# Patient Record
Sex: FEMALE | Race: WHITE | Employment: PART TIME | URBAN - METROPOLITAN AREA
[De-identification: names, ages, dates, MRNs, and addresses within clinical notes are randomized per-mention and may not be internally consistent; named-entity substitution may affect disease eponyms.]

---

## 2017-01-03 DIAGNOSIS — F41.9 ANXIETY: Primary | ICD-10-CM

## 2017-01-03 DIAGNOSIS — G47.00 INSOMNIA, UNSPECIFIED: ICD-10-CM

## 2017-01-03 NOTE — TELEPHONE ENCOUNTER
Last Written Prescription Date: zoloft=6/8/16, amitrip=5/25/16  Last Fill Quantity: zoloft=135, amitrip=90 # refills: 1, amitrip=0  Last Office Visit with [facility] primary care provider:  6/8/16        Last PHQ-9 score on record=   PHQ-9 SCORE 11/11/2016   Total Score -   Total Score MyChart -   Total Score 0     Leonora Pavon, Pharmacy [facility]

## 2017-01-04 RX ORDER — SERTRALINE HYDROCHLORIDE 100 MG/1
150 TABLET, FILM COATED ORAL DAILY
Qty: 135 TABLET | Refills: 0 | Status: SHIPPED | OUTPATIENT
Start: 2017-01-04 | End: 2017-12-05

## 2017-01-04 NOTE — TELEPHONE ENCOUNTER
Prescription approved per [facility] Refill Protocol.  Mikayla Ratliff PharmD   [facility]  [email or web address].

## 2017-03-09 DIAGNOSIS — G47.00 INSOMNIA, UNSPECIFIED: ICD-10-CM

## 2017-03-09 NOTE — TELEPHONE ENCOUNTER
We called pt to schedule follow up. 3/21/17.  Prescription approved per [facility] Refill Protocol.  Override OK.  Rafita Barnes RN

## 2017-03-09 NOTE — TELEPHONE ENCOUNTER
Amitriptyline 25 mg tab       Last Written Prescription Date: 01/04/17  Last Fill Quantity: 90, # refills: 0  Last Office Visit with [facility], [facility] or [facility] prescribing provider: 06/08/16 Roxana Cuadra       BP Readings from Last 3 Encounters:   10/22/16 142/80   06/08/16 125/80   05/31/16 127/80

## 2017-03-21 ENCOUNTER — OFFICE VISIT (OUTPATIENT)
Dept: FAMILY MEDICINE | Facility: CLINIC | Age: 57
End: 2017-03-21
Payer: COMMERCIAL

## 2017-03-21 VITALS
BODY MASS INDEX: 33.38 KG/M2 | DIASTOLIC BLOOD PRESSURE: 81 MMHG | TEMPERATURE: 99.4 F | RESPIRATION RATE: 16 BRPM | HEIGHT: 62 IN | HEART RATE: 97 BPM | SYSTOLIC BLOOD PRESSURE: 135 MMHG | OXYGEN SATURATION: 95 % | WEIGHT: 181.4 LBS

## 2017-03-21 DIAGNOSIS — F41.8 SITUATIONAL ANXIETY: Primary | ICD-10-CM

## 2017-03-21 DIAGNOSIS — N32.81 OVERACTIVE BLADDER: ICD-10-CM

## 2017-03-21 DIAGNOSIS — G47.00 INSOMNIA, UNSPECIFIED: ICD-10-CM

## 2017-03-21 DIAGNOSIS — E78.5 HYPERLIPIDEMIA LDL GOAL <160: ICD-10-CM

## 2017-03-21 PROCEDURE — 99214 OFFICE O/P EST MOD 30 MIN: CPT | Performed by: PHYSICIAN ASSISTANT

## 2017-03-21 RX ORDER — SOLIFENACIN SUCCINATE 5 MG/1
5 TABLET, FILM COATED ORAL DAILY
Qty: 90 TABLET | Refills: 1 | Status: SHIPPED | OUTPATIENT
Start: 2017-03-21 | End: 2017-12-05

## 2017-03-21 RX ORDER — HYDROXYZINE HYDROCHLORIDE 25 MG/1
25-50 TABLET, FILM COATED ORAL EVERY 6 HOURS PRN
Qty: 90 TABLET | Refills: 1 | Status: SHIPPED | OUTPATIENT
Start: 2017-03-21 | End: 2017-12-05

## 2017-03-21 RX ORDER — SERTRALINE HYDROCHLORIDE 100 MG/1
200 TABLET, FILM COATED ORAL DAILY
Qty: 180 TABLET | Refills: 1 | Status: SHIPPED | OUTPATIENT
Start: 2017-03-21 | End: 2018-01-10 | Stop reason: DRUGHIGH

## 2017-03-21 ASSESSMENT — ANXIETY QUESTIONNAIRES
GAD7 TOTAL SCORE: 18
3. WORRYING TOO MUCH ABOUT DIFFERENT THINGS: NEARLY EVERY DAY
IF YOU CHECKED OFF ANY PROBLEMS ON THIS QUESTIONNAIRE, HOW DIFFICULT HAVE THESE PROBLEMS MADE IT FOR YOU TO DO YOUR WORK, TAKE CARE OF THINGS AT HOME, OR GET ALONG WITH OTHER PEOPLE: VERY DIFFICULT
1. FEELING NERVOUS, ANXIOUS, OR ON EDGE: MORE THAN HALF THE DAYS
2. NOT BEING ABLE TO STOP OR CONTROL WORRYING: NEARLY EVERY DAY
5. BEING SO RESTLESS THAT IT IS HARD TO SIT STILL: MORE THAN HALF THE DAYS
7. FEELING AFRAID AS IF SOMETHING AWFUL MIGHT HAPPEN: NEARLY EVERY DAY
6. BECOMING EASILY ANNOYED OR IRRITABLE: MORE THAN HALF THE DAYS

## 2017-03-21 ASSESSMENT — PATIENT HEALTH QUESTIONNAIRE - PHQ9: 5. POOR APPETITE OR OVEREATING: NEARLY EVERY DAY

## 2017-03-21 NOTE — MR AVS SNAPSHOT
After Visit Summary   3/21/2017    Delilah Miranda    MRN: [id number]           Patient Information     Date Of Birth          1960        Visit Information        Provider Department      3/21/2017 7:30 AM Roxana Cuadra PA-C [facility]        Today's Diagnoses     Situational anxiety        Insomnia, unspecified        Overactive bladder          Care Instructions    [facility] for certification MM    Pap Smear due this year.         Follow-ups after your visit        Who to contact     If you have questions or need follow up information about today's clinic visit or your schedule please contact [facility] directly at [phone].  Normal or non-critical lab and imaging results will be communicated to you by MyChart, letter or phone within 4 business days after the clinic has received the results. If you do not hear from us within 7 days, please contact the clinic through appweevrhart or phone. If you have a critical or abnormal lab result, we will notify you by phone as soon as possible.  Submit refill requests through [employer] or call your pharmacy and they will forward the refill request to us. Please allow 3 business days for your refill to be completed.          Additional Information About Your Visit        MyChart Information     [employer] gives you secure access to your electronic health record. If you see a primary care provider, you can also send messages to your care team and make appointments. If you have questions, please call your primary care clinic.  If you do not have a primary care provider, please call [phone] and they will assist you.        Care EveryWhere ID     This is your Care EveryWhere ID. This could be used by other organizations to access your [facility] medical records  [id number]        Your Vitals Were     Pulse Temperature Respirations Height Last Period Pulse Oximetry    97 99.4  F (37.4  C) (Oral) 16 5'  "2\" (1.575 m) 04/25/2007 95%    Breastfeeding? BMI (Body Mass Index)                No 33.18 kg/m2           Blood Pressure from Last 3 Encounters:   03/21/17 135/81   10/22/16 142/80   06/08/16 125/80    Weight from Last 3 Encounters:   03/21/17 181 lb 6.4 oz (82.3 kg)   10/22/16 180 lb (81.6 kg)   06/08/16 181 lb (82.1 kg)              Today, you had the following     No orders found for display         Today's Medication Changes          These changes are accurate as of: 3/21/17  7:54 AM.  If you have any questions, ask your nurse or doctor.               These medicines have changed or have updated prescriptions.        Dose/Directions    amitriptyline 25 MG tablet   Commonly known as:  ELAVIL   This may have changed:  See the new instructions.   Used for:  Insomnia, unspecified   Changed by:  Roxana Cuadra PA-C        TAKE ONE TABLET BY MOUTH AT BEDTIME   Quantity:  90 tablet   Refills:  0       * sertraline 100 MG tablet   Commonly known as:  ZOLOFT   This may have changed:  Another medication with the same name was added. Make sure you understand how and when to take each.   Used for:  Anxiety   Changed by:  Roxana Cuadra PA-C        Dose:  150 mg   Take 1.5 tablets (150 mg) by mouth daily   Quantity:  135 tablet   Refills:  0       * sertraline 100 MG tablet   Commonly known as:  ZOLOFT   This may have changed:  You were already taking a medication with the same name, and this prescription was added. Make sure you understand how and when to take each.   Used for:  Situational anxiety   Changed by:  Roxana Cuadra PA-C        Dose:  200 mg   Take 2 tablets (200 mg) by mouth daily   Quantity:  180 tablet   Refills:  1       * Notice:  This list has 2 medication(s) that are the same as other medications prescribed for you. Read the directions carefully, and ask your doctor or other care provider to review them with you.      Stop taking these medicines if you haven't already. " Please contact your care team if you have questions.     azithromycin 250 MG tablet   Commonly known as:  ZITHROMAX   Stopped by:  Roxana Cuadra PA-C           co-enzyme Q-10 100 MG Caps capsule   Stopped by:  Roxana Cuadra PA-C           pravastatin 40 MG tablet   Commonly known as:  PRAVACHOL   Stopped by:  Roxana Cuadra PA-C           triamcinolone 0.1 % cream   Commonly known as:  KENALOG   Stopped by:  Roxana Cuadra PA-C                Where to get your medicines      These medications were sent to [address] [address]     Phone:  [phone]     amitriptyline 25 MG tablet    hydrOXYzine 25 MG tablet    sertraline 100 MG tablet    solifenacin 5 MG tablet                Primary Care Provider Office Phone # Fax #    Roxana Cuadra PA-C [phone]       [address] [address]        Thank you!     Thank you for choosing [facility]  for your care. Our goal is always to provide you with excellent care. Hearing back from our patients is one way we can continue to improve our services. Please take a few minutes to complete the written survey that you may receive in the mail after your visit with us. Thank you!             Your Updated Medication List - Protect others around you: Learn how to safely use, store and throw away your medicines at www.disposemymeds.org.          This list is accurate as of: 3/21/17  7:54 AM.  Always use your most recent med list.                   Brand Name Dispense Instructions for use    albuterol 108 (90 BASE) MCG/ACT Inhaler    albuterol    2 Inhaler    Inhale 2 puffs into the lungs every 6 hours as needed INHALE 1 TO 2 PUFFS EVERY 4 TO 6 HOURS AS NEEDED       amitriptyline 25 MG tablet    ELAVIL    90 tablet    TAKE ONE TABLET BY MOUTH AT BEDTIME       aspirin 81 MG tablet       Take 1 tablet by mouth daily.       atorvastatin 40 MG tablet    LIPITOR    90 tablet    Take 1 tablet (40 mg) by mouth daily       fluticasone 50 MCG/ACT spray    FLONASE    48 g    Spray 1-2 sprays into both nostrils daily       HM VITAMIN D3 4000 UNITS Caps   Generic drug:  Cholecalciferol      Take  by mouth.       hydrOXYzine 25 MG tablet    ATARAX    90 tablet    Take 1-2 tablets (25-50 mg) by mouth every 6 hours as needed for anxiety       methocarbamol 750 MG tablet    ROBAXIN    90 tablet    Take 1 tablet (750 mg) by mouth every 4 hours       ondansetron 8 MG tablet    ZOFRAN    18 tablet    Take 1 tablet (8 mg) by mouth every 8 hours as needed for nausea       * sertraline 100 MG tablet    ZOLOFT    135 tablet    Take 1.5 tablets (150 mg) by mouth daily       * sertraline 100 MG tablet    ZOLOFT    180 tablet    Take 2 tablets (200 mg) by mouth daily       solifenacin 5 MG tablet    VESICARE    90 tablet    Take 1 tablet (5 mg) by mouth daily       * Notice:  This list has 2 medication(s) that are the same as other medications prescribed for you. Read the directions carefully, and ask your doctor or other care provider to review them with you.

## 2017-03-21 NOTE — PROGRESS NOTES
SUBJECTIVE:                                                    Delilah Miranda is a 56 year old female who presents to clinic today for the following health issues:      Hyperlipidemia Follow-Up      Rate your low fat/cholesterol diet?: not monitoring fat    Taking statin?  Yes, possible muscle aches from statin    Other lipid medications/supplements?:  none     Anxiety Follow-Up    Status since last visit: Worsened     Other associated symptoms:None    Complicating factors:   Significant life event: Yes-     Current substance abuse: Cannabis, using for PTSD, looking into medical certification for this  Depression symptoms: Yes-    ELENITA-7 SCORE 5/31/2016 6/8/2016 11/11/2016   Total Score - - -   Total Score 21 14 2        GAD7           Amount of exercise or physical activity: 6-7 days/week for an average of 15-30 minutes    Problems taking medications regularly: No    Medication side effects: none    Diet: regular (no restrictions)      Medication Followup of Vesicare    Taking Medication as prescribed: yes    Side Effects:  None    Medication Helping Symptoms:  yes       Problem list and histories reviewed & adjusted, as indicated.  Additional history: as documented    Patient Active Problem List   Diagnosis     Allergic rhinitis     Thoracic or lumbosacral neuritis or radiculitis, unspecified     Mild intermittent asthma     Mild major depression (H)     OA (osteoarthritis) of knee     Hyperlipidemia LDL goal <160     Elevated fasting glucose     Vitamin D deficiency     Heartburn     Snoring     Urinary incontinence, nocturnal enuresis     Anxiety     Overactive bladder     Situational anxiety     Past Surgical History   Procedure Laterality Date     C nonspecific procedure       meniscus       Social History   Substance Use Topics     Smoking status: Former Smoker     Smokeless tobacco: Never Used      Comment: smoked 1 pk qd in her twenties quit at age 26     Alcohol use 0.0 oz/week     0 Standard drinks or  "equivalent per week      Comment: social-rarely     Family History   Problem Relation Age of Onset     CANCER Maternal Grandmother      CANCER Paternal Grandmother      CANCER Paternal Grandfather      HEART DISEASE Father      Hypertension Father      DIABETES Father      DIABETES Sister      DIABETES Maternal Grandfather            Reviewed and updated as needed this visit by clinical staff       Reviewed and updated as needed this visit by Provider         ROS:  Constitutional, HEENT, cardiovascular, pulmonary, GI, , musculoskeletal, neuro, skin, endocrine and psych systems are negative, except as otherwise noted.    OBJECTIVE:                                                    /81 (BP Location: Right arm, Patient Position: Chair, Cuff Size: Adult Large)  Pulse 97  Temp 99.4  F (37.4  C) (Oral)  Resp 16  Ht 5' 2\" (1.575 m)  Wt 181 lb 6.4 oz (82.3 kg)  LMP 04/25/2007  SpO2 95%  Breastfeeding? No  BMI 33.18 kg/m2  Body mass index is 33.18 kg/(m^2).  GENERAL APPEARANCE: healthy, alert and no distress  RESP: lungs clear to auscultation - no rales, rhonchi or wheezes  CV: regular rates and rhythm, normal S1 S2, no S3 or S4 and no murmur, click or rub  SKIN: no suspicious lesions or rashes  PSYCH: mentation appears normal and anxious         ASSESSMENT/PLAN:                                                            1. Situational anxiety  Will increase to 200 mg  Restart your EMDR therapy  Recheck in 3-6 months.   - sertraline (ZOLOFT) 100 MG tablet; Take 2 tablets (200 mg) by mouth daily  Dispense: 180 tablet; Refill: 1  - hydrOXYzine (ATARAX) 25 MG tablet; Take 1-2 tablets (25-50 mg) by mouth every 6 hours as needed for anxiety  Dispense: 90 tablet; Refill: 1    2. Insomnia, unspecified  Stable.   - amitriptyline (ELAVIL) 25 MG tablet; TAKE ONE TABLET BY MOUTH AT BEDTIME  Dispense: 90 tablet; Refill: 0    3. Overactive bladder  Stable.   - solifenacin (VESICARE) 5 MG tablet; Take 1 tablet (5 mg) by mouth " daily  Dispense: 90 tablet; Refill: 1    4. Hyperlipidemia LDL goal <160  Using meds currently no SE          Roxana Cuadra PA-C, PAMaria IsabelC  [facility]

## 2017-03-21 NOTE — NURSING NOTE
"Chief Complaint   Patient presents with     Recheck Medication       Initial /81 (BP Location: Right arm, Patient Position: Chair, Cuff Size: Adult Large)  Pulse 97  Temp 99.4  F (37.4  C) (Oral)  Resp 16  Ht 5' 2\" (1.575 m)  Wt 181 lb 6.4 oz (82.3 kg)  LMP 04/25/2007  SpO2 95%  Breastfeeding? No  BMI 33.18 kg/m2 Estimated body mass index is 33.18 kg/(m^2) as calculated from the following:    Height as of this encounter: 5' 2\" (1.575 m).    Weight as of this encounter: 181 lb 6.4 oz (82.3 kg).  Medication Reconciliation: complete Bettie Schmid MA  Health Maintenance has been reviewed.       "

## 2017-03-22 ASSESSMENT — PATIENT HEALTH QUESTIONNAIRE - PHQ9: SUM OF ALL RESPONSES TO PHQ QUESTIONS 1-9: 15

## 2017-03-22 ASSESSMENT — ANXIETY QUESTIONNAIRES: GAD7 TOTAL SCORE: 18

## 2017-05-16 ENCOUNTER — TELEPHONE (OUTPATIENT)
Dept: FAMILY MEDICINE | Facility: CLINIC | Age: 57
End: 2017-05-16

## 2017-05-16 NOTE — TELEPHONE ENCOUNTER
Panel Management Review      Patient has the following on her problem list:     Asthma review     ACT Total Scores 11/11/2016   ACT TOTAL SCORE (Goal Greater than or Equal to 20) 21   In the past 12 months, how many times did you visit the emergency room for your asthma without being admitted to the hospital? 0   In the past 12 months, how many times were you hospitalized overnight because of your asthma? 0      1. Is Asthma diagnosis on the Problem List? Yes    2. Is Asthma listed on Health Maintenance? Yes    3. Patient is due for:  ACT      Composite cancer screening  Chart review shows that this patient is due/due soon for the following Pap Smear  Summary:    Patient is due/failing the following:   ACT, PAP and PHYSICAL    Action needed:   Patient needs office visit for physical with pap. and Patient needs to do ACT.    Type of outreach:    Phone, left message for patient to call back.     Questions for provider review:    None                                                                                                                                    Bettie Schmid MA      Chart routed to Care Team .

## 2017-05-16 NOTE — LETTER
[facility]  [address] [id number] [phone]  May 23, 2017    Delilah Miranda  [address] [address] [address]    Dear Delilah,    I care about your health and have reviewed your health plan. I have reviewed your medical conditions, medication list, and lab results and am making recommendations based on this review, to better manage your health.    You are in particular need of attention regarding:  -Asthma  -Cervical Cancer Screening  -Wellness (Physical) Visit     I am recommending that you:  {recommendations:-schedule a WELLNESS (Physical) APPOINTMENT with me.   I will check fasting labs the same day - nothing to eat except water and meds for 8-10 hours prior.  -schedule a PAP SMEAR EXAM which is due.  Please disregard this reminder if you have had this exam elsewhere within the last year.  It would be helpful for us to have a copy of your recent pap smear report in our file so that we can best coordinate your care.   -Complete and return the attached ASTHMA CONTROL TEST.  If your total score is 19 or less or you have been to the ER or urgent care for your asthma, then please schedule an asthma followup appointment.    Here is a list of Health Maintenance topics that are due now or due soon:  Health Maintenance Due   Topic Date Due     HEPATITIS C SCREENING  07/11/1976     TETANUS IMMUNIZATION (SYSTEM ASSIGNED)  05/07/2017     PAP Q3 YR  04/16/2017     ASTHMA CONTROL TEST Q6 MOS (NO INBASKET)  05/10/2017     ASTHMA ACTION PLAN Q1 YR (NO INBASKET)  06/08/2017     DEPRESSION ACTION PLAN Q1 YR (NO INBASKET)  06/08/2017       Please call us at [phone] (or use [email or web address]) to address the above recommendations.     Thank you for trusting [facility] and we appreciate the opportunity to serve you.  We look forward to supporting your healthcare needs in the future.    Healthy Regards,    Roxana Cuadra PA-C

## 2017-06-24 ENCOUNTER — HEALTH MAINTENANCE LETTER (OUTPATIENT)
Age: 57
End: 2017-06-24

## 2017-09-22 ENCOUNTER — TRANSFERRED RECORDS (OUTPATIENT)
Dept: HEALTH INFORMATION MANAGEMENT | Facility: CLINIC | Age: 57
End: 2017-09-22

## 2017-09-28 ENCOUNTER — THERAPY VISIT (OUTPATIENT)
Dept: PHYSICAL THERAPY | Facility: CLINIC | Age: 57
End: 2017-09-28
Payer: COMMERCIAL

## 2017-09-28 DIAGNOSIS — M54.41 CHRONIC BILATERAL LOW BACK PAIN WITH RIGHT-SIDED SCIATICA: Primary | ICD-10-CM

## 2017-09-28 DIAGNOSIS — G89.29 CHRONIC BILATERAL LOW BACK PAIN WITH RIGHT-SIDED SCIATICA: Primary | ICD-10-CM

## 2017-09-28 DIAGNOSIS — M53.3 SI (SACROILIAC) JOINT DYSFUNCTION: ICD-10-CM

## 2017-09-28 PROCEDURE — 97530 THERAPEUTIC ACTIVITIES: CPT | Mod: GP | Performed by: PHYSICAL THERAPIST

## 2017-09-28 PROCEDURE — 97140 MANUAL THERAPY 1/> REGIONS: CPT | Mod: GP | Performed by: PHYSICAL THERAPIST

## 2017-09-28 PROCEDURE — 97161 PT EVAL LOW COMPLEX 20 MIN: CPT | Mod: GP | Performed by: PHYSICAL THERAPIST

## 2017-09-28 NOTE — LETTER
[facility] PHYSICAL THERAPY  [address]  [facility] [address]  [phone]    2017    Re: Delilah Miranda   :   1960  MRN:  [id number]   REFERRING PHYSICIAN:   Geraldo Fitzpatrick    [facility] PHYSICAL THERAPY  Date of Initial Evaluation:  17  Visits:  Rxs Used: 1  Reason for Referral:  Chronic bilateral low back pain with right-sided sciatica  SI (sacroiliac) joint dysfunction    EVALUATION SUMMARY    [facility] Initial Evaluation    Subjective:    Patient is a 57 year old female presenting with rehab back hpi. The history is provided by the patient.   Delilah Miranda is a 57 year old female with a lumbar condition.  Condition occurred with:  Insidious onset.  Condition occurred: for unknown reasons.  This is a recurrent condition  Patient reports a long history of recurrent LBP and had PT and an epidural 16 years ago. She started having some issues in 2017 for no apparent reason with an exacerbation of pain in early September. Pain recently has spread from the lower back into the right greater than left hip. On 17 she had a cortisone injection into her right hip with 50% pain relief. Chief complaints are of intermittent bilateral low back and bilateral hip pain with radiation into the right groin and anterior thigh. Pain is now worse with walking than sitting, which is the opposite of her previous symptoms. She also reports some weakness of the right leg and feeling of giving way, although it hasn't.  Patient reports pain:  Lumbar spine left and lumbar spine right.  Radiates to:  Gluteals right and thigh right (bilateral hips).  Pain is described as shooting and aching and is intermittent and reported as 6/10.  Associated symptoms:  Loss of motion/stiffness and loss of strength. Pain is worse during the day.  Symptoms are exacerbated by lifting, sitting, walking and  standing and relieved by rest, ice, analgesics and other.  Since onset symptoms are rapidly improving.  Special tests:  MRI (1 year ago, mild lumbar stenosis, right anterior pelvic tilt).  Previous treatment includes other (right hip cortisone injection).    General health as reported by patient is good.  Pertinent medical history includes:  Osteoarthritis, overweight, depression, asthma and sleep disorder/apnea.  Medical allergies: no.  Other surgeries include:  No.  Current medications:  Muscle relaxants and anti-depressants.  Current occupation is RN.  Patient is working in normal job without restrictions.  Primary job tasks include:  Prolonged standing, lifting and prolonged sitting.           Objective:    Standing Alignment:    Lumbar:  Lordosis decr  Gait:    Gait Type:  Antalgic   Assistive Devices:  None  Deviations:  Hip:  Trendelenberg L and Trendelenberg RGeneral Deviations:  Stride length decr and frank decr  Re: Delilah Miranda   :   1960      Lumbar/SI Evaluation  ROM:    AROM Lumbar:   Flexion:          100% no change in pain  Ext:                    40% LBP and stiffness   Side Bend:        Left:     Right:   Rotation:           Left:     Right:   Side Glide:        Left:  80% with right hip pain    Right:  80% with left hip pain  Strength: fair lower abdominal control, bilateral glut medius grade 4-/5, grade 4/5   Lumbar Myotomes:  normal  Lumbar DTR's:  not assessed  Cord Signs:  normal  Lumbar Dermtomes:  normal  Neural Tension/Mobility:  Lumbar:  Normal    Lumbar Palpation:    Tenderness present at Left:    PSIS and Greater Trochanter  Tenderness present at Right: PSIS and Greater Trochanter  SI joint/Sacrum:    R anterior/L posterior innominate rotation without pubic symphsis involvement. 90% correction achieved with MET. Post MET glut medius and max strength increased to grade 5-/5 bilaterally and trunk extension increased to 70%    Assessment/Plan:      Patient is a 57 year old  female with lumbar complaints.    Patient has the following significant findings with corresponding treatment plan.                Diagnosis 1: LB and hip pain with mild lumbar stenosis and R anterior pelvic tilt    Pain -  manual therapy and education  Decreased ROM/flexibility - manual therapy and therapeutic exercise  Decreased strength - therapeutic exercise and therapeutic activities  Impaired gait - gait training  Impaired muscle performance - neuro re-education  Decreased function - therapeutic activities  Impaired posture - neuro re-education  Pelvic rotation    Therapy Evaluation Codes:   1) History comprised of:   Personal factors that impact the plan of care:      None.    Comorbidity factors that impact the plan of care are:      Asthma, Depression, Osteoarthritis, Overweight, Pain at night/rest and Sleep disorder/apnea.     Medications impacting care: Anti-depressant and Pain.  2) Examination of Body Systems comprised of:   Body structures and functions that impact the plan of care:      Lumbar spine and Sacral illiac joint.   Activity limitations that impact the plan of care are:      Driving, Lifting, Sitting, Standing, Walking and Sleeping.  3) Clinical presentation characteristics are:   Stable/Uncomplicated.  4) Decision-Making    Low complexity using standardized patient assessment instrument and/or measureable assessment of functional outcome.  Cumulative Therapy Evaluation is: Low complexity.  Re: Delilah Miranda   :   1960          Previous and current functional limitations:  (See Goal Flow Sheet for this information)    Short term and Long term goals: (See Goal Flow Sheet for this information)   Communication ability:  Patient appears to be able to clearly communicate and understand verbal and written communication and follow directions correctly.  Treatment Explanation - The following has been discussed with the patient:   RX ordered/plan of care  Anticipated outcomes  Possible risks  and side effects  This patient would benefit from PT intervention to resume normal activities.   Rehab potential is good.    Frequency:  1-2 X week, once daily  Duration:  for 4-6 weeks  Discharge Plan:  Achieve all LTG.  Independent in home treatment program.  Reach maximal therapeutic benefit.    Thank you for your referral.    INQUIRIES  Therapist: Ileana Dimas, PT  [facility]  [id number] [place] [address]  Phone: [phone]  Fax: [phone]

## 2017-09-28 NOTE — PROGRESS NOTES
[facility] Initial Evaluation    Subjective:    Patient is a 57 year old female presenting with rehab back hpi. The history is provided by the patient.   Delilah Miranda is a 57 year old female with a lumbar condition.  Condition occurred with:  Insidious onset.  Condition occurred: for unknown reasons.  This is a recurrent condition  Patient reports a long history of recurrent LBP and had PT and an epidural 16 years ago. She started having some issues in March 2017 for no apparent reason with an exacerbation of pain in early September. Pain recently has spread from the lower back into the right greater than left hip. On 9/22/17 she had a cortisone injection into her right hip with 50% pain relief. Chief complaints are of intermittent bilateral low back and bilateral hip pain with radiation into the right groin and anterior thigh. Pain is now worse with walking than sitting, which is the opposite of her previous symptoms. She also reports some weakness of the right leg and feeling of giving way, although it hasn't. .    Patient reports pain:  Lumbar spine left and lumbar spine right.  Radiates to:  Gluteals right and thigh right (bilateral hips).  Pain is described as shooting and aching and is intermittent and reported as 6/10.  Associated symptoms:  Loss of motion/stiffness and loss of strength. Pain is worse during the day.  Symptoms are exacerbated by lifting, sitting, walking and standing and relieved by rest, ice, analgesics and other.  Since onset symptoms are rapidly improving.  Special tests:  MRI (1 year ago, mild lumbar stenosis, right anterior pelvic tilt).  Previous treatment includes other (right hip cortisone injection).    General health as reported by patient is good.  Pertinent medical history includes:  Osteoarthritis, overweight, depression, asthma and sleep disorder/apnea.  Medical allergies: no.  Other surgeries include:  No.  Current medications:  Muscle relaxants and  anti-depressants.  Current occupation is RN.  Patient is working in normal job without restrictions.  Primary job tasks include:  Prolonged standing, lifting and prolonged sitting.                                Objective:    Standing Alignment:        Lumbar:  Lordosis decr            Gait:    Gait Type:  Antalgic   Assistive Devices:  None  Deviations:  Hip:  Trendelenberg L and Trendelenberg RGeneral Deviations:  Stride length decr and frank decr               Lumbar/SI Evaluation  ROM:    AROM Lumbar:   Flexion:          100% no change in pain  Ext:                    40% LBP and stiffness   Side Bend:        Left:     Right:   Rotation:           Left:     Right:   Side Glide:        Left:  80% with right hip pain    Right:  80% with left hip pain        Strength: fair lower abdominal control, bilateral glut medius grade 4-/5, grade 4/5   Lumbar Myotomes:  normal            Lumbar DTR's:  not assessed      Cord Signs:  normal    Lumbar Dermtomes:  normal                Neural Tension/Mobility:  Lumbar:  Normal        Lumbar Palpation:    Tenderness present at Left:    PSIS and Greater Trochanter  Tenderness present at Right: PSIS and Greater Trochanter        SI joint/Sacrum:    R anterior/L posterior innominate rotation without pubic symphsis involvement. 90% correction achieved with MET. Post MET glut medius and max strength increased to grade 5-/5 bilaterally and trunk extension increased to 70%                                                       General     ROS    Assessment/Plan:      Patient is a 57 year old female with lumbar complaints.    Patient has the following significant findings with corresponding treatment plan.                Diagnosis 1: LB and hip pain with mild lumbar stenosis and R anterior pelvic tilt    Pain -  manual therapy and education  Decreased ROM/flexibility - manual therapy and therapeutic exercise  Decreased strength - therapeutic exercise and therapeutic  activities  Impaired gait - gait training  Impaired muscle performance - neuro re-education  Decreased function - therapeutic activities  Impaired posture - neuro re-education  Pelvic rotation    Therapy Evaluation Codes:   1) History comprised of:   Personal factors that impact the plan of care:      None.    Comorbidity factors that impact the plan of care are:      Asthma, Depression, Osteoarthritis, Overweight, Pain at night/rest and Sleep disorder/apnea.     Medications impacting care: Anti-depressant and Pain.  2) Examination of Body Systems comprised of:   Body structures and functions that impact the plan of care:      Lumbar spine and Sacral illiac joint.   Activity limitations that impact the plan of care are:      Driving, Lifting, Sitting, Standing, Walking and Sleeping.  3) Clinical presentation characteristics are:   Stable/Uncomplicated.  4) Decision-Making    Low complexity using standardized patient assessment instrument and/or measureable assessment of functional outcome.  Cumulative Therapy Evaluation is: Low complexity.    Previous and current functional limitations:  (See Goal Flow Sheet for this information)    Short term and Long term goals: (See Goal Flow Sheet for this information)     Communication ability:  Patient appears to be able to clearly communicate and understand verbal and written communication and follow directions correctly.  Treatment Explanation - The following has been discussed with the patient:   RX ordered/plan of care  Anticipated outcomes  Possible risks and side effects  This patient would benefit from PT intervention to resume normal activities.   Rehab potential is good.    Frequency:  1-2 X week, once daily  Duration:  for 4-6 weeks  Discharge Plan:  Achieve all LTG.  Independent in home treatment program.  Reach maximal therapeutic benefit.    Please refer to the daily flowsheet for treatment today, total treatment time and time spent performing 1:1 timed codes.

## 2017-09-28 NOTE — MR AVS SNAPSHOT
After Visit Summary   9/28/2017    Delilah Miranda    MRN: [id number]           Patient Information     Date Of Birth          1960        Visit Information        Provider Department      9/28/2017 8:10 AM Ileana Dimas PT [facility] Physical Therapy        Today's Diagnoses     Chronic bilateral low back pain with right-sided sciatica    -  1    SI (sacroiliac) joint dysfunction           Follow-ups after your visit        Your next 10 appointments already scheduled     Oct 04, 2017  9:30 AM CDT   LUZ Spine with Ileana Dimas PT   [facility] Therapy ([facility])    [address] [place] [address]  [facility] [address] [phone]              Who to contact     If you have questions or need follow up information about today's clinic visit or your schedule please contact [facility] PHYSICAL THERAPY directly at [phone].  Normal or non-critical lab and imaging results will be communicated to you by Guided Interventionshart, letter or phone within 4 business days after the clinic has received the results. If you do not hear from us within 7 days, please contact the clinic through [employer] Softwaret or phone. If you have a critical or abnormal lab result, we will notify you by phone as soon as possible.  Submit refill requests through [employer]pie or call your pharmacy and they will forward the refill request to us. Please allow 3 business days for your refill to be completed.          Additional Information About Your Visit        MyChart Information     [employer] gives you secure access to your electronic health record. If you see a primary care provider, you can also send messages to your care team and make appointments. If you have questions, please call your primary care clinic.  If you do not have a primary care provider, please call [phone] and they will assist you.        Care EveryWhere ID      This is your Care EveryWhere ID. This could be used by other organizations to access your [place] medical records  [id number]        Your Vitals Were     Last Period                   04/25/2007            Blood Pressure from Last 3 Encounters:   03/21/17 135/81   10/22/16 142/80   06/08/16 125/80    Weight from Last 3 Encounters:   03/21/17 82.3 kg (181 lb 6.4 oz)   10/22/16 81.6 kg (180 lb)   06/08/16 82.1 kg (181 lb)              We Performed the Following     HC PT EVAL, LOW COMPLEXITY     LUZ INITIAL EVAL REPORT     MANUAL THER TECH,1+REGIONS,EA 15 MIN     THERAPEUTIC ACTIVITIES        Primary Care Provider Office Phone # Fax #    Roxana Rhonda Cuadra PA-C [phone] [address] [facility] [address]        Equal Access to Services     JACKIE HANSEN : Hadii aad ku hadasho Soomaali, waaxda luqadaha, qaybta kaalmada adeegyada, mikala main haypatricia weber . So [facility] [phone].    ATENCIÓN: Si habla español, tiene a rodriguez disposición servicios gratuitos de asistencia lingüística. Llame al [phone].    We comply with applicable federal civil rights laws and [place] laws. We do not discriminate on the basis of race, color, national origin, age, disability sex, sexual orientation or gender identity.            Thank you!     Thank you for choosing [facility] PH[facility]ICAL THERAP[facility]  for your care. Our goal is always to provide you with excellent care. Hearing back from our patients is one way we can continue to improve our services. Please take a few minutes to complete the written survey that you may receive in the mail after your visit with us. Thank you!             Your Updated Medication List - Protect others around you: Learn how to safely use, store and throw away your medicines at www.disposemymeds.org.          This list is accurate as of: 9/28/17  5:32 PM.  Always use your most recent med list.                   Brand Name Dispense  Instructions for use Diagnosis    albuterol 108 (90 BASE) MCG/ACT Inhaler    PROAIR HFA    2 Inhaler    Inhale 2 puffs into the lungs every 6 hours as needed INHALE 1 TO 2 PUFFS EVERY 4 TO 6 HOURS AS NEEDED    Mild intermittent asthma       amitriptyline 25 MG tablet    ELAVIL    90 tablet    TAKE ONE TABLET BY MOUTH AT BEDTIME    Insomnia, unspecified       aspirin 81 MG tablet      Take 1 tablet by mouth daily.        atorvastatin 40 MG tablet    LIPITOR    90 tablet    Take 1 tablet (40 mg) by mouth daily    Hyperlipidemia LDL goal <130       fluticasone 50 MCG/ACT spray    FLONASE    48 g    Spray 1-2 sprays into both nostrils daily    Allergic rhinitis       HM VITAMIN D3 4000 UNITS Caps   Generic drug:  Cholecalciferol      Take  by mouth.        hydrOXYzine 25 MG tablet    ATARAX    90 tablet    Take 1-2 tablets (25-50 mg) by mouth every 6 hours as needed for anxiety    Situational anxiety       methocarbamol 750 MG tablet    ROBAXIN    90 tablet    Take 1 tablet (750 mg) by mouth every 4 hours    Thoracic or lumbosacral neuritis or radiculitis, unspecified       ondansetron 8 MG tablet    ZOFRAN    18 tablet    Take 1 tablet (8 mg) by mouth every 8 hours as needed for nausea    Nausea       * sertraline 100 MG tablet    ZOLOFT    135 tablet    Take 1.5 tablets (150 mg) by mouth daily    Anxiety       * sertraline 100 MG tablet    ZOLOFT    180 tablet    Take 2 tablets (200 mg) by mouth daily    Situational anxiety       solifenacin 5 MG tablet    VESICARE    90 tablet    Take 1 tablet (5 mg) by mouth daily    Overactive bladder       * Notice:  This list has 2 medication(s) that are the same as other medications prescribed for you. Read the directions carefully, and ask your doctor or other care provider to review them with you.

## 2017-10-04 ENCOUNTER — THERAPY VISIT (OUTPATIENT)
Dept: PHYSICAL THERAPY | Facility: CLINIC | Age: 57
End: 2017-10-04
Payer: COMMERCIAL

## 2017-10-04 DIAGNOSIS — G89.29 CHRONIC BILATERAL LOW BACK PAIN WITH RIGHT-SIDED SCIATICA: ICD-10-CM

## 2017-10-04 DIAGNOSIS — M53.3 SI (SACROILIAC) JOINT DYSFUNCTION: ICD-10-CM

## 2017-10-04 DIAGNOSIS — M54.41 CHRONIC BILATERAL LOW BACK PAIN WITH RIGHT-SIDED SCIATICA: ICD-10-CM

## 2017-10-04 PROCEDURE — 97110 THERAPEUTIC EXERCISES: CPT | Mod: GP | Performed by: PHYSICAL THERAPIST

## 2017-10-04 PROCEDURE — 97140 MANUAL THERAPY 1/> REGIONS: CPT | Mod: GP | Performed by: PHYSICAL THERAPIST

## 2017-10-04 NOTE — MR AVS SNAPSHOT
After Visit Summary   10/4/2017    Delilah Miranda    MRN: [id number]           Patient Information     Date Of Birth          1960        Visit Information        Provider Department      10/4/2017 9:30 AM Ileana Dimas, PT [facility] Physical Therapy        Today's Diagnoses     Chronic bilateral low back pain with right-sided sciatica        SI (sacroiliac) joint dysfunction           Follow-ups after your visit        Your next 10 appointments already scheduled     Oct 09, 2017 10:10 AM CDT   LUZ Spine with Ileana Dimas PT   [facility] Physical Therapy ([facility])    [address]  [place] [address]   [phone]            Oct 17, 2017 10:10 AM CDT   LUZ Spine with Ileana Dimas PT   [facility] Therapy ([facility])    [address]  [facility] [id number] [phone]              Who to contact     If you have questions or need follow up information about today's clinic visit or your schedule please contact [facility] PHYSICAL THERAPY directly at [phone].  Normal or non-critical lab and imaging results will be communicated to you by MyChart, letter or phone within 4 business days after the clinic has received the results. If you do not hear from us within 7 days, please contact the clinic through MyChart or phone. If you have a critical or abnormal lab result, we will notify you by phone as soon as possible.  Submit refill requests through [employer]k or call your pharmacy and they will forward the refill request to us. Please allow 3 business days for your refill to be completed.          Additional Information About Your Visit        Ginger.iohart Information     StandDesk gives you secure access to your electronic health record. If you see a primary care provider, you can also send messages to your  care team and make appointments. If you have questions, please call your primary care clinic.  If you do not have a primary care provider, please call [phone] and they will assist you.        Care EveryWhere ID     This is your Care EveryWhere ID. This could be used by other organizations to access your [place] medical records  [id number]        Your Vitals Were     Last Period                   04/25/2007            Blood Pressure from Last 3 Encounters:   03/21/17 135/81   10/22/16 142/80   06/08/16 125/80    Weight from Last 3 Encounters:   03/21/17 82.3 kg (181 lb 6.4 oz)   10/22/16 81.6 kg (180 lb)   06/08/16 82.1 kg (181 lb)              We Performed the Following     MANUAL THER TECH,1+REGIONS,EA 15 MIN     THERAPEUTIC EXERCISES        Primary Care Provider Office Phone # Fax #    Roxana Rhonda Cuadra PA-C [phone]       [address] [facility] [address]        Equal Access to Services     JACKIE HANSEN : Hadii aad ku hadasho Soomaali, waaxda luqadaha, qaybta kaalmada adeegyada, waxay idiin haymundon franc weber . So [facility] [phone].    ATENCIÓN: Si habla español, tiene a rodriguez disposición servicios gratuitos de asistencia lingüística. Ll[facility] [phone].    We comply with applicable federal civil rights laws and [place] laws. We do not discriminate on the basis of race, color, national origin, age, disability, sex, sexual orientation, or gender identity.            Thank you!     Thank you for choosing [facility] PHYSICAL THERAPY  for your care. Our goal is always to provide you with excellent care. Hearing back from our patients is one way we can continue to improve our services. Please take a few minutes to complete the written survey that you may receive in the mail after your visit with us. Thank you!             Your Updated Medication List - Protect others around you: Learn how to safely use, store and throw away your  medicines at www.disposemymeds.org.          This list is accurate as of: 10/4/17 10:05 AM.  Always use your most recent med list.                   Brand Name Dispense Instructions for use Diagnosis    albuterol 108 (90 BASE) MCG/ACT Inhaler    PROAIR HFA    2 Inhaler    Inhale 2 puffs into the lungs every 6 hours as needed INHALE 1 TO 2 PUFFS EVERY 4 TO 6 HOURS AS NEEDED    Mild intermittent asthma       amitriptyline 25 MG tablet    ELAVIL    90 tablet    TAKE ONE TABLET BY MOUTH AT BEDTIME    Insomnia, unspecified       aspirin 81 MG tablet      Take 1 tablet by mouth daily.        atorvastatin 40 MG tablet    LIPITOR    90 tablet    Take 1 tablet (40 mg) by mouth daily    Hyperlipidemia LDL goal <130       fluticasone 50 MCG/ACT spray    FLONASE    48 g    Spray 1-2 sprays into both nostrils daily    Allergic rhinitis       HM VITAMIN D3 4000 UNITS Caps   Generic drug:  Cholecalciferol      Take  by mouth.        hydrOXYzine 25 MG tablet    ATARAX    90 tablet    Take 1-2 tablets (25-50 mg) by mouth every 6 hours as needed for anxiety    Situational anxiety       methocarbamol 750 MG tablet    ROBAXIN    90 tablet    Take 1 tablet (750 mg) by mouth every 4 hours    Thoracic or lumbosacral neuritis or radiculitis, unspecified       ondansetron 8 MG tablet    ZOFRAN    18 tablet    Take 1 tablet (8 mg) by mouth every 8 hours as needed for nausea    Nausea       * sertraline 100 MG tablet    ZOLOFT    135 tablet    Take 1.5 tablets (150 mg) by mouth daily    Anxiety       * sertraline 100 MG tablet    ZOLOFT    180 tablet    Take 2 tablets (200 mg) by mouth daily    Situational anxiety       solifenacin 5 MG tablet    VESICARE    90 tablet    Take 1 tablet (5 mg) by mouth daily    Overactive bladder       * Notice:  This list has 2 medication(s) that are the same as other medications prescribed for you. Read the directions carefully, and ask your doctor or other care provider to review them with you.

## 2017-10-04 NOTE — PROGRESS NOTES
Subjective:    HPI                    Objective:    System    Physical Exam    General     ROS    Assessment/Plan:      SUBJECTIVE  Subjective changes as noted by pt:   Felt much better for several days after the first visit. 2-3 days ago she felt like her pelvis might have shifted back again.   Current pain level:  3/10   Changes in function:  Yes (See Goal flowsheet attached for changes in current functional level)     Adverse reaction to treatment or activity:  None    OBJECTIVE  Changes in objective findings:  Yes,  Trunk AROM flexion 100%, extension 60% with stiffness. Moderate R ant/L post innominate rotation present again today. 100% correction achieved with MET. Post MET pressup % and standing extension 100%. Fair sitting posture still crossing legs at times.      ASSESSMENT  Delilah continues to require intervention to meet STG and LTG's: PT  Patient's symptoms are resolving.  Patient is ready to progress to more complex exercises.  Response to therapy has shown an improvement in  pain level, ROM  and function  Progress made towards STG/LTG?  Yes (See Goal flowsheet attached for updates on achievement of STG and LTG)    PLAN  Current treatment program is being advanced to more complex exercises.    PTA/ATC plan:  N/A    Please refer to the daily flowsheet for treatment today, total treatment time and time spent performing 1:1 timed codes.

## 2017-10-06 ENCOUNTER — TELEPHONE (OUTPATIENT)
Dept: FAMILY MEDICINE | Facility: CLINIC | Age: 57
End: 2017-10-06

## 2017-10-06 NOTE — TELEPHONE ENCOUNTER
Panel Management Review      Patient has the following on her problem list:     Depression / Dysthymia review    Measure:  Needs PHQ-9 score of 4 or less during index window.  Administer PHQ-9 and if score is 5 or more, send encounter to provider for next steps.    5 - 7 month window range: 0    PHQ-9 SCORE 6/8/2016 11/11/2016 3/21/2017   Total Score - - -   Total Score MyChart - - -   Total Score 4 0 15       If PHQ-9 recheck is 5 or more, route to provider for next steps.    Patient is due for:  PHQ9        Composite cancer screening  Chart review shows that this patient is due/due soon for the following Pap Smear  Summary:    Patient is due/failing the following:   ACT, PAP, PHQ9 and PHYSICAL    Action needed:   Patient needs office visit for physical with pap., Patient needs to do ACT. and Patient needs to do PHQ9.    Type of outreach:    Phone, left message for patient to call back.     Questions for provider review:    None                                                                                                                                    Bettie Schmid MA     Chart routed to Care Team .

## 2017-10-06 NOTE — LETTER
[facility]  [address] [address] [address] [phone]  October 13, 2017    Delilah Miranda  [address] [address]    Dear Delilah,    I care about your health and have reviewed your health plan. I have reviewed your medical conditions, medication list, and lab results and am making recommendations based on this review, to better manage your health.    You are in particular need of attention regarding:  -Asthma  -Depression  -Cervical Cancer Screening  -Wellness (Physical) Visit     I am recommending that you:  {recommendations:-schedule a WELLNESS (Physical) APPOINTMENT with me.   I will check fasting labs the same day - nothing to eat except water and meds for 8-10 hours prior.  -schedule a PAP SMEAR EXAM which is due.  Please disregard this reminder if you have had this exam elsewhere within the last year.  It would be helpful for us to have a copy of your recent pap smear report in our file so that we can best coordinate your care.   -Complete and return the attached ASTHMA CONTROL TEST.  If your total score is 19 or less or you have been to the ER or urgent care for your asthma, then please schedule an asthma followup appointment.  Fill out PHQ-9 and send back in the envelope provided.      Here is a list of Health Maintenance topics that are due now or due soon:  Health Maintenance Due   Topic Date Due     HEPATITIS C SCREENING  07/11/1976     PAP Q3 YR  04/16/2017     TETANUS IMMUNIZATION (SYSTEM ASSIGNED)  05/07/2017     ASTHMA CONTROL TEST Q6 MOS  05/10/2017     ASTHMA ACTION PLAN Q1 YR  06/08/2017     DEPRESSION ACTION PLAN Q1 YR  06/08/2017     INFLUENZA VACCINE (SYSTEM ASSIGNED)  09/01/2017     PHQ-9 Q6 MONTHS  09/21/2017     LIPID MONITORING Q1 YEAR  10/27/2017       Please call us at [phone] (or use [employer]) to address the above recommendations.     Thank you for trusting [facility] and we appreciate the  opportunity to serve you.  We look forward to supporting your healthcare needs in the future.    Healthy Regards,    Roxana Cuadra PA-C

## 2017-10-09 ENCOUNTER — THERAPY VISIT (OUTPATIENT)
Dept: PHYSICAL THERAPY | Facility: CLINIC | Age: 57
End: 2017-10-09
Payer: COMMERCIAL

## 2017-10-09 DIAGNOSIS — G89.29 CHRONIC BILATERAL LOW BACK PAIN WITH RIGHT-SIDED SCIATICA: ICD-10-CM

## 2017-10-09 DIAGNOSIS — M54.41 CHRONIC BILATERAL LOW BACK PAIN WITH RIGHT-SIDED SCIATICA: ICD-10-CM

## 2017-10-09 DIAGNOSIS — M53.3 SI (SACROILIAC) JOINT DYSFUNCTION: ICD-10-CM

## 2017-10-09 PROCEDURE — 97140 MANUAL THERAPY 1/> REGIONS: CPT | Mod: GP | Performed by: PHYSICAL THERAPIST

## 2017-10-09 PROCEDURE — 97110 THERAPEUTIC EXERCISES: CPT | Mod: GP | Performed by: PHYSICAL THERAPIST

## 2017-10-17 ENCOUNTER — THERAPY VISIT (OUTPATIENT)
Dept: PHYSICAL THERAPY | Facility: CLINIC | Age: 57
End: 2017-10-17
Payer: COMMERCIAL

## 2017-10-17 DIAGNOSIS — G89.29 CHRONIC BILATERAL LOW BACK PAIN WITH RIGHT-SIDED SCIATICA: ICD-10-CM

## 2017-10-17 DIAGNOSIS — M53.3 SI (SACROILIAC) JOINT DYSFUNCTION: ICD-10-CM

## 2017-10-17 DIAGNOSIS — M54.41 CHRONIC BILATERAL LOW BACK PAIN WITH RIGHT-SIDED SCIATICA: ICD-10-CM

## 2017-10-17 PROCEDURE — 97112 NEUROMUSCULAR REEDUCATION: CPT | Mod: GP | Performed by: PHYSICAL THERAPIST

## 2017-10-17 PROCEDURE — 97110 THERAPEUTIC EXERCISES: CPT | Mod: GP | Performed by: PHYSICAL THERAPIST

## 2017-10-17 NOTE — PROGRESS NOTES
"Subjective:    HPI                    Objective:    System    Physical Exam    General     ROS    Assessment/Plan:      SUBJECTIVE  Subjective changes as noted by pt:   Doing better. Less painful and feeling stronger. No pain which interfers with sleeping and not needed ice much for pain.    Current pain level:  2/10   Changes in function:  Yes (See Goal flowsheet attached for changes in current functional level)     Adverse reaction to treatment or activity:  None    OBJECTIVE  Changes in objective findings:  Yes, Trunk AROM flexion 100%, extension 80%.  No pelvic rotation present today. L leg 1/2 inch shorter than right. Fair abdominal and glut max control. Glut medius grade 4+/5 B. SLS 30\" with ease on R, 23-26\" on L. Fair to good sitting posture with effort.      ASSESSMENT  Delilah continues to require intervention to meet STG and LTG's: PT  Patient's symptoms are resolving.  Patient is ready to progress to more complex exercises.  Response to therapy has shown an improvement in  pain level, ROM , strength, muscle control, posture and function  Progress made towards STG/LTG?  Yes (See Goal flowsheet attached for updates on achievement of STG and LTG)    PLAN  Current treatment program is being advanced to more complex exercises.    PTA/ATC plan:  N/A    Please refer to the daily flowsheet for treatment today, total treatment time and time spent performing 1:1 timed codes.              "

## 2017-10-17 NOTE — MR AVS SNAPSHOT
After Visit Summary   10/17/2017    Delilah Miranda    MRN: [id number]           Patient Information     Date Of Birth          1960        Visit Information        Provider Department      10/17/2017 10:10 AM Ileana Dimas, PT [facility] [place] [facility] Physical Therapy        Today's Diagnoses     Chronic bilateral low back pain with right-sided sciatica        SI (sacroiliac) joint dysfunction           Follow-ups after your visit        Your next 10 appointments already scheduled     Oct 23, 2017 10:50 AM CDT   LUZ Spine with Ileana Dimas PT   [facility] [place] [facility] Physical Therapy ([facility])    [address]  [place] [address]   [phone]            Oct 31, 2017 10:50 AM CDT   LUZ Spine with Ileana Dimas PT   [facility] [place] [facility] Therapy ([facility])    [address]  [place] [id number] [phone]              Who to contact     If you have questions or need follow up information about today's clinic visit or your schedule please contact [facility] PHYSICAL THERAPY directly at [phone].  Normal or non-critical lab and imaging results will be communicated to you by MyChart, letter or phone within 4 business days after the clinic has received the results. If you do not hear from us within 7 days, please contact the clinic through MyChart or phone. If you have a critical or abnormal lab result, we will notify you by phone as soon as possible.  Submit refill requests through [employer] or call your pharmacy and they will forward the refill request to us. Please allow 3 business days for your refill to be completed.          Additional Information About Your Visit        [employer] Information     [employer] gives you secure access to your electronic health record. If you see a primary care provider, you can also send messages to  your care team and make appointments. If you have questions, please call your primary care clinic.  If you do not have a primary care provider, please call [phone] and they will assist you.        Care EveryWhere ID     This is your Care EveryWhere ID. This could be used by other organizations to access your [place] medical records  [id number]        Your Vitals Were     Last Period                   04/25/2007            Blood Pressure from Last 3 Encounters:   03/21/17 135/81   10/22/16 142/80   06/08/16 125/80    Weight from Last 3 Encounters:   03/21/17 82.3 kg (181 lb 6.4 oz)   10/22/16 81.6 kg (180 lb)   06/08/16 82.1 kg (181 lb)              We Performed the Following     NEUROMUSCULAR RE-EDUCATION     THERAPEUTIC EXERCISES        Primary Care Provider Office Phone # Fax #    Roxana Rhonda Cuadra PA-C [phone]       [phone]  [phone]        Equal Access to Services     JACKIE HANSEN : Hadii aad ku hadasho Soomaali, waaxda luqadaha, qaybta kaalmada adeegyada, waxay idiin haymundon franc weber . So [facility] [phone].    ATENCIÓN: Si habla español, tiene a rodriguez disposición servicios gratuitos de asistencia lingüística. [facility] [phone].    We comply with applicable federal civil rights laws and [place] laws. We do not discriminate on the basis of race, color, national origin, age, disability, sex, sexual orientation, or gender identity.            Thank you!     Thank you for choosing [facility] PH[facility]ICAL THERAP[facility]  for your care. Our goal is always to provide you with excellent care. Hearing back from our patients is one way we can continue to improve our services. Please take a few minutes to complete the written survey that you may receive in the mail after your visit with us. Thank you!             Your Updated Medication List - Protect others around you: Learn how to safely use, store and throw away your medicines  at www.disposemymeds.org.          This list is accurate as of: 10/17/17 10:55 AM.  Always use your most recent med list.                   Brand Name Dispense Instructions for use Diagnosis    albuterol 108 (90 BASE) MCG/ACT Inhaler    PROAIR HFA    2 Inhaler    Inhale 2 puffs into the lungs every 6 hours as needed INHALE 1 TO 2 PUFFS EVERY 4 TO 6 HOURS AS NEEDED    Mild intermittent asthma       amitriptyline 25 MG tablet    ELAVIL    90 tablet    TAKE ONE TABLET BY MOUTH AT BEDTIME    Insomnia, unspecified       aspirin 81 MG tablet      Take 1 tablet by mouth daily.        atorvastatin 40 MG tablet    LIPITOR    90 tablet    Take 1 tablet (40 mg) by mouth daily    Hyperlipidemia LDL goal <130       fluticasone 50 MCG/ACT spray    FLONASE    48 g    Spray 1-2 sprays into both nostrils daily    Allergic rhinitis       HM VITAMIN D3 4000 UNITS Caps   Generic drug:  Cholecalciferol      Take  by mouth.        hydrOXYzine 25 MG tablet    ATARAX    90 tablet    Take 1-2 tablets (25-50 mg) by mouth every 6 hours as needed for anxiety    Situational anxiety       methocarbamol 750 MG tablet    ROBAXIN    90 tablet    Take 1 tablet (750 mg) by mouth every 4 hours    Thoracic or lumbosacral neuritis or radiculitis, unspecified       ondansetron 8 MG tablet    ZOFRAN    18 tablet    Take 1 tablet (8 mg) by mouth every 8 hours as needed for nausea    Nausea       * sertraline 100 MG tablet    ZOLOFT    135 tablet    Take 1.5 tablets (150 mg) by mouth daily    Anxiety       * sertraline 100 MG tablet    ZOLOFT    180 tablet    Take 2 tablets (200 mg) by mouth daily    Situational anxiety       solifenacin 5 MG tablet    VESICARE    90 tablet    Take 1 tablet (5 mg) by mouth daily    Overactive bladder       * Notice:  This list has 2 medication(s) that are the same as other medications prescribed for you. Read the directions carefully, and ask your doctor or other care provider to review them with you.

## 2017-10-24 ENCOUNTER — TELEPHONE (OUTPATIENT)
Dept: FAMILY MEDICINE | Facility: CLINIC | Age: 57
End: 2017-10-24

## 2017-10-24 DIAGNOSIS — F51.04 PSYCHOPHYSIOLOGICAL INSOMNIA: ICD-10-CM

## 2017-10-24 DIAGNOSIS — E78.5 HYPERLIPIDEMIA LDL GOAL <130: ICD-10-CM

## 2017-10-24 RX ORDER — ATORVASTATIN CALCIUM 40 MG/1
TABLET, FILM COATED ORAL
Qty: 30 TABLET | Refills: 0 | Status: SHIPPED | OUTPATIENT
Start: 2017-10-24 | End: 2017-12-05

## 2017-10-24 NOTE — TELEPHONE ENCOUNTER
Routing refill request to provider for review/approval because:  Drug interaction warning    Leonora Lawrence RN    LM for call back.   See message from 3/21/17 OV    1. Situational anxiety  Will increase to 200 mg  Restart your EMDR therapy  Recheck in 3-6 months.   - sertraline (ZOLOFT) 100 MG tablet; Take 2 tablets (200 mg) by mouth daily  Dispense: 180 tablet; Refill: 1  - hydrOXYzine (ATARAX) 25 MG tablet; Take 1-2 tablets (25-50 mg) by mouth every 6 hours as needed for anxiety  Dispense: 90 tablet; Refill: 1    Leonora Lawrence RN

## 2017-10-31 ENCOUNTER — THERAPY VISIT (OUTPATIENT)
Dept: PHYSICAL THERAPY | Facility: CLINIC | Age: 57
End: 2017-10-31
Payer: COMMERCIAL

## 2017-10-31 DIAGNOSIS — M53.3 SI (SACROILIAC) JOINT DYSFUNCTION: ICD-10-CM

## 2017-10-31 DIAGNOSIS — M54.41 CHRONIC BILATERAL LOW BACK PAIN WITH RIGHT-SIDED SCIATICA: ICD-10-CM

## 2017-10-31 DIAGNOSIS — G89.29 CHRONIC BILATERAL LOW BACK PAIN WITH RIGHT-SIDED SCIATICA: ICD-10-CM

## 2017-10-31 PROCEDURE — 97112 NEUROMUSCULAR REEDUCATION: CPT | Mod: GP | Performed by: PHYSICAL THERAPIST

## 2017-10-31 PROCEDURE — 97110 THERAPEUTIC EXERCISES: CPT | Mod: GP | Performed by: PHYSICAL THERAPIST

## 2017-11-14 ENCOUNTER — THERAPY VISIT (OUTPATIENT)
Dept: PHYSICAL THERAPY | Facility: CLINIC | Age: 57
End: 2017-11-14
Payer: COMMERCIAL

## 2017-11-14 DIAGNOSIS — M53.3 SI (SACROILIAC) JOINT DYSFUNCTION: ICD-10-CM

## 2017-11-14 DIAGNOSIS — G89.29 CHRONIC BILATERAL LOW BACK PAIN WITH RIGHT-SIDED SCIATICA: ICD-10-CM

## 2017-11-14 DIAGNOSIS — M54.41 CHRONIC BILATERAL LOW BACK PAIN WITH RIGHT-SIDED SCIATICA: ICD-10-CM

## 2017-11-14 PROCEDURE — 97140 MANUAL THERAPY 1/> REGIONS: CPT | Mod: GP | Performed by: PHYSICAL THERAPIST

## 2017-11-14 PROCEDURE — 97110 THERAPEUTIC EXERCISES: CPT | Mod: GP | Performed by: PHYSICAL THERAPIST

## 2017-11-14 PROCEDURE — 97112 NEUROMUSCULAR REEDUCATION: CPT | Mod: GP | Performed by: PHYSICAL THERAPIST

## 2017-11-14 NOTE — PROGRESS NOTES
Subjective:    HPI                    Objective:    System    Physical Exam    General     ROS    Assessment/Plan:      SUBJECTIVE  Subjective changes as noted by pt:   Using heel lift has caused more left buttock soreness more LB and hip pain and general the past 2 weeks.    Current pain level: 5/10   Changes in function:  Yes (See Goal flowsheet attached for changes in current functional level)     Adverse reaction to treatment or activity:  None    OBJECTIVE  Changes in objective findings:  Yes, Moderate R ant/Lpost innominnate rotation with good correction with MET. Core strength and ROM as on 10/31/17.      ASSESSMENT  Delilah continues to require intervention to meet STG and LTG's: PT  Patient has experienced an exacerbation of symptoms.  Response to therapy has shown an improvement in  ROM , strength and muscle control  Response to therapy has shown a worsening of  pain level and function  Progress made towards STG/LTG?  Yes (See Goal flowsheet attached for updates on achievement of STG and LTG)    PLAN  Continue current treatment plan until patient demonstrates readiness to progress to higher level exercises.    PTA/ATC plan:  N/A    Please refer to the daily flowsheet for treatment today, total treatment time and time spent performing 1:1 timed codes.

## 2017-11-14 NOTE — MR AVS SNAPSHOT
After Visit Summary   11/14/2017    Delilah Miranda    MRN: [id number]           Patient Information     Date Of Birth          1960        Visit Information        Provider Department      11/14/2017 11:30 AM Ileana Dimas PT [place] [facility] Physical Therapy        Today's Diagnoses     Chronic bilateral low back pain with right-sided sciatica        SI (sacroiliac) joint dysfunction           Follow-ups after your visit        Who to contact     If you have questions or need follow up information about today's clinic visit or your schedule please contact [place] [facility] PHYSICAL [facility] directly at [phone].  Normal or non-critical lab and imaging results will be communicated to you by MyChart, letter or phone within 4 business days after the clinic has received the results. If you do not hear from us within 7 days, please contact the clinic through [employer] or phone. If you have a critical or abnormal lab result, we will notify you by phone as soon as possible.  Submit refill requests through [employer] or call your pharmacy and they will forward the refill request to us. Please allow 3 business days for your refill to be completed.          Additional Information About Your Visit        MyChart Information     [employer] gives you secure access to your electronic health record. If you see a primary care provider, you can also send messages to your care team and make appointments. If you have questions, please call your primary care clinic.  If you do not have a primary care provider, please call [phone] and they will assist you.        Care EveryWhere ID     This is your Care EveryWhere ID. This could be used by other organizations to access your [place] medical records  [id number]        Your Vitals Were     Last Period                   04/25/2007            Blood Pressure from Last 3 Encounters:   03/21/17 135/81   10/22/16  142/80   06/08/16 125/80    Weight from Last 3 Encounters:   03/21/17 82.3 kg (181 lb 6.4 oz)   10/22/16 81.6 kg (180 lb)   06/08/16 82.1 kg (181 lb)              We Performed the Following     MANUAL THER TECH,1+REGIONS,EA 15 MIN     NEUROMUSCULAR RE-EDUCATION     THERAPEUTIC EXERCISES        Primary Care Provider Office Phone # Fax #    Roxana MARCIAL Cuadra PA-C [phone] [address] [facility] [address]        Equal Access to Services     [facility]: Hadii aad ku hadasho Soomaali, waaxda luqadaha, qaybta kaalmada adeegyada, waxay etelvina haypatricia weber . So [facility] [phone].    ATENCIÓN: Si habla español, tiene a rodriguez disposición servicios gratuitos de asistencia lingüística. [facility] [phone].    We comply with applicable federal civil rights laws and [place] laws. We do not discriminate on the basis of race, color, national origin, age, disability, sex, sexual orientation, or gender identity.            Thank you!     Thank you for choosing [place] [facility] PHYSICAL THERAPY  for your care. Our goal is always to provide you with excellent care. Hearing back from our patients is one way we can continue to improve our services. Please take a few minutes to complete the written survey that you may receive in the mail after your visit with us. Thank you!             Your Updated Medication List - Protect others around you: Learn how to safely use, store and throw away your medicines at www.disposemymeds.org.          This list is accurate as of: 11/14/17  1:21 PM.  Always use your most recent med list.                   Brand Name Dispense Instructions for use Diagnosis    albuterol 108 (90 BASE) MCG/ACT Inhaler    PROAIR HFA    2 Inhaler    Inhale 2 puffs into the lungs every 6 hours as needed INHALE 1 TO 2 PUFFS EVERY 4 TO 6 HOURS AS NEEDED    Mild intermittent asthma       amitriptyline 25 MG tablet    ELAVIL    30 tablet    TAKE ONE  TABLET BY MOUTH AT BEDTIME    Psychophysiological insomnia       aspirin 81 MG tablet      Take 1 tablet by mouth daily.        atorvastatin 40 MG tablet    LIPITOR    30 tablet    TAKE ONE TABLET BY MOUTH ONCE DAILY    Hyperlipidemia LDL goal <130       fluticasone 50 MCG/ACT spray    FLONASE    48 g    Spray 1-2 sprays into both nostrils daily    Allergic rhinitis       HM VITAMIN D3 4000 UNITS Caps   Generic drug:  Cholecalciferol      Take  by mouth.        hydrOXYzine 25 MG tablet    ATARAX    90 tablet    Take 1-2 tablets (25-50 mg) by mouth every 6 hours as needed for anxiety    Situational anxiety       methocarbamol 750 MG tablet    ROBAXIN    90 tablet    Take 1 tablet (750 mg) by mouth every 4 hours    Thoracic or lumbosacral neuritis or radiculitis, unspecified       ondansetron 8 MG tablet    ZOFRAN    18 tablet    Take 1 tablet (8 mg) by mouth every 8 hours as needed for nausea    Nausea       * sertraline 100 MG tablet    ZOLOFT    135 tablet    Take 1.5 tablets (150 mg) by mouth daily    Anxiety       * sertraline 100 MG tablet    ZOLOFT    180 tablet    Take 2 tablets (200 mg) by mouth daily    Situational anxiety       solifenacin 5 MG tablet    VESICARE    90 tablet    Take 1 tablet (5 mg) by mouth daily    Overactive bladder       * Notice:  This list has 2 medication(s) that are the same as other medications prescribed for you. Read the directions carefully, and ask your doctor or other care provider to review them with you.

## 2017-11-30 ENCOUNTER — THERAPY VISIT (OUTPATIENT)
Dept: PHYSICAL THERAPY | Facility: CLINIC | Age: 57
End: 2017-11-30
Payer: COMMERCIAL

## 2017-11-30 DIAGNOSIS — G89.29 CHRONIC BILATERAL LOW BACK PAIN WITH RIGHT-SIDED SCIATICA: ICD-10-CM

## 2017-11-30 DIAGNOSIS — M53.3 SI (SACROILIAC) JOINT DYSFUNCTION: ICD-10-CM

## 2017-11-30 DIAGNOSIS — M54.41 CHRONIC BILATERAL LOW BACK PAIN WITH RIGHT-SIDED SCIATICA: ICD-10-CM

## 2017-11-30 PROCEDURE — 97035 APP MDLTY 1+ULTRASOUND EA 15: CPT | Mod: GP | Performed by: PHYSICAL THERAPIST

## 2017-11-30 PROCEDURE — 97140 MANUAL THERAPY 1/> REGIONS: CPT | Mod: GP | Performed by: PHYSICAL THERAPIST

## 2017-11-30 PROCEDURE — 97110 THERAPEUTIC EXERCISES: CPT | Mod: GP | Performed by: PHYSICAL THERAPIST

## 2017-11-30 NOTE — PROGRESS NOTES
Subjective:    HPI                    Objective:    System    Physical Exam    General     ROS    Assessment/Plan:      SUBJECTIVE  Subjective changes as noted by pt:  Did not get any relief after the last treatment and pain is getting progressively worse. Walking and bending are very difficult. Is painful in low back and both legs front and back. Using ice. Had acupuncture and reflexology and home massager. Stopped using heel lift.   Current pain level:  8/10   Changes in function:  Yes (See Goal flowsheet attached for changes in current functional level)      Adverse reaction to treatment or activity:  None    OBJECTIVE  Changes in objective findings:  Yes,  Trunk AROM flexion 80%, extension 40%. Moderate R ant/ L post innominate with good correction. After MET trunk extension increased to 60%. Discussed trial of SI belt. Patient will attempt to purchase one online.      ASSESSMENT  Delilah continues to require intervention to meet STG and LTG's: PT  Patient has experienced an exacerbation of symptoms.  Response to therapy has shown a worsening of  pain level, ROM , posture and function  Progress made towards STG/LTG?  Yes (See Goal flowsheet attached for updates on achievement of STG and LTG)    PLAN  Continue current treatment plan until patient demonstrates readiness to progress to higher level exercises.  The following modalities have been added:  ultrasound    PTA/ATC plan:  N/A    Please refer to the daily flowsheet for treatment today, total treatment time and time spent performing 1:1 timed codes.

## 2017-11-30 NOTE — MR AVS SNAPSHOT
After Visit Summary   11/30/2017    Delilah Miranda    MRN: [id number]           Patient Information     Date Of Birth          1960        Visit Information        Provider Department      11/30/2017 10:10 AM Ileana Dimas, [place] [facility] Physical Therapy        Today's Diagnoses     Chronic bilateral low back pain with right-sided sciatica        SI (sacroiliac) joint dysfunction           Follow-ups after your visit        Your next 10 appointments already scheduled     Dec 07, 2017  9:30 AM CST   LUZ Spine with Ileana Dimas PT   [facility] ([facility])    [address]  [facility] [id number] [phone]              Who to contact     If you have questions or need follow up information about today's clinic visit or your schedule please contact [place] [facility] PHYSICAL THERAPY directly at [phone].  Normal or non-critical lab and imaging results will be communicated to you by [employer], letter or phone within 4 business days after the clinic has received the results. If you do not hear from us within 7 days, please contact the clinic through [employer] or phone. If you have a critical or abnormal lab result, we will notify you by phone as soon as possible.  Submit refill requests through [employer] or call your pharmacy and they will forward the refill request to us. Please allow 3 business days for your refill to be completed.          Additional Information About Your Visit        [employer] Information     [employer] gives you secure access to your electronic health record. If you see a primary care provider, you can also send messages to your care team and make appointments. If you have questions, please call your primary care clinic.  If you do not have a primary care provider, please call [phone] and they will assist you.        Care EveryWhere ID      This is your Care EveryWhere ID. This could be used by other organizations to access your [place] medical records  [id number]        Your Vitals Were     Last Period                   04/25/2007            Blood Pressure from Last 3 Encounters:   03/21/17 135/81   10/22/16 142/80   06/08/16 125/80    Weight from Last 3 Encounters:   03/21/17 82.3 kg (181 lb 6.4 oz)   10/22/16 81.6 kg (180 lb)   06/08/16 82.1 kg (181 lb)              We Performed the Following     MANUAL THER TECH,1+REGIONS,EA 15 MIN     THERAPEUTIC EXERCISES     ULTRASOUND THERAPY        Primary Care Provider Office Phone # Fax #    Roxana MARCIAL Cuadra PA-C [phone] [address] [facility] [address]        Equal Access to Services     JACKIE HANSEN : Hadii aad ku hadasho Soian, waaxda luqadaha, qaybta kaalmada adeegyada, mikala weber . So [facility] [phone].    ATENCIÓN: Si habla español, tiene a rodriguez disposición servicios gratuitos de asistencia lingüística. Davon al [phone].    We comply with applicable federal civil rights laws and [place] laws. We do not discriminate on the basis of race, color, national origin, age, disability, sex, sexual orientation, or gender identity.            Thank you!     Thank you for choosing [place] [facility] PHYSICAL THERAPY  for your care. Our goal is always to provide you with excellent care. Hearing back from our patients is one way we can continue to improve our services. Please take a few minutes to complete the written survey that you may receive in the mail after your visit with us. Thank you!             Your Updated Medication List - Protect others around you: Learn how to safely use, store and throw away your medicines at www.disposemymeds.org.          This list is accurate as of: 11/30/17  1:09 PM.  Always use your most recent med list.                   Brand Name Dispense Instructions for use Diagnosis     albuterol 108 (90 BASE) MCG/ACT Inhaler    PROAIR HFA    2 Inhaler    Inhale 2 puffs into the lungs every 6 hours as needed INHALE 1 TO 2 PUFFS EVERY 4 TO 6 HOURS AS NEEDED    Mild intermittent asthma       amitriptyline 25 MG tablet    ELAVIL    30 tablet    TAKE ONE TABLET BY MOUTH AT BEDTIME    Psychophysiological insomnia       aspirin 81 MG tablet      Take 1 tablet by mouth daily.        atorvastatin 40 MG tablet    LIPITOR    30 tablet    TAKE ONE TABLET BY MOUTH ONCE DAILY    Hyperlipidemia LDL goal <130       fluticasone 50 MCG/ACT spray    FLONASE    48 g    Spray 1-2 sprays into both nostrils daily    Allergic rhinitis       HM VITAMIN D3 4000 UNITS Caps   Generic drug:  Cholecalciferol      Take  by mouth.        hydrOXYzine 25 MG tablet    ATARAX    90 tablet    Take 1-2 tablets (25-50 mg) by mouth every 6 hours as needed for anxiety    Situational anxiety       methocarbamol 750 MG tablet    ROBAXIN    90 tablet    Take 1 tablet (750 mg) by mouth every 4 hours    Thoracic or lumbosacral neuritis or radiculitis, unspecified       ondansetron 8 MG tablet    ZOFRAN    18 tablet    Take 1 tablet (8 mg) by mouth every 8 hours as needed for nausea    Nausea       * sertraline 100 MG tablet    ZOLOFT    135 tablet    Take 1.5 tablets (150 mg) by mouth daily    Anxiety       * sertraline 100 MG tablet    ZOLOFT    180 tablet    Take 2 tablets (200 mg) by mouth daily    Situational anxiety       solifenacin 5 MG tablet    VESICARE    90 tablet    Take 1 tablet (5 mg) by mouth daily    Overactive bladder       * Notice:  This list has 2 medication(s) that are the same as other medications prescribed for you. Read the directions carefully, and ask your doctor or other care provider to review them with you.

## 2017-12-02 ENCOUNTER — MYC MEDICAL ADVICE (OUTPATIENT)
Dept: FAMILY MEDICINE | Facility: CLINIC | Age: 57
End: 2017-12-02

## 2017-12-05 ENCOUNTER — OFFICE VISIT (OUTPATIENT)
Dept: FAMILY MEDICINE | Facility: CLINIC | Age: 57
End: 2017-12-05
Payer: COMMERCIAL

## 2017-12-05 VITALS
WEIGHT: 188.8 LBS | TEMPERATURE: 98.1 F | DIASTOLIC BLOOD PRESSURE: 80 MMHG | HEIGHT: 62 IN | SYSTOLIC BLOOD PRESSURE: 120 MMHG | OXYGEN SATURATION: 97 % | BODY MASS INDEX: 34.74 KG/M2 | HEART RATE: 75 BPM

## 2017-12-05 DIAGNOSIS — R73.01 ELEVATED FASTING GLUCOSE: Primary | ICD-10-CM

## 2017-12-05 DIAGNOSIS — F51.04 PSYCHOPHYSIOLOGICAL INSOMNIA: ICD-10-CM

## 2017-12-05 DIAGNOSIS — N32.81 OVERACTIVE BLADDER: ICD-10-CM

## 2017-12-05 DIAGNOSIS — Z11.59 NEED FOR HEPATITIS C SCREENING TEST: ICD-10-CM

## 2017-12-05 DIAGNOSIS — F41.9 ANXIETY: ICD-10-CM

## 2017-12-05 DIAGNOSIS — F41.8 SITUATIONAL ANXIETY: ICD-10-CM

## 2017-12-05 DIAGNOSIS — M62.838 MUSCLE SPASM: ICD-10-CM

## 2017-12-05 DIAGNOSIS — E78.5 HYPERLIPIDEMIA LDL GOAL <130: Primary | ICD-10-CM

## 2017-12-05 DIAGNOSIS — M25.551 HIP PAIN, RIGHT: ICD-10-CM

## 2017-12-05 LAB
ALBUMIN SERPL-MCNC: 4.1 G/DL (ref 3.4–5)
ALP SERPL-CCNC: 87 U/L (ref 40–150)
ALT SERPL W P-5'-P-CCNC: 37 U/L (ref 0–50)
ANION GAP SERPL CALCULATED.3IONS-SCNC: 10 MMOL/L (ref 3–14)
AST SERPL W P-5'-P-CCNC: 19 U/L (ref 0–45)
BILIRUB SERPL-MCNC: 0.5 MG/DL (ref 0.2–1.3)
BUN SERPL-MCNC: 14 MG/DL (ref 7–30)
CALCIUM SERPL-MCNC: 9 MG/DL (ref 8.5–10.1)
CHLORIDE SERPL-SCNC: 108 MMOL/L (ref 94–109)
CHOLEST SERPL-MCNC: 206 MG/DL
CO2 SERPL-SCNC: 23 MMOL/L (ref 20–32)
CREAT SERPL-MCNC: 0.63 MG/DL (ref 0.52–1.04)
GFR SERPL CREATININE-BSD FRML MDRD: >90 ML/MIN/1.7M2
GLUCOSE SERPL-MCNC: 121 MG/DL (ref 70–99)
HCV AB SERPL QL IA: NONREACTIVE
HDLC SERPL-MCNC: 75 MG/DL
LDLC SERPL CALC-MCNC: 96 MG/DL
NONHDLC SERPL-MCNC: 131 MG/DL
POTASSIUM SERPL-SCNC: 3.6 MMOL/L (ref 3.4–5.3)
PROT SERPL-MCNC: 7.2 G/DL (ref 6.8–8.8)
SODIUM SERPL-SCNC: 141 MMOL/L (ref 133–144)
TRIGL SERPL-MCNC: 176 MG/DL

## 2017-12-05 PROCEDURE — 80053 COMPREHEN METABOLIC PANEL: CPT | Performed by: PHYSICIAN ASSISTANT

## 2017-12-05 PROCEDURE — 80061 LIPID PANEL: CPT | Performed by: PHYSICIAN ASSISTANT

## 2017-12-05 PROCEDURE — 99214 OFFICE O/P EST MOD 30 MIN: CPT | Performed by: PHYSICIAN ASSISTANT

## 2017-12-05 PROCEDURE — 86803 HEPATITIS C AB TEST: CPT | Performed by: PHYSICIAN ASSISTANT

## 2017-12-05 PROCEDURE — 36415 COLL VENOUS BLD VENIPUNCTURE: CPT | Performed by: PHYSICIAN ASSISTANT

## 2017-12-05 RX ORDER — METHOCARBAMOL 500 MG/1
1000 TABLET, FILM COATED ORAL 3 TIMES DAILY PRN
Qty: 90 TABLET | Refills: 1 | Status: ON HOLD | OUTPATIENT
Start: 2017-12-05 | End: 2018-11-16

## 2017-12-05 RX ORDER — SERTRALINE HYDROCHLORIDE 100 MG/1
150 TABLET, FILM COATED ORAL DAILY
Qty: 135 TABLET | Refills: 1 | Status: SHIPPED | OUTPATIENT
Start: 2017-12-05 | End: 2018-03-29

## 2017-12-05 RX ORDER — HYDROXYZINE HYDROCHLORIDE 25 MG/1
25-50 TABLET, FILM COATED ORAL EVERY 6 HOURS PRN
Qty: 90 TABLET | Refills: 1 | Status: SHIPPED | OUTPATIENT
Start: 2017-12-05 | End: 2018-04-20

## 2017-12-05 RX ORDER — ATORVASTATIN CALCIUM 40 MG/1
40 TABLET, FILM COATED ORAL DAILY
Qty: 90 TABLET | Refills: 1 | Status: CANCELLED | OUTPATIENT
Start: 2017-12-05

## 2017-12-05 RX ORDER — SERTRALINE HYDROCHLORIDE 100 MG/1
200 TABLET, FILM COATED ORAL DAILY
Qty: 180 TABLET | Refills: 1 | Status: CANCELLED | OUTPATIENT
Start: 2017-12-05

## 2017-12-05 RX ORDER — SOLIFENACIN SUCCINATE 5 MG/1
5 TABLET, FILM COATED ORAL DAILY
Qty: 90 TABLET | Refills: 1 | Status: SHIPPED | OUTPATIENT
Start: 2017-12-05 | End: 2018-06-07

## 2017-12-05 RX ORDER — ATORVASTATIN CALCIUM 40 MG/1
40 TABLET, FILM COATED ORAL DAILY
Qty: 90 TABLET | Refills: 3 | Status: SHIPPED | OUTPATIENT
Start: 2017-12-05 | End: 2018-11-06

## 2017-12-05 NOTE — MR AVS SNAPSHOT
After Visit Summary   12/5/2017    Delilah Miranda    MRN: [id number]           Patient Information     Date Of Birth          1960        Visit Information        Provider Department      12/5/2017 7:30 AM Roxana Cuadra PA-C [facility]        Today's Diagnoses     Screening for malignant neoplasm of cervix        Need for hepatitis C screening test        Need for prophylactic vaccination and inoculation against influenza        Need for prophylactic vaccination with tetanus-diphtheria (TD)           Follow-ups after your visit        Your next 10 appointments already scheduled     Dec 07, 2017  9:30 AM CST   [facility] with Ileana Dimas, PT   [facility] ([facility])    [address]  [facility] [address] [phone]              Who to contact     If you have questions or need follow up information about today's clinic visit or your schedule please contact [facility] directly at [phone].  Normal or non-critical lab and imaging results will be communicated to you by YinYangMaphart, letter or phone within 4 business days after the clinic has received the results. If you do not hear from us within 7 days, please contact the clinic through Starlinet or phone. If you have a critical or abnormal lab result, we will notify you by phone as soon as possible.  Submit refill requests through Octavian or call your pharmacy and they will forward the refill request to us. Please allow 3 business days for your refill to be completed.          Additional Information About Your Visit        MyChart Information     Octavian gives you secure access to your electronic health record. If you see a primary care provider, you can also send messages to your care team and make appointments. If you have questions, please call your primary care clinic.  If you do not have a primary care provider, please call  "[phone] and they will assist you.        Care EveryWhere ID     This is your Care EveryWhere ID. This could be used by other organizations to access your [place] medical records  [id number]        Your Vitals Were     Pulse Temperature Height Last Period Pulse Oximetry BMI (Body Mass Index)    75 98.1  F (36.7  C) (Oral) 5' 2\" (1.575 m) 04/25/2007 97% 34.53 kg/m2       Blood Pressure from Last 3 Encounters:   12/05/17 120/80   03/21/17 135/81   10/22/16 142/80    Weight from Last 3 Encounters:   12/05/17 188 lb 12.8 oz (85.6 kg)   03/21/17 181 lb 6.4 oz (82.3 kg)   10/22/16 180 lb (81.6 kg)              Today, you had the following     No orders found for display       Primary Care Provider Office Phone # Fax #    Roxana Cuadra PA-C [phone] [address] [facility] [address]        Equal Access to Services     [facility]: Hadii elder ku hadasho Soomaali, waaxda luqadaha, qaybta kaalmada adeegyamartita, mikala weber . So [facility] [phone].    ATENCIÓN: Si habla español, tiene a rodriguez disposición servicios gratuitos de asistencia lingüística. Llame al [phone].    We comply with applicable federal civil rights laws and [place] laws. We do not discriminate on the basis of race, color, national origin, age, disability, sex, sexual orientation, or gender identity.            Thank you!     Thank you for choosing [facility]  for your care. Our goal is always to provide you with excellent care. Hearing back from our patients is one way we can continue to improve our services. Please take a few minutes to complete the written survey that you may receive in the mail after your visit with us. Thank you!             Your Updated Medication List - Protect others around you: Learn how to safely use, store and throw away your medicines at www.disposemymeds.org.          This list is accurate as of: 12/5/17  7:56 AM.  Always use your most recent " med list.                   Brand Name Dispense Instructions for use Diagnosis    albuterol 108 (90 BASE) MCG/ACT Inhaler    PROAIR HFA    2 Inhaler    Inhale 2 puffs into the lungs every 6 hours as needed INHALE 1 TO 2 PUFFS EVERY 4 TO 6 HOURS AS NEEDED    Mild intermittent asthma       amitriptyline 25 MG tablet    ELAVIL    30 tablet    TAKE ONE TABLET BY MOUTH AT BEDTIME    Psychophysiological insomnia       aspirin 81 MG tablet      Take 1 tablet by mouth daily.        atorvastatin 40 MG tablet    LIPITOR    30 tablet    TAKE ONE TABLET BY MOUTH ONCE DAILY    Hyperlipidemia LDL goal <130       fluticasone 50 MCG/ACT spray    FLONASE    48 g    Spray 1-2 sprays into both nostrils daily    Allergic rhinitis       HM VITAMIN D3 4000 UNITS Caps   Generic drug:  Cholecalciferol      Take  by mouth.        hydrOXYzine 25 MG tablet    ATARAX    90 tablet    Take 1-2 tablets (25-50 mg) by mouth every 6 hours as needed for anxiety    Situational anxiety       methocarbamol 750 MG tablet    ROBAXIN    90 tablet    Take 1 tablet (750 mg) by mouth every 4 hours    Thoracic or lumbosacral neuritis or radiculitis, unspecified       ondansetron 8 MG tablet    ZOFRAN    18 tablet    Take 1 tablet (8 mg) by mouth every 8 hours as needed for nausea    Nausea       * sertraline 100 MG tablet    ZOLOFT    135 tablet    Take 1.5 tablets (150 mg) by mouth daily    Anxiety       * sertraline 100 MG tablet    ZOLOFT    180 tablet    Take 2 tablets (200 mg) by mouth daily    Situational anxiety       solifenacin 5 MG tablet    VESICARE    90 tablet    Take 1 tablet (5 mg) by mouth daily    Overactive bladder       * Notice:  This list has 2 medication(s) that are the same as other medications prescribed for you. Read the directions carefully, and ask your doctor or other care provider to review them with you.

## 2017-12-05 NOTE — PROGRESS NOTES
SUBJECTIVE:                                                    Delilah Miranda is a 57 year old female who presents to clinic today for the following health issues:      Musculoskeletal Problem     History of Present Illness     Asthma:     Cough::  No    Wheezing::  No    Dyspnea::  No    Use of rescue inhaler::  Other    Taking Asthma medication as prescribed::  Yes    Asthma triggers::  None    ER/UC Visits or Admissions::  None    Depression & Anxiety Follow-up:     Depression/Anxiety:  Anxiety only    Status since last visit::  Stable    Other associated symptoms of anxiety::  None    Significant life event::  No    Current substance use::  None    Depression symptoms::  None    Hyperlipidemia:     Low fat/chol diet rating::  Fair    Taking Statins::  YES    Lipid Medications or Supplements::  Flax seed    Diet:  Regular (no restrictions)  Frequency of exercise:  4-5 days/week  Duration of exercise:  15-30 minutes  Taking medications regularly:  Yes  Medication side effects:  None  Additional concerns today:  YES      Joint Pain    Onset: about a year and a half    Description:   Location: left hip and right hip  Character: Sharp, Stabbing, Burning and shooting    Intensity: moderate    Progression of Symptoms: worse    Accompanying Signs & Symptoms:  Other symptoms: radiation of pain to both legs all the way down    History:   Previous similar pain: no       Precipitating factors:   Trauma or overuse: no     Alleviating factors:  Improved by: nothing    Therapies Tried and outcome: PT with no relief   Patient did received injection at Ortho a few weeks ago, helped but symptoms have returned.           Problem list and histories reviewed & adjusted, as indicated.  Additional history: as documented        Patient Active Problem List   Diagnosis     Allergic rhinitis     Thoracic or lumbosacral neuritis or radiculitis, unspecified     Mild intermittent asthma     Mild major depression (H)     OA  "(osteoarthritis) of knee     Hyperlipidemia LDL goal <160     Elevated fasting glucose     Vitamin D deficiency     Heartburn     Snoring     Urinary incontinence, nocturnal enuresis     Anxiety     Overactive bladder     Situational anxiety     Chronic bilateral low back pain with right-sided sciatica     SI (sacroiliac) joint dysfunction     Past Surgical History:   Procedure Laterality Date     C NONSPECIFIC PROCEDURE      meniscus       Social History   Substance Use Topics     Smoking status: Former Smoker     Smokeless tobacco: Never Used      Comment: smoked 1 pk qd in her twenties quit at age 26     Alcohol use 0.0 oz/week     0 Standard drinks or equivalent per week      Comment: social-rarely     Family History   Problem Relation Age of Onset     CANCER Maternal Grandmother      CANCER Paternal Grandmother      CANCER Paternal Grandfather      HEART DISEASE Father      Hypertension Father      DIABETES Father      DIABETES Sister      DIABETES Maternal Grandfather              ROS:  Constitutional, HEENT, cardiovascular, pulmonary, gi and gu systems are negative, except as otherwise noted.      OBJECTIVE:   /80 (BP Location: Right arm, Patient Position: Chair, Cuff Size: Adult Large)  Pulse 75  Temp 98.1  F (36.7  C) (Oral)  Ht 5' 2\" (1.575 m)  Wt 188 lb 12.8 oz (85.6 kg)  LMP 04/25/2007  SpO2 97%  BMI 34.53 kg/m2  Body mass index is 34.53 kg/(m^2).  GENERAL APPEARANCE: healthy, alert and no distress  RESP: lungs clear to auscultation - no rales, rhonchi or wheezes  CV: regular rates and rhythm, normal S1 S2, no S3 or S4 and no murmur, click or rub  MS: LROM right hip secondary to pain. extremities normal- no gross deformities noted  PSYCH: mentation appears normal and affect normal/bright        ASSESSMENT/PLAN:             1. Hyperlipidemia LDL goal <130  Await labs.   - Lipid panel reflex to direct LDL Fasting  - Comprehensive metabolic panel    2. Psychophysiological insomnia  Stable.   - " amitriptyline (ELAVIL) 25 MG tablet; TAKE ONE TABLET BY MOUTH AT BEDTIME  Dispense: 90 tablet; Refill: 1    3. Situational anxiety  Stable.   - hydrOXYzine (ATARAX) 25 MG tablet; Take 1-2 tablets (25-50 mg) by mouth every 6 hours as needed for anxiety  Dispense: 90 tablet; Refill: 1    4. Overactive bladder  Stable.   - solifenacin (VESICARE) 5 MG tablet; Take 1 tablet (5 mg) by mouth daily  Dispense: 90 tablet; Refill: 1    5. Anxiety  Stable.   - sertraline (ZOLOFT) 100 MG tablet; Take 1.5 tablets (150 mg) by mouth daily  Dispense: 135 tablet; Refill: 1    6. Muscle spasm    - methocarbamol (ROBAXIN) 500 MG tablet; Take 2 tablets (1,000 mg) by mouth 3 times daily as needed for muscle spasms  Dispense: 90 tablet; Refill: 1    7. Need for hepatitis C screening test    - Hepatitis C Screen Reflex to HCV RNA Quant and Genotype    8. Hip pain, right  Recommend f/u with ortho., continue P.T.        F/u in 6 months. Sooner if symptoms worsen.     Roxana Cuadra PA-C  [facility]  Answers for HPI/ROS submitted by the patient on 12/5/2017   PHQ-2 Score: 1

## 2017-12-05 NOTE — LETTER
My Depression Action Plan  Name: Delilah Miranda   Date of Birth 1960  Date: 12/5/2017    My doctor: Roxana Cuadra   My clinic: [address] [address] [phone]          GREEN    ZONE   Good Control    What it looks like:     Things are going generally well. You have normal up s and down s. You may even feel depressed from time to time, but bad moods usually last less than a day.   What you need to do:  1. Continue to care for yourself (see self care plan)  2. Check your depression survival kit and update it as needed  3. Follow your physician s recommendations including any medication.  4. Do not stop taking medication unless you consult with your physician first.           YELLOW         ZONE Getting Worse    What it looks like:     Depression is starting to interfere with your life.     It may be hard to get out of bed; you may be starting to isolate yourself from others.    Symptoms of depression are starting to last most all day and this has happened for several days.     You may have suicidal thoughts but they are not constant.   What you need to do:     1. Call your care team, your response to treatment will improve if you keep your care team informed of your progress. Yellow periods are signs an adjustment may need to be made.     2. Continue your self-care, even if you have to fake it!    3. Talk to someone in your support network    4. Open up your depression survival kit           RED    ZONE Medical Alert - Get Help    What it looks like:     Depression is seriously interfering with your life.     You may experience these or other symptoms: You can t get out of bed most days, can t work or engage in other necessary activities, you have trouble taking care of basic hygiene, or basic responsibilities, thoughts of suicide or death that will not go away, self-injurious behavior.     What you need to do:  1. Call your care  team and request a same-day appointment. If they are not available (weekends or after hours) call your local crisis line, emergency room or 911.      Electronically signed by: Roxana Cuadra, December 5, 2017    Depression Self Care Plan / Survival Kit    Self-Care for Depression  Here s the deal. Your body and mind are really not as separate as most people think.  What you do and think affects how you feel and how you feel influences what you do and think. This means if you do things that people who feel good do, it will help you feel better.  Sometimes this is all it takes.  There is also a place for medication and therapy depending on how severe your depression is, so be sure to consult with your medical provider and/ or Behavioral Health Consultant if your symptoms are worsening or not improving.     In order to better manage my stress, I will:    Exercise  Get some form of exercise, every day. This will help reduce pain and release endorphins, the  feel good  chemicals in your brain. This is almost as good as taking antidepressants!  This is not the same as joining a gym and then never going! (they count on that by the way ) It can be as simple as just going for a walk or doing some gardening, anything that will get you moving.      Hygiene   Maintain good hygiene (Get out of bed in the morning, Make your bed, Brush your teeth, Take a shower, and Get dressed like you were going to work, even if you are unemployed).  If your clothes don't fit try to get ones that do.    Diet  I will strive to eat foods that are good for me, drink plenty of water, and avoid excessive sugar, caffeine, alcohol, and other mood-altering substances.  Some foods that are helpful in depression are: complex carbohydrates, B vitamins, flaxseed, fish or fish oil, fresh fruits and vegetables.    Psychotherapy  I agree to participate in Individual Therapy (if recommended).    Medication  If prescribed medications, I agree to take them.   Missing doses can result in serious side effects.  I understand that drinking alcohol, or other illicit drug use, may cause potential side effects.  I will not stop my medication abruptly without first discussing it with my provider.    Staying Connected With Others  I will stay in touch with my friends, family members, and my primary care provider/team.    Use your imagination  Be creative.  We all have a creative side; it doesn t matter if it s oil painting, sand castles, or mud pies! This will also kick up the endorphins.    Witness Beauty  (AKA stop and smell the roses) Take a look outside, even in mid-winter. Notice colors, textures. Watch the squirrels and birds.     Service to others  Be of service to others.  There is always someone else in need.  By helping others we can  get out of ourselves  and remember the really important things.  This also provides opportunities for practicing all the other parts of the program.    Humor  Laugh and be silly!  Adjust your TV habits for less news and crime-drama and more comedy.    Control your stress  Try breathing deep, massage therapy, biofeedback, and meditation. Find time to relax each day.     My support system    Clinic Contact:  Phone number:    Contact 1:  Phone number:    Contact 2:  Phone number:    Rastafarian/:  Phone number:    Therapist:  Phone number:    Local crisis center:    Phone number:    Other community support:  Phone number:

## 2017-12-05 NOTE — LETTER
My Asthma Action Plan  Name: Delilah Miranda   YOB: 1960  Date: 12/5/2017   My doctor: Roxana Cuadra PA-C   My clinic: [facility]        My Control Medicine:   My Rescue Medicine:    My Asthma Severity:   Avoid your asthma triggers:                GREEN ZONE   Good Control    I feel good    No cough or wheeze    Can work, sleep and play without asthma symptoms       Take your asthma control medicine every day.     1. If exercise triggers your asthma, take your rescue medication    15 minutes before exercise or sports, and    During exercise if you have asthma symptoms  2. Spacer to use with inhaler: If you have a spacer, make sure to use it with your inhaler             YELLOW ZONE Getting Worse  I have ANY of these:    I do not feel good    Cough or wheeze    Chest feels tight    Wake up at night   1. Keep taking your Green Zone medications  2. Start taking your rescue medicine:    every 20 minutes for up to 1 hour. Then every 4 hours for 24-48 hours.  3. If you stay in the Yellow Zone for more than 12-24 hours, contact your doctor.  4. If you do not return to the Green Zone in 12-24 hours or you get worse, start taking your oral steroid medicine if prescribed by your provider.           RED ZONE Medical Alert - Get Help  I have ANY of these:    I feel awful    Medicine is not helping    Breathing getting harder    Trouble walking or talking    Nose opens wide to breathe       1. Take your rescue medicine NOW  2. If your provider has prescribed an oral steroid medicine, start taking it NOW  3. Call your doctor NOW  4. If you are still in the Red Zone after 20 minutes and you have not reached your doctor:    Take your rescue medicine again and    Call 911 or go to the emergency room right away    See your regular doctor within 2 weeks of an Emergency Room or Urgent Care visit for follow-up treatment.        Electronically signed by: Roxana Cuadra, December 5,  2017    Annual Reminders:  Meet with Asthma Educator,  Flu Shot in the Fall, consider Pneumonia Vaccination for patients with asthma (aged 19 and older).    Pharmacy:    [facility] - [place], [place] - [address]  [facility] - [place], [place] - [address]  [facility] - [place], [place] - [address]  [id number] - [place], [place] - [address] AT [facility] [address] [place] [address]                    Asthma Triggers  How To Control Things That Make Your Asthma Worse    Triggers are things that make your asthma worse.  Look at the list below to help you find your triggers and what you can do about them.  You can help prevent asthma flare-ups by staying away from your triggers.      Trigger                                                          What you can do   Cigarette Smoke  Tobacco smoke can make asthma worse. Do not allow smoking in your home, car or around you.  Be sure no one smokes at a child s day care or school.  If you smoke, ask your health care provider for ways to help you quit.  Ask family members to quit too.  Ask your health care provider for a referral to Quit Plan to help you quit smoking, or call 2-807-667-PLAN.     Colds, Flu, Bronchitis  These are common triggers of asthma. Wash your hands often.  Don t touch your eyes, nose or mouth.  Get a flu shot every year.     Dust Mites  These are tiny bugs that live in cloth or carpet. They are too small to see. Wash sheets and blankets in hot water every week.   Encase pillows and mattress in dust mite proof covers.  Avoid having carpet if you can. If you have carpet, vacuum weekly.   Use a dust mask and HEPA vacuum.   Pollen and Outdoor Mold  Some people are allergic to trees, grass, or weed pollen, or molds. Try to keep your windows closed.  Limit time out doors when pollen count is high.   Ask you health care provider about taking medicine during allergy  season.     Animal Dander  Some people are allergic to skin flakes, urine or saliva from pets with fur or feathers. Keep pets with fur or feathers out of your home.    If you can t keep the pet outdoors, then keep the pet out of your bedroom.  Keep the bedroom door closed.  Keep pets off cloth furniture and away from stuffed toys.     Mice, Rats, and Cockroaches  Some people are allergic to the waste from these pests.   Cover food and garbage.  Clean up spills and food crumbs.  Store grease in the refrigerator.   Keep food out of the bedroom.   Indoor Mold  This can be a trigger if your home has high moisture. Fix leaking faucets, pipes, or other sources of water.   Clean moldy surfaces.  Dehumidify basement if it is damp and smelly.   Smoke, Strong Odors, and Sprays  These can reduce air quality. Stay away from strong odors and sprays, such as perfume, powder, hair spray, paints, smoke incense, paint, cleaning products, candles and new carpet.   Exercise or Sports  Some people with asthma have this trigger. Be active!  Ask your doctor about taking medicine before sports or exercise to prevent symptoms.    Warm up for 5-10 minutes before and after sports or exercise.     Other Triggers of Asthma  Cold air:  Cover your nose and mouth with a scarf.  Sometimes laughing or crying can be a trigger.  Some medicines and food can trigger asthma.

## 2017-12-06 ASSESSMENT — ASTHMA QUESTIONNAIRES: ACT_TOTALSCORE: 25

## 2017-12-07 ENCOUNTER — THERAPY VISIT (OUTPATIENT)
Dept: PHYSICAL THERAPY | Facility: CLINIC | Age: 57
End: 2017-12-07
Payer: COMMERCIAL

## 2017-12-07 DIAGNOSIS — M53.3 SI (SACROILIAC) JOINT DYSFUNCTION: ICD-10-CM

## 2017-12-07 DIAGNOSIS — G89.29 CHRONIC BILATERAL LOW BACK PAIN WITH RIGHT-SIDED SCIATICA: ICD-10-CM

## 2017-12-07 DIAGNOSIS — M54.41 CHRONIC BILATERAL LOW BACK PAIN WITH RIGHT-SIDED SCIATICA: ICD-10-CM

## 2017-12-07 PROCEDURE — 97035 APP MDLTY 1+ULTRASOUND EA 15: CPT | Mod: GP | Performed by: PHYSICAL THERAPIST

## 2017-12-07 PROCEDURE — 97110 THERAPEUTIC EXERCISES: CPT | Mod: GP | Performed by: PHYSICAL THERAPIST

## 2017-12-07 NOTE — PROGRESS NOTES
Subjective:    HPI                    Objective:    System    Physical Exam    General     ROS    Assessment/Plan:      SUBJECTIVE  Subjective changes as noted by pt:  Ordered SI belt online and started using it since Sunday. LB feels better with the SI belt. Worked 2 shifts with belt. SI feels off . Pain in is in R buttock and right anterior thigh.    Current pain level:  7/10   Changes in function:  Yes (See Goal flowsheet attached for changes in current functional level)     Adverse reaction to treatment or activity:  None    OBJECTIVE  Changes in objective findings:  Yes, Trunk AROM flexion 100%, extension 80%. Normal pelvic position today. Resumed some core exercises. Shown proper application of SI belt supine directly on skin. Will wear daily for at least 2 weeks. Going to [place] next week. Will use scooter to limit walking.      ASSESSMENT  Delilah continues to require intervention to meet STG and LTG's: PT  Patient's symptoms are resolving.  Patient is ready to progress to more complex exercises.  Response to therapy has shown an improvement in  pain level, ROM  and function  Progress made towards STG/LTG?  Yes (See Goal flowsheet attached for updates on achievement of STG and LTG)    PLAN  Current treatment program is being advanced to more complex exercises.    PTA/ATC plan:  N/A    Please refer to the daily flowsheet for treatment today, total treatment time and time spent performing 1:1 timed codes.

## 2017-12-07 NOTE — MR AVS SNAPSHOT
After Visit Summary   12/7/2017    Delilah Miranda    MRN: [id number]           Patient Information     Date Of Birth          1960        Visit Information        Provider Department      12/7/2017 9:30 AM Ileana Dimas PT [place] [facility] Physical Therapy        Today's Diagnoses     Chronic bilateral low back pain with right-sided sciatica        SI (sacroiliac) joint dysfunction           Follow-ups after your visit        Who to contact     If you have questions or need follow up information about today's clinic visit or your schedule please contact [place] [facility] PH[facility] directly at [phone].  Normal or non-critical lab and imaging results will be communicated to you by MyChart, letter or phone within 4 business days after the clinic has received the results. If you do not hear from us within 7 days, please contact the clinic through myDockethart or phone. If you have a critical or abnormal lab result, we will notify you by phone as soon as possible.  Submit refill requests through OfficeDrop or call your pharmacy and they will forward the refill request to us. Please allow 3 business days for your refill to be completed.          Additional Information About Your Visit        MyChart Information     OfficeDrop gives you secure access to your electronic health record. If you see a primary care provider, you can also send messages to your care team and make appointments. If you have questions, please call your primary care clinic.  If you do not have a primary care provider, please call [phone] and they will assist you.        Care EveryWhere ID     This is your Care EveryWhere ID. This could be used by other organizations to access your [facility] medical records  [id number]        Your Vitals Were     Last Period                   04/25/2007            Blood Pressure from Last 3 Encounters:   12/05/17 120/80   03/21/17  135/81   10/22/16 142/80    Weight from Last 3 Encounters:   12/05/17 85.6 kg (188 lb 12.8 oz)   03/21/17 82.3 kg (181 lb 6.4 oz)   10/22/16 81.6 kg (180 lb)              We Performed the Following     THERAPEUTIC EXERCISES     ULTRASOUND THERAPY        Primary Care Provider Office Phone # Fax #    Roxana CEJA ANDREW Cuadra [phone]       [address] [facility] [address]        Equal Access to Services     JACKIE HANSEN : Hadii aad ku hadasho Soomaali, waaxda luqadaha, qaybta kaalmada adeegyada, waxay idiin hayaan adedarren khcorrina weber . So [facility] [phone].    ATENCIÓN: Si habla español, tiene a rodriguez disposición servicios gratuitos de asistencia lingüística. [facility] [phone].    We comply with applicable federal civil rights laws and [place] laws. We do not discriminate on the basis of race, color, national origin, age, disability, sex, sexual orientation, or gender identity.            Thank you!     Thank you for choosing [place] [facility] PHYSICAL THERAPY  for your care. Our goal is always to provide you with excellent care. Hearing back from our patients is one way we can continue to improve our services. Please take a few minutes to complete the written survey that you may receive in the mail after your visit with us. Thank you!             Your Updated Medication List - Protect others around you: Learn how to safely use, store and throw away your medicines at www.disposemymeds.org.          This list is accurate as of: 12/7/17 11:07 AM.  Always use your most recent med list.                   Brand Name Dispense Instructions for use Diagnosis    albuterol 108 (90 BASE) MCG/ACT Inhaler    PROAIR HFA    2 Inhaler    Inhale 2 puffs into the lungs every 6 hours as needed INHALE 1 TO 2 PUFFS EVERY 4 TO 6 HOURS AS NEEDED    Mild intermittent asthma       amitriptyline 25 MG tablet    ELAVIL    90 tablet    TAKE ONE TABLET BY MOUTH AT BEDTIME     Psychophysiological insomnia       aspirin 81 MG tablet      Take 1 tablet by mouth daily.        atorvastatin 40 MG tablet    LIPITOR    90 tablet    Take 1 tablet (40 mg) by mouth daily    Hyperlipidemia LDL goal <130       fluticasone 50 MCG/ACT spray    FLONASE    48 g    Spray 1-2 sprays into both nostrils daily    Allergic rhinitis       HM VITAMIN D3 4000 UNITS Caps   Generic drug:  Cholecalciferol      Take  by mouth.        hydrOXYzine 25 MG tablet    ATARAX    90 tablet    Take 1-2 tablets (25-50 mg) by mouth every 6 hours as needed for anxiety    Situational anxiety       * methocarbamol 750 MG tablet    ROBAXIN    90 tablet    Take 1 tablet (750 mg) by mouth every 4 hours    Thoracic or lumbosacral neuritis or radiculitis, unspecified       * methocarbamol 500 MG tablet    ROBAXIN    90 tablet    Take 2 tablets (1,000 mg) by mouth 3 times daily as needed for muscle spasms    Muscle spasm       ondansetron 8 MG tablet    ZOFRAN    18 tablet    Take 1 tablet (8 mg) by mouth every 8 hours as needed for nausea    Nausea       * sertraline 100 MG tablet    ZOLOFT    180 tablet    Take 2 tablets (200 mg) by mouth daily    Situational anxiety       * sertraline 100 MG tablet    ZOLOFT    135 tablet    Take 1.5 tablets (150 mg) by mouth daily    Anxiety       solifenacin 5 MG tablet    VESICARE    90 tablet    Take 1 tablet (5 mg) by mouth daily    Overactive bladder       * Notice:  This list has 4 medication(s) that are the same as other medications prescribed for you. Read the directions carefully, and ask your doctor or other care provider to review them with you.

## 2017-12-07 NOTE — LETTER
[facility] PHYSICAL THERAPY  [address]  [facility] [address]  [phone]    2018    Re: Delilah Miranda   :   1960  MRN:  [id number]   REFERRING PHYSICIAN:   Geraldo Fitzpatrick    [facility] PHYSICAL THERAPY  Date of Initial Evaluation:  17  Visits:  Rxs Used: 8  Reason for Referral: Chronic bilateral low back pain with right-sided sciatica  SI (sacroiliac) joint dysfunction    DISCHARGE REPORT  Progress reporting period is from 17 to 17.     SUBJECTIVE  Subjective: Ordered SI belt online and started using it since . LB feels better with the SI belt. Worked 2 shifts with belt. SI feels off . Pain in is in R buttock and right anterior thigh.    Current Pain level: 7/10.  Initial Pain level: 6/10.        Patient has failed to return to therapy so current objective findings are unknown.  The subjective and objective information are from the last SOAP note on this patient.    OBJECTIVE  Objective: Trunk AROM flexion 100%, extension 80%. Normal pelvic position today. Resumed some core exercises. Shown proper application of SI belt supine directly on skin. Will wear daily for at least 2 weeks. Going to [place] next week. Will use scooter to limit walking.       ASSESSMENT/PLAN  Updated problem list and treatment plan: Diagnosis 1: LB and hip pain with mild lumbar stenosis and R anterior pelvic tilt.  Pain -  home program.  Decreased strength - home program.  Impaired muscle performance - home program.  Decreased function - home program.  STG/LTGs have been met or progress has been made towards goals:  Yes (See Goal flow sheet completed today.).  Assessment of Progress: The patient has not returned to therapy. Current status is unknown.  Self Management Plans:  Patient has been instructed in a home treatment program.    Delilah continues to require the following intervention to meet STG  and LTG's: PT intervention is no longer required to meet STG/LTG.  The patient failed to complete scheduled/ordered appointments so current information is unknown.  We will discharge this patient from PT.    Thank you for your referral.    INQUIRIES  Therapist:  Ileana Dimas, PT   [facility] [place] [facility]  [address] [place] [address]  Phone: [phone]  Fax: [phone]

## 2017-12-08 ENCOUNTER — TRANSFERRED RECORDS (OUTPATIENT)
Dept: HEALTH INFORMATION MANAGEMENT | Facility: CLINIC | Age: 57
End: 2017-12-08

## 2017-12-13 ENCOUNTER — TELEPHONE (OUTPATIENT)
Dept: FAMILY MEDICINE | Facility: CLINIC | Age: 57
End: 2017-12-13

## 2017-12-13 NOTE — TELEPHONE ENCOUNTER
12/13/2017    Call Regarding Preventive Health Screening Cervical/PAP    Attempt 2    Message on voicemail     Comments: Clinic made 1 prior attempt       Outreach   CC

## 2017-12-21 NOTE — TELEPHONE ENCOUNTER
Patient is scheduled for physical,labs and pap smear.          Outreach ,  Jessica Tran

## 2018-01-10 ENCOUNTER — OFFICE VISIT (OUTPATIENT)
Dept: FAMILY MEDICINE | Facility: CLINIC | Age: 58
End: 2018-01-10
Payer: COMMERCIAL

## 2018-01-10 VITALS
BODY MASS INDEX: 34.12 KG/M2 | OXYGEN SATURATION: 96 % | HEART RATE: 95 BPM | TEMPERATURE: 98.3 F | HEIGHT: 62 IN | SYSTOLIC BLOOD PRESSURE: 135 MMHG | WEIGHT: 185.4 LBS | DIASTOLIC BLOOD PRESSURE: 76 MMHG

## 2018-01-10 DIAGNOSIS — E55.9 VITAMIN D DEFICIENCY: ICD-10-CM

## 2018-01-10 DIAGNOSIS — Z23 NEED FOR PROPHYLACTIC VACCINATION WITH TETANUS-DIPHTHERIA (TD): ICD-10-CM

## 2018-01-10 DIAGNOSIS — R73.01 ELEVATED FASTING GLUCOSE: ICD-10-CM

## 2018-01-10 DIAGNOSIS — Z12.4 SCREENING FOR MALIGNANT NEOPLASM OF CERVIX: ICD-10-CM

## 2018-01-10 DIAGNOSIS — Z00.00 ROUTINE HISTORY AND PHYSICAL EXAMINATION OF ADULT: Primary | ICD-10-CM

## 2018-01-10 LAB
DEPRECATED CALCIDIOL+CALCIFEROL SERPL-MC: 46 UG/L (ref 20–75)
HBA1C MFR BLD: 6 % (ref 4.3–6)

## 2018-01-10 PROCEDURE — G0145 SCR C/V CYTO,THINLAYER,RESCR: HCPCS | Performed by: PHYSICIAN ASSISTANT

## 2018-01-10 PROCEDURE — 90715 TDAP VACCINE 7 YRS/> IM: CPT | Performed by: PHYSICIAN ASSISTANT

## 2018-01-10 PROCEDURE — 87624 HPV HI-RISK TYP POOLED RSLT: CPT | Performed by: PHYSICIAN ASSISTANT

## 2018-01-10 PROCEDURE — 83036 HEMOGLOBIN GLYCOSYLATED A1C: CPT | Performed by: PHYSICIAN ASSISTANT

## 2018-01-10 PROCEDURE — 99396 PREV VISIT EST AGE 40-64: CPT | Performed by: PHYSICIAN ASSISTANT

## 2018-01-10 PROCEDURE — 82306 VITAMIN D 25 HYDROXY: CPT | Performed by: PHYSICIAN ASSISTANT

## 2018-01-10 PROCEDURE — 36415 COLL VENOUS BLD VENIPUNCTURE: CPT | Performed by: PHYSICIAN ASSISTANT

## 2018-01-10 ASSESSMENT — PATIENT HEALTH QUESTIONNAIRE - PHQ9
SUM OF ALL RESPONSES TO PHQ QUESTIONS 1-9: 5
SUM OF ALL RESPONSES TO PHQ QUESTIONS 1-9: 5
10. IF YOU CHECKED OFF ANY PROBLEMS, HOW DIFFICULT HAVE THESE PROBLEMS MADE IT FOR YOU TO DO YOUR WORK, TAKE CARE OF THINGS AT HOME, OR GET ALONG WITH OTHER PEOPLE: SOMEWHAT DIFFICULT
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
## 2018-01-10 NOTE — MR AVS SNAPSHOT
After Visit Summary   1/10/2018    Delilah Miranda    MRN: [id number]           Patient Information     Date Of Birth          1960        Visit Information        Provider Department      1/10/2018 7:30 AM Roxana Cuadra PA-C [facility]        Today's Diagnoses     Screening for malignant neoplasm of cervix        Need for prophylactic vaccination and inoculation against influenza        Need for prophylactic vaccination with tetanus-diphtheria (TD)          Care Instructions      Preventive Health Recommendations  Female Ages 50 - 64    Yearly exam: See your health care provider every year in order to  o Review health changes.   o Discuss preventive care.    o Review your medicines if your doctor has prescribed any.      Get a Pap test every three years (unless you have an abnormal result and your provider advises testing more often).    If you get Pap tests with HPV test, you only need to test every 5 years, unless you have an abnormal result.     You do not need a Pap test if your uterus was removed (hysterectomy) and you have not had cancer.    You should be tested each year for STDs (sexually transmitted diseases) if you're at risk.     Have a mammogram every 1 to 2 years.    Have a colonoscopy at age 50, or have a yearly FIT test (stool test). These exams screen for colon cancer.      Have a cholesterol test every 5 years, or more often if advised.    Have a diabetes test (fasting glucose) every three years. If you are at risk for diabetes, you should have this test more often.     If you are at risk for osteoporosis (brittle bone disease), think about having a bone density scan (DEXA).    Shots: Get a flu shot each year. Get a tetanus shot every 10 years.    Nutrition:     Eat at least 5 servings of fruits and vegetables each day.    Eat whole-grain bread, whole-wheat pasta and brown rice instead of white grains and rice.    Talk to your provider about Calcium and  Vitamin D.     Lifestyle    Exercise at least 150 minutes a week (30 minutes a day, 5 days a week). This will help you control your weight and prevent disease.    Limit alcohol to one drink per day.    No smoking.     Wear sunscreen to prevent skin cancer.     See your dentist every six months for an exam and cleaning.    See your eye doctor every 1 to 2 years.            Follow-ups after your visit        Your next 10 appointments already scheduled     Ovidio 10, 2018  8:15 AM CST   LAB with [facility] LAB   [facility]    [address] [place] [id number] [phone]           Please do not eat 10-12 hours before your appointment if you are coming in fasting for labs on lipids, cholesterol, or glucose (sugar). This does not apply to pregnant women. Water, hot tea and black coffee (with nothing added) are okay. Do not drink other fluids, diet soda or chew gum.              Who to contact     If you have questions or need follow up information about today's clinic visit or your schedule please contact [facility] directly at [phone].  Normal or non-critical lab and imaging results will be communicated to you by Banjohart, letter or phone within 4 business days after the clinic has received the results. If you do not hear from us within 7 days, please contact the clinic through [employer] or phone. If you have a critical or abnormal lab result, we will notify you by phone as soon as possible.  Submit refill requests through Shoplins or call your pharmacy and they will forward the refill request to us. Please allow 3 business days for your refill to be completed.          Additional Information About Your Visit        Shoplins Information     Shoplins gives you secure access to your electronic health record. If you see a primary care provider, you can also send messages to your care team and make appointments. If you have questions, please  "call your primary care clinic.  If you do not have a primary care provider, please call [phone] and they will assist you.        Care EveryWhere ID     This is your Care EveryWhere ID. This could be used by other organizations to access your [facility] medical records  [id number]        Your Vitals Were     Pulse Temperature Height Last Period Pulse Oximetry Breastfeeding?    95 98.3  F (36.8  C) (Oral) 5' 1.5\" (1.562 m) 04/25/2007 96% No    BMI (Body Mass Index)                   34.46 kg/m2            Blood Pressure from Last 3 Encounters:   01/10/18 135/76   12/05/17 120/80   03/21/17 135/81    Weight from Last 3 Encounters:   01/10/18 185 lb 6.4 oz (84.1 kg)   12/05/17 188 lb 12.8 oz (85.6 kg)   03/21/17 181 lb 6.4 oz (82.3 kg)              Today, you had the following     No orders found for display       Primary Care Provider Office Phone # Fax #    Roxana Cuadra PA-C [phone] [address] [facility] [address]        Equal Access to Services     LARS HANSEN : Hadii aad ku hadasho Sokyawali, waaxda luqadaha, qaybta kaalmada adeegyada, mikala dalton. So [facility] [phone].    ATENCIÓN: Si habla español, tiene a rodriguez disposición servicios gratuitos de asistencia lingüística. [facility] [phone].    We comply with applicable federal civil rights laws and [place] laws. We do not discriminate on the basis of race, color, national origin, age, disability, sex, sexual orientation, or gender identity.            Thank you!     Thank you for choosing [facility]  for your care. Our goal is always to provide you with excellent care. Hearing back from our patients is one way we can continue to improve our services. Please take a few minutes to complete the written survey that you may receive in the mail after your visit with us. Thank you!             Your Updated Medication List - Protect others around you: Learn how to safely use, store " and throw away your medicines at www.disposemymeds.org.          This list is accurate as of: 1/10/18  7:39 AM.  Always use your most recent med list.                   Brand Name Dispense Instructions for use Diagnosis    albuterol 108 (90 BASE) MCG/ACT Inhaler    PROAIR HFA    2 Inhaler    Inhale 2 puffs into the lungs every 6 hours as needed INHALE 1 TO 2 PUFFS EVERY 4 TO 6 HOURS AS NEEDED    Mild intermittent asthma       amitriptyline 25 MG tablet    ELAVIL    90 tablet    TAKE ONE TABLET BY MOUTH AT BEDTIME    Psychophysiological insomnia       aspirin 81 MG tablet      Take 1 tablet by mouth daily.        atorvastatin 40 MG tablet    LIPITOR    90 tablet    Take 1 tablet (40 mg) by mouth daily    Hyperlipidemia LDL goal <130       fluticasone 50 MCG/ACT spray    FLONASE    48 g    Spray 1-2 sprays into both nostrils daily    Allergic rhinitis       HM VITAMIN D3 4000 UNITS Caps   Generic drug:  Cholecalciferol      Take  by mouth.        hydrOXYzine 25 MG tablet    ATARAX    90 tablet    Take 1-2 tablets (25-50 mg) by mouth every 6 hours as needed for anxiety    Situational anxiety       * methocarbamol 750 MG tablet    ROBAXIN    90 tablet    Take 1 tablet (750 mg) by mouth every 4 hours    Thoracic or lumbosacral neuritis or radiculitis, unspecified       * methocarbamol 500 MG tablet    ROBAXIN    90 tablet    Take 2 tablets (1,000 mg) by mouth 3 times daily as needed for muscle spasms    Muscle spasm       ondansetron 8 MG tablet    ZOFRAN    18 tablet    Take 1 tablet (8 mg) by mouth every 8 hours as needed for nausea    Nausea       * sertraline 100 MG tablet    ZOLOFT    180 tablet    Take 2 tablets (200 mg) by mouth daily    Situational anxiety       * sertraline 100 MG tablet    ZOLOFT    135 tablet    Take 1.5 tablets (150 mg) by mouth daily    Anxiety       solifenacin 5 MG tablet    VESICARE    90 tablet    Take 1 tablet (5 mg) by mouth daily    Overactive bladder       * Notice:  This list has 4  medication(s) that are the same as other medications prescribed for you. Read the directions carefully, and ask your doctor or other care provider to review them with you.

## 2018-01-15 LAB
COPATH REPORT: NORMAL
PAP: NORMAL

## 2018-01-17 LAB
FINAL DIAGNOSIS: NORMAL
HPV HR 12 DNA CVX QL NAA+PROBE: NEGATIVE
HPV16 DNA SPEC QL NAA+PROBE: NEGATIVE
HPV18 DNA SPEC QL NAA+PROBE: NEGATIVE
SPECIMEN DESCRIPTION: NORMAL
SPECIMEN SOURCE CVX/VAG CYTO: NORMAL

## 2018-01-19 ENCOUNTER — TRANSFERRED RECORDS (OUTPATIENT)
Dept: HEALTH INFORMATION MANAGEMENT | Facility: CLINIC | Age: 58
End: 2018-01-19

## 2018-01-30 ENCOUNTER — MYC MEDICAL ADVICE (OUTPATIENT)
Dept: FAMILY MEDICINE | Facility: CLINIC | Age: 58
End: 2018-01-30

## 2018-01-30 DIAGNOSIS — Z79.52 CURRENT USE OF STEROID MEDICATION: Primary | ICD-10-CM

## 2018-01-30 NOTE — TELEPHONE ENCOUNTER
Please advise if Dr. Ratliff at [facility] is to order the labs he recommended  Rafita Barnes RN

## 2018-02-02 ENCOUNTER — TRANSFERRED RECORDS (OUTPATIENT)
Dept: HEALTH INFORMATION MANAGEMENT | Facility: CLINIC | Age: 58
End: 2018-02-02

## 2018-03-05 ENCOUNTER — TRANSFERRED RECORDS (OUTPATIENT)
Dept: HEALTH INFORMATION MANAGEMENT | Facility: CLINIC | Age: 58
End: 2018-03-05

## 2018-03-14 PROBLEM — M54.41 CHRONIC BILATERAL LOW BACK PAIN WITH RIGHT-SIDED SCIATICA: Status: RESOLVED | Noted: 2017-09-28 | Resolved: 2018-03-14

## 2018-03-14 PROBLEM — M53.3 SI (SACROILIAC) JOINT DYSFUNCTION: Status: RESOLVED | Noted: 2017-09-28 | Resolved: 2018-03-14

## 2018-03-14 PROBLEM — G89.29 CHRONIC BILATERAL LOW BACK PAIN WITH RIGHT-SIDED SCIATICA: Status: RESOLVED | Noted: 2017-09-28 | Resolved: 2018-03-14

## 2018-03-14 NOTE — PROGRESS NOTES
Subjective:  HPI                    Objective:  System    Physical Exam    General     ROS    Assessment/Plan:    DISCHARGE REPORT    Progress reporting period is from 9/28/17 to 12/7/17.     SUBJECTIVE  Subjective: Ordered SI belt online and started using it since Sunday. LB feels better with the SI belt. Worked 2 shifts with belt. SI feels off . Pain in is in R buttock and right anterior thigh.    Current Pain level: 7/10   Initial Pain level: 6/10          Patient has failed to return to therapy so current objective findings are unknown.  The subjective and objective information are from the last SOAP note on this patient.    OBJECTIVE  Objective: Trunk AROM flexion 100%, extension 80%. Normal pelvic position today. Resumed some core exercises. Shown proper application of SI belt supine directly on skin. Will wear daily for at least 2 weeks. Going to [employer] next week. Will use scooter to limit walking.       ASSESSMENT/PLAN  Updated problem list and treatment plan: Diagnosis 1: LB and hip pain with mild lumbar stenosis and R anterior pelvic tilt      Pain -  home program  Decreased strength - home program  Impaired muscle performance - home program  Decreased function - home program  STG/LTGs have been met or progress has been made towards goals:  Yes (See Goal flow sheet completed today.)  Assessment of Progress: The patient has not returned to therapy. Current status is unknown.  Self Management Plans:  Patient has been instructed in a home treatment program.    Delilah continues to require the following intervention to meet STG and LTG's: PT intervention is no longer required to meet STG/LTG.  The patient failed to complete scheduled/ordered appointments so current information is unknown.  We will discharge this patient from PT.    Recommendations:      Please refer to the daily flowsheet for treatment today, total treatment time and time spent performing 1:1 timed codes.

## 2018-03-29 ENCOUNTER — OFFICE VISIT (OUTPATIENT)
Dept: FAMILY MEDICINE | Facility: CLINIC | Age: 58
End: 2018-03-29
Payer: COMMERCIAL

## 2018-03-29 VITALS
DIASTOLIC BLOOD PRESSURE: 85 MMHG | HEIGHT: 62 IN | SYSTOLIC BLOOD PRESSURE: 131 MMHG | WEIGHT: 183 LBS | RESPIRATION RATE: 16 BRPM | BODY MASS INDEX: 33.68 KG/M2 | TEMPERATURE: 98.2 F | HEART RATE: 92 BPM

## 2018-03-29 DIAGNOSIS — Z01.818 PREOP GENERAL PHYSICAL EXAM: Primary | ICD-10-CM

## 2018-03-29 LAB
ANION GAP SERPL CALCULATED.3IONS-SCNC: 6 MMOL/L (ref 3–14)
BUN SERPL-MCNC: 14 MG/DL (ref 7–30)
CALCIUM SERPL-MCNC: 9.8 MG/DL (ref 8.5–10.1)
CHLORIDE SERPL-SCNC: 106 MMOL/L (ref 94–109)
CO2 SERPL-SCNC: 28 MMOL/L (ref 20–32)
CREAT SERPL-MCNC: 0.69 MG/DL (ref 0.52–1.04)
ERYTHROCYTE [DISTWIDTH] IN BLOOD BY AUTOMATED COUNT: 13.2 % (ref 10–15)
GFR SERPL CREATININE-BSD FRML MDRD: 87 ML/MIN/1.7M2
GLUCOSE SERPL-MCNC: 125 MG/DL (ref 70–99)
HCT VFR BLD AUTO: 42 % (ref 35–47)
HGB BLD-MCNC: 14.1 G/DL (ref 11.7–15.7)
MCH RBC QN AUTO: 30.3 PG (ref 26.5–33)
MCHC RBC AUTO-ENTMCNC: 33.6 G/DL (ref 31.5–36.5)
MCV RBC AUTO: 90 FL (ref 78–100)
MRSA DNA SPEC QL NAA+PROBE: NEGATIVE
PLATELET # BLD AUTO: 276 10E9/L (ref 150–450)
POTASSIUM SERPL-SCNC: 4 MMOL/L (ref 3.4–5.3)
RBC # BLD AUTO: 4.66 10E12/L (ref 3.8–5.2)
SODIUM SERPL-SCNC: 140 MMOL/L (ref 133–144)
SPECIMEN SOURCE: NORMAL
WBC # BLD AUTO: 8.3 10E9/L (ref 4–11)

## 2018-03-29 PROCEDURE — 36415 COLL VENOUS BLD VENIPUNCTURE: CPT | Performed by: FAMILY MEDICINE

## 2018-03-29 PROCEDURE — 87640 STAPH A DNA AMP PROBE: CPT | Mod: 59 | Performed by: FAMILY MEDICINE

## 2018-03-29 PROCEDURE — 93000 ELECTROCARDIOGRAM COMPLETE: CPT | Performed by: FAMILY MEDICINE

## 2018-03-29 PROCEDURE — 85027 COMPLETE CBC AUTOMATED: CPT | Performed by: FAMILY MEDICINE

## 2018-03-29 PROCEDURE — 80048 BASIC METABOLIC PNL TOTAL CA: CPT | Performed by: FAMILY MEDICINE

## 2018-03-29 PROCEDURE — 87641 MR-STAPH DNA AMP PROBE: CPT | Performed by: FAMILY MEDICINE

## 2018-03-29 PROCEDURE — 99214 OFFICE O/P EST MOD 30 MIN: CPT | Performed by: FAMILY MEDICINE

## 2018-03-29 RX ORDER — IBUPROFEN 800 MG/1
800 TABLET, FILM COATED ORAL EVERY 6 HOURS PRN
COMMUNITY
Start: 2018-02-23 | End: 2020-10-28

## 2018-03-29 RX ORDER — CYCLOBENZAPRINE HCL 5 MG
5 TABLET ORAL 2 TIMES DAILY PRN
COMMUNITY
Start: 2018-03-16 | End: 2018-07-12

## 2018-03-29 RX ORDER — FLUTICASONE PROPIONATE 50 MCG
1 SPRAY, SUSPENSION (ML) NASAL DAILY PRN
COMMUNITY
End: 2018-08-09

## 2018-03-29 NOTE — MR AVS SNAPSHOT
After Visit Summary   3/29/2018    Delilah Miranda    MRN: [id number]           Patient Information     Date Of Birth          1960        Visit Information        Provider Department      3/29/2018 7:15 AM Chelsea Rueda MD [facility]        Today's Diagnoses     Preop general physical exam    -  1      Care Instructions      Before Your Surgery      Call your surgeon if there is any change in your health. This includes signs of a cold or flu (such as a sore throat, runny nose, cough, rash or fever).    Do not smoke, drink alcohol or take over the counter medicine (unless your surgeon or primary care doctor tells you to) for the 24 hours before and after surgery.    If you take prescribed drugs: Follow your doctor s orders about which medicines to take and which to stop until after surgery.    Eating and drinking prior to surgery: follow the instructions from your surgeon    Take a shower or bath the night before surgery. Use the soap your surgeon gave you to gently clean your skin. If you do not have soap from your surgeon, use your regular soap. Do not shave or scrub the surgery site.  Wear clean pajamas and have clean sheets on your bed.           Follow-ups after your visit        Your next 10 appointments already scheduled     Apr 09, 2018   Procedure with Bryon Zaragoza MD   [facility] (--)    [address] [facility] [address] [phone]              Who to contact     If you have questions or need follow up information about today's clinic visit or your schedule please contact [facility] directly at [phone].  Normal or non-critical lab and imaging results will be communicated to you by MyChart, letter or phone within 4 business days after the clinic has received the results. If you do not hear from us within 7 days, please contact the clinic through MyChart or phone. If you have a  "critical or abnormal lab result, we will notify you by phone as soon as possible.  Submit refill requests through [employer] or call your pharmacy and they will forward the refill request to us. Please allow 3 business days for your refill to be completed.          Additional Information About Your Visit        [employer] Information     [employer] gives you secure access to your electronic health record. If you see a primary care provider, you can also send messages to your care team and make appointments. If you have questions, please call your primary care clinic.  If you do not have a primary care provider, please call [phone] and they will assist you.        Care EveryWhere ID     This is your Care EveryWhere ID. This could be used by other organizations to access your [place] medical records  [id number]        Your Vitals Were     Pulse Temperature Respirations Height Last Period Breastfeeding?    92 98.2  F (36.8  C) (Oral) 16 5' 2\" (1.575 m) 04/25/2007 No    BMI (Body Mass Index)                   33.47 kg/m2            Blood Pressure from Last 3 Encounters:   03/29/18 131/85   01/10/18 135/76   12/05/17 120/80    Weight from Last 3 Encounters:   03/29/18 183 lb (83 kg)   01/10/18 185 lb 6.4 oz (84.1 kg)   12/05/17 188 lb 12.8 oz (85.6 kg)              We Performed the Following     Basic metabolic panel     CBC with platelets     EKG 12-lead complete w/read - Clinics     MRSA MSSA Presurgical Screen          Today's Medication Changes          These changes are accurate as of 3/29/18  8:15 AM.  If you have any questions, ask your nurse or doctor.               These medicines have changed or have updated prescriptions.        Dose/Directions    methocarbamol 500 MG tablet   Commonly known as:  ROBAXIN   This may have changed:  Another medication with the same name was removed. Continue taking this medication, and follow the directions you see here.   Used for:  Muscle spasm   Changed by:  Mohit " Chelsea Garber MD        Dose:  1000 mg   Take 2 tablets (1,000 mg) by mouth 3 times daily as needed for muscle spasms   Quantity:  90 tablet   Refills:  1                Primary Care Provider Office Phone # Fax #    Roxana Cuadra PA-C [phone] [address] [facility] [address]        Equal Access to Services     [facility]: Hadii aad ku hadasho Soomaali, waaxda luqadaha, qaybta kaalmada adeegyada, waxay idiin hayaan adeeg khliyash laisaias . So [facility] [phone].    ATENCIÓN: Si habla español, tiene a rodriguez disposición servicios gratuitos de asistencia lingüística. Veraame al [phone].    We comply with applicable federal civil rights laws and [place] laws. We do not discriminate on the basis of race, color, national origin, age, disability, sex, sexual orientation, or gender identity.            Thank you!     Thank you for choosing [facility]  for your care. Our goal is always to provide you with excellent care. Hearing back from our patients is one way we can continue to improve our services. Please take a few minutes to complete the written survey that you may receive in the mail after your visit with us. Thank you!             Your Updated Medication List - Protect others around you: Learn how to safely use, store and throw away your medicines at www.disposemymeds.org.          This list is accurate as of 3/29/18  8:15 AM.  Always use your most recent med list.                   Brand Name Dispense Instructions for use Diagnosis    albuterol 108 (90 BASE) MCG/ACT Inhaler    PROAIR HFA    2 Inhaler    Inhale 2 puffs into the lungs every 6 hours as needed INHALE 1 TO 2 PUFFS EVERY 4 TO 6 HOURS AS NEEDED    Mild intermittent asthma       amitriptyline 25 MG tablet    ELAVIL    90 tablet    TAKE ONE TABLET BY MOUTH AT BEDTIME    Psychophysiological insomnia       aspirin 81 MG tablet      Take 1 tablet by mouth daily.        atorvastatin 40 MG tablet    LIPITOR     90 tablet    Take 1 tablet (40 mg) by mouth daily    Hyperlipidemia LDL goal <130       cyclobenzaprine 5 MG tablet    FLEXERIL     prn        fluticasone 50 MCG/ACT spray    FLONASE    48 g    Spray 1-2 sprays into both nostrils daily    Allergic rhinitis       HM VITAMIN D3 4000 UNITS Caps   Generic drug:  Cholecalciferol      Take  by mouth.        hydrOXYzine 25 MG tablet    ATARAX    90 tablet    Take 1-2 tablets (25-50 mg) by mouth every 6 hours as needed for anxiety    Situational anxiety       ibuprofen 800 MG tablet    ADVIL/MOTRIN     prn        methocarbamol 500 MG tablet    ROBAXIN    90 tablet    Take 2 tablets (1,000 mg) by mouth 3 times daily as needed for muscle spasms    Muscle spasm       ondansetron 8 MG tablet    ZOFRAN    18 tablet    Take 1 tablet (8 mg) by mouth every 8 hours as needed for nausea    Nausea       sertraline 100 MG tablet    ZOLOFT    135 tablet    Take 1.5 tablets (150 mg) by mouth daily    Anxiety       solifenacin 5 MG tablet    VESICARE    90 tablet    Take 1 tablet (5 mg) by mouth daily    Overactive bladder

## 2018-03-29 NOTE — PROGRESS NOTES
[facility]  [address] [facility] [address]  [phone]  Dept: [phone]    PRE-OP EVALUATION:  Today's date: 3/29/2018    Delilah Miranda (: 1960) presents for pre-operative evaluation assessment as requested by Dr. Michael Rubi.  She requires evaluation and anesthesia risk assessment prior to undergoing surgery/procedure for treatment of right total hip arthroplasty     surgeon request choice anesthesia .    Fax number for surgical facility: [phone]  Primary Physician: Roxana Cuadra  Type of Anesthesia Anticipated: General    Patient has a Health Care Directive or Living Will:  NO    Preop Questions 3/29/2018   Who is doing your surgery? dr michael rubi   What are you having done? [facility]   Date of Surgery/Procedure: 18   Facility or Hospital where procedure/surgery will be performed: [facility]   1.  Do you have a history of Heart attack, stroke, stent, coronary bypass surgery, or other heart surgery? No   2.  Do you ever have any pain or discomfort in your chest? No   3.  Do you have a history of  Heart Failure? No   4.   Are you troubled by shortness of breath when:  walking on a level surface, or up a slight hill, or at night? No   5.  Do you currently have a cold, bronchitis or other respiratory infection? No   6.  Do you have a cough, shortness of breath, or wheezing? No   7.  Do you sometimes get pains in the calves of your legs when you walk? No   8. Do you or anyone in your family have previous history of blood clots? No   9.  Do you or does anyone in your family have a serious bleeding problem such as prolonged bleeding following surgeries or cuts? No   10. Have you ever had problems with anemia or been told to take iron pills? No   11. Have you had any abnormal blood loss such as black, tarry or bloody stools, or abnormal vaginal bleeding? No   12. Have you ever had a blood transfusion? No   13. Have you or any of  your relatives ever had problems with anesthesia? No   14. Do you have sleep apnea, excessive snoring or daytime drowsiness? YES - uses CPAP    15. Do you have any prosthetic heart valves? No   16. Do you have prosthetic joints? No   17. Is there any chance that you may be pregnant? No         HPI:     HPI related to upcoming procedure: DJD right hip. Had MRI on 2/26/18 which showed moderated osteoarthritis of right hip with chondral thinning.       See problem list for active medical problems.  Problems all longstanding and stable, except as noted/documented.  See ROS for pertinent symptoms related to these conditions.                                                                                                  .    MEDICAL HISTORY:     Patient Active Problem List    Diagnosis Date Noted     Situational anxiety 05/31/2016     Priority: Medium     Overactive bladder 04/16/2014     Priority: Medium     Anxiety 10/31/2013     Priority: Medium     Heartburn 02/14/2013     Priority: Medium     Snoring 02/14/2013     Priority: Medium     Urinary incontinence, nocturnal enuresis 02/14/2013     Priority: Medium     Elevated fasting glucose 12/07/2012     Priority: Medium     Vitamin D deficiency 12/07/2012     Priority: Medium     Hyperlipidemia LDL goal <160 09/26/2012     Priority: Medium     OA (osteoarthritis) of knee 04/03/2012     Priority: Medium     Mild major depression (H) 04/06/2010     Priority: Medium     Mild intermittent asthma 02/01/2008     Priority: Medium     Allergic rhinitis 10/22/2003     Priority: Medium     Problem list name updated by automated process. Provider to review       Thoracic or lumbosacral neuritis or radiculitis, unspecified 10/22/2003     Priority: Medium      Past Medical History:   Diagnosis Date     Anxiety 10/31/2013     Degeneration of lumbar or lumbosacral intervertebral disc      Mild intermittent asthma      Personal history of tobacco use, presenting hazards to health       Pneumonia      Past Surgical History:   Procedure Laterality Date     C NONSPECIFIC PROCEDURE      meniscus     Current Outpatient Prescriptions   Medication Sig Dispense Refill     amitriptyline (ELAVIL) 25 MG tablet TAKE ONE TABLET BY MOUTH AT BEDTIME 90 tablet 1     hydrOXYzine (ATARAX) 25 MG tablet Take 1-2 tablets (25-50 mg) by mouth every 6 hours as needed for anxiety 90 tablet 1     solifenacin (VESICARE) 5 MG tablet Take 1 tablet (5 mg) by mouth daily 90 tablet 1     sertraline (ZOLOFT) 100 MG tablet Take 1.5 tablets (150 mg) by mouth daily 135 tablet 1     methocarbamol (ROBAXIN) 500 MG tablet Take 2 tablets (1,000 mg) by mouth 3 times daily as needed for muscle spasms 90 tablet 1     atorvastatin (LIPITOR) 40 MG tablet Take 1 tablet (40 mg) by mouth daily 90 tablet 3     ondansetron (ZOFRAN) 8 MG tablet Take 1 tablet (8 mg) by mouth every 8 hours as needed for nausea 18 tablet 0     fluticasone (FLONASE) 50 MCG/ACT nasal spray Spray 1-2 sprays into both nostrils daily 48 g 1     methocarbamol (ROBAXIN) 750 MG tablet Take 1 tablet (750 mg) by mouth every 4 hours 90 tablet 2     albuterol (ALBUTEROL) 108 (90 BASE) MCG/ACT inhaler Inhale 2 puffs into the lungs every 6 hours as needed INHALE 1 TO 2 PUFFS EVERY 4 TO 6 HOURS AS NEEDED 2 Inhaler 3     Cholecalciferol (HM VITAMIN D3) 4000 UNITS CAPS Take  by mouth.       aspirin 81 MG tablet Take 1 tablet by mouth daily.  3     OTC products: NSAIDS    Allergies   Allergen Reactions     No Known Drug Allergies       Latex Allergy: NO    Social History   Substance Use Topics     Smoking status: Former Smoker     Smokeless tobacco: Never Used      Comment: smoked 1 pk qd in her twenties quit at age 26     Alcohol use 0.0 oz/week     0 Standard drinks or equivalent per week      Comment: social-rarely     History   Drug Use No       REVIEW OF SYSTEMS:   Constitutional, neuro, ENT, endocrine, pulmonary, cardiac, gastrointestinal, genitourinary, musculoskeletal,  "integument and psychiatric systems are negative, except as otherwise noted.    EXAM:   /85 (BP Location: Left arm, Patient Position: Chair, Cuff Size: Adult Large)  Pulse 92  Temp 98.2  F (36.8  C) (Oral)  Resp 16  Ht 5' 2\" (1.575 m)  Wt 183 lb (83 kg)  LMP 04/25/2007  Breastfeeding? No  BMI 33.47 kg/m2    GENERAL APPEARANCE: healthy, alert and no distress     EYES: EOMI, PERRL     HENT: ear canals and TM's normal and nose and mouth without ulcers or lesions     NECK: no adenopathy, no asymmetry, masses, or scars and thyroid normal to palpation     RESP: lungs clear to auscultation - no rales, rhonchi or wheezes     CV: regular rates and rhythm, normal S1 S2     ABDOMEN:  soft, nontender, no HSM or masses and bowel sounds normal     MS: ambulates with limp, no edema      SKIN: no suspicious lesions or rashes     NEURO: Normal strength and tone, sensory exam grossly normal, mentation intact and speech normal     PSYCH: mentation appears normal. and affect normal/bright    DIAGNOSTICS:   EKG: appears normal, NSR,  no acute ST/T changes c/w ischemia    Recent Labs   Lab Test  01/10/18   0800  12/05/17   0809  11/11/16   0802  04/16/14   0838   03/27/13   0816   HGB   --    --    --   14.1   --   13.6   PLT   --    --    --   257   --    --    NA   --   141   --   147*   --    --    POTASSIUM   --   3.6   --   4.9   --    --    CR   --   0.63   --   0.71   --    --    A1C  6.0   --   5.8   --    < >   --     < > = values in this interval not displayed.        IMPRESSION:   Reason for surgery/procedure:  DJD hip/ right hip arthroplasty   Diagnosis/reason for consult: Pre-operative consult     The proposed surgical procedure is considered INTERMEDIATE risk.    REVISED CARDIAC RISK INDEX  The patient has the following serious cardiovascular risks for perioperative complications such as (MI, PE, VFib and 3  AV Block):  No serious cardiac risks  INTERPRETATION: 1 risks: Class II (low risk - 0.9% complication " rate)    The patient has the following additional risks for perioperative complications:  No identified additional risks      ICD-10-CM    1. Preop general physical exam Z01.818        RECOMMENDATIONS:       Obstructive Sleep Apnea (or suspected sleep apnea)  Patient is to bring their home CPAP with them on the day of surgery      --Patient is to take all scheduled medications on the day of surgery EXCEPT for modifications listed below.    Anticoagulant or Antiplatelet Medication Use  ASPIRIN: Discontinue ASA 7-10 days prior to procedure to reduce bleeding risk.  It should be resumed post-operatively.        APPROVAL GIVEN to proceed with proposed procedure, without further diagnostic evaluation       Signed Electronically by: Chelsea Rueda MD    Copy of this evaluation report is provided to requesting physician.    [place] Preop Guidelines

## 2018-04-03 NOTE — PHARMACY-ADMISSION MEDICATION HISTORY
Pharmacy reviewed prior to admission med list from pre-admitting [email or web address].      Prior to Admission medications    Medication Sig Last Dose Taking? Auth Provider   cyclobenzaprine (FLEXERIL) 5 MG tablet Take 5 mg by mouth 2 times daily as needed   Yes Reported, Patient   ibuprofen (ADVIL/MOTRIN) 800 MG tablet 800 mg every 6 hours as needed (Will stop 7 days pre op) prn  Yes Reported, Patient   fluticasone (FLONASE) 50 MCG/ACT spray Spray 1 spray into both nostrils daily as needed for rhinitis or allergies  Yes Reported, Patient   Sertraline HCl (ZOLOFT PO) Take 150 mg by mouth daily  Yes Reported, Patient   amitriptyline (ELAVIL) 25 MG tablet TAKE ONE TABLET BY MOUTH AT BEDTIME  Yes Roxana Cuadra PA-C   hydrOXYzine (ATARAX) 25 MG tablet Take 1-2 tablets (25-50 mg) by mouth every 6 hours as needed for anxiety  Yes Roxana Cuadra PA-C   solifenacin (VESICARE) 5 MG tablet Take 1 tablet (5 mg) by mouth daily  Yes Roxana Cuadra PA-C   methocarbamol (ROBAXIN) 500 MG tablet Take 2 tablets (1,000 mg) by mouth 3 times daily as needed for muscle spasms  Yes Roxana Cuadra PA-C   atorvastatin (LIPITOR) 40 MG tablet Take 1 tablet (40 mg) by mouth daily  Yes Roxana Cuadra PA-C   albuterol (ALBUTEROL) 108 (90 BASE) MCG/ACT inhaler Inhale 2 puffs into the lungs every 6 hours as needed INHALE 1 TO 2 PUFFS EVERY 4 TO 6 HOURS AS NEEDED  Yes Roxana Cuadra PA-C   Cholecalciferol (HM VITAMIN D3) 4000 UNITS CAPS Take 1 tablet by mouth daily   Yes Reported, Patient   aspirin 81 MG tablet Take 1 tablet by mouth daily Pt instructed by MD to sop 1 week prior to surgery  Yes Scott Murphy MD

## 2018-04-04 RX ORDER — PERPHENAZINE 16 MG
1 TABLET ORAL DAILY
COMMUNITY

## 2018-04-04 RX ORDER — GLUCOSAMINE/D3/BOSWELLIA SERRA 1500MG-400
1 TABLET ORAL DAILY
COMMUNITY

## 2018-04-04 NOTE — H&P (VIEW-ONLY)
[facility]  [address] [facility] [address]  [phone]  Dept: [phone]    PRE-OP EVALUATION:  Today's date: 3/29/2018    Delilah Miranda (: 1960) presents for pre-operative evaluation assessment as requested by Dr. Michael Rubi.  She requires evaluation and anesthesia risk assessment prior to undergoing surgery/procedure for treatment of right total hip arthroplasty     surgeon request choice anesthesia .    Fax number for surgical facility: [phone]  Primary Physician: Roxana Cuadra  Type of Anesthesia Anticipated: General    Patient has a Health Care Directive or Living Will:  NO    Preop Questions 3/29/2018   Who is doing your surgery? dr michael rubi   What are you having done? [facility]   Date of Surgery/Procedure: 18   Facility or Hospital where procedure/surgery will be performed: [facility]   1.  Do you have a history of Heart attack, stroke, stent, coronary bypass surgery, or other heart surgery? No   2.  Do you ever have any pain or discomfort in your chest? No   3.  Do you have a history of  Heart Failure? No   4.   Are you troubled by shortness of breath when:  walking on a level surface, or up a slight hill, or at night? No   5.  Do you currently have a cold, bronchitis or other respiratory infection? No   6.  Do you have a cough, shortness of breath, or wheezing? No   7.  Do you sometimes get pains in the calves of your legs when you walk? No   8. Do you or anyone in your family have previous history of blood clots? No   9.  Do you or does anyone in your family have a serious bleeding problem such as prolonged bleeding following surgeries or cuts? No   10. Have you ever had problems with anemia or been told to take iron pills? No   11. Have you had any abnormal blood loss such as black, tarry or bloody stools, or abnormal vaginal bleeding? No   12. Have you ever had a blood transfusion? No   13. Have you or any of  your relatives ever had problems with anesthesia? No   14. Do you have sleep apnea, excessive snoring or daytime drowsiness? YES - uses CPAP    15. Do you have any prosthetic heart valves? No   16. Do you have prosthetic joints? No   17. Is there any chance that you may be pregnant? No         HPI:     HPI related to upcoming procedure: DJD right hip. Had MRI on 2/26/18 which showed moderated osteoarthritis of right hip with chondral thinning.       See problem list for active medical problems.  Problems all longstanding and stable, except as noted/documented.  See ROS for pertinent symptoms related to these conditions.                                                                                                  .    MEDICAL HISTORY:     Patient Active Problem List    Diagnosis Date Noted     Situational anxiety 05/31/2016     Priority: Medium     Overactive bladder 04/16/2014     Priority: Medium     Anxiety 10/31/2013     Priority: Medium     Heartburn 02/14/2013     Priority: Medium     Snoring 02/14/2013     Priority: Medium     Urinary incontinence, nocturnal enuresis 02/14/2013     Priority: Medium     Elevated fasting glucose 12/07/2012     Priority: Medium     Vitamin D deficiency 12/07/2012     Priority: Medium     Hyperlipidemia LDL goal <160 09/26/2012     Priority: Medium     OA (osteoarthritis) of knee 04/03/2012     Priority: Medium     Mild major depression (H) 04/06/2010     Priority: Medium     Mild intermittent asthma 02/01/2008     Priority: Medium     Allergic rhinitis 10/22/2003     Priority: Medium     Problem list name updated by automated process. Provider to review       Thoracic or lumbosacral neuritis or radiculitis, unspecified 10/22/2003     Priority: Medium      Past Medical History:   Diagnosis Date     Anxiety 10/31/2013     Degeneration of lumbar or lumbosacral intervertebral disc      Mild intermittent asthma      Personal history of tobacco use, presenting hazards to health       Pneumonia      Past Surgical History:   Procedure Laterality Date     C NONSPECIFIC PROCEDURE      meniscus     Current Outpatient Prescriptions   Medication Sig Dispense Refill     amitriptyline (ELAVIL) 25 MG tablet TAKE ONE TABLET BY MOUTH AT BEDTIME 90 tablet 1     hydrOXYzine (ATARAX) 25 MG tablet Take 1-2 tablets (25-50 mg) by mouth every 6 hours as needed for anxiety 90 tablet 1     solifenacin (VESICARE) 5 MG tablet Take 1 tablet (5 mg) by mouth daily 90 tablet 1     sertraline (ZOLOFT) 100 MG tablet Take 1.5 tablets (150 mg) by mouth daily 135 tablet 1     methocarbamol (ROBAXIN) 500 MG tablet Take 2 tablets (1,000 mg) by mouth 3 times daily as needed for muscle spasms 90 tablet 1     atorvastatin (LIPITOR) 40 MG tablet Take 1 tablet (40 mg) by mouth daily 90 tablet 3     ondansetron (ZOFRAN) 8 MG tablet Take 1 tablet (8 mg) by mouth every 8 hours as needed for nausea 18 tablet 0     fluticasone (FLONASE) 50 MCG/ACT nasal spray Spray 1-2 sprays into both nostrils daily 48 g 1     methocarbamol (ROBAXIN) 750 MG tablet Take 1 tablet (750 mg) by mouth every 4 hours 90 tablet 2     albuterol (ALBUTEROL) 108 (90 BASE) MCG/ACT inhaler Inhale 2 puffs into the lungs every 6 hours as needed INHALE 1 TO 2 PUFFS EVERY 4 TO 6 HOURS AS NEEDED 2 Inhaler 3     Cholecalciferol (HM VITAMIN D3) 4000 UNITS CAPS Take  by mouth.       aspirin 81 MG tablet Take 1 tablet by mouth daily.  3     OTC products: NSAIDS    Allergies   Allergen Reactions     No Known Drug Allergies       Latex Allergy: NO    Social History   Substance Use Topics     Smoking status: Former Smoker     Smokeless tobacco: Never Used      Comment: smoked 1 pk qd in her twenties quit at age 26     Alcohol use 0.0 oz/week     0 Standard drinks or equivalent per week      Comment: social-rarely     History   Drug Use No       REVIEW OF SYSTEMS:   Constitutional, neuro, ENT, endocrine, pulmonary, cardiac, gastrointestinal, genitourinary, musculoskeletal,  "integument and psychiatric systems are negative, except as otherwise noted.    EXAM:   /85 (BP Location: Left arm, Patient Position: Chair, Cuff Size: Adult Large)  Pulse 92  Temp 98.2  F (36.8  C) (Oral)  Resp 16  Ht 5' 2\" (1.575 m)  Wt 183 lb (83 kg)  LMP 04/25/2007  Breastfeeding? No  BMI 33.47 kg/m2    GENERAL APPEARANCE: healthy, alert and no distress     EYES: EOMI, PERRL     HENT: ear canals and TM's normal and nose and mouth without ulcers or lesions     NECK: no adenopathy, no asymmetry, masses, or scars and thyroid normal to palpation     RESP: lungs clear to auscultation - no rales, rhonchi or wheezes     CV: regular rates and rhythm, normal S1 S2     ABDOMEN:  soft, nontender, no HSM or masses and bowel sounds normal     MS: ambulates with limp, no edema      SKIN: no suspicious lesions or rashes     NEURO: Normal strength and tone, sensory exam grossly normal, mentation intact and speech normal     PSYCH: mentation appears normal. and affect normal/bright    DIAGNOSTICS:   EKG: appears normal, NSR,  no acute ST/T changes c/w ischemia    Recent Labs   Lab Test  01/10/18   0800  12/05/17   0809  11/11/16   0802  04/16/14   0838   03/27/13   0816   HGB   --    --    --   14.1   --   13.6   PLT   --    --    --   257   --    --    NA   --   141   --   147*   --    --    POTASSIUM   --   3.6   --   4.9   --    --    CR   --   0.63   --   0.71   --    --    A1C  6.0   --   5.8   --    < >   --     < > = values in this interval not displayed.        IMPRESSION:   Reason for surgery/procedure:  DJD hip/ right hip arthroplasty   Diagnosis/reason for consult: Pre-operative consult     The proposed surgical procedure is considered INTERMEDIATE risk.    REVISED CARDIAC RISK INDEX  The patient has the following serious cardiovascular risks for perioperative complications such as (MI, PE, VFib and 3  AV Block):  No serious cardiac risks  INTERPRETATION: 1 risks: Class II (low risk - 0.9% complication " rate)    The patient has the following additional risks for perioperative complications:  No identified additional risks      ICD-10-CM    1. Preop general physical exam Z01.818        RECOMMENDATIONS:       Obstructive Sleep Apnea (or suspected sleep apnea)  Patient is to bring their home CPAP with them on the day of surgery      --Patient is to take all scheduled medications on the day of surgery EXCEPT for modifications listed below.    Anticoagulant or Antiplatelet Medication Use  ASPIRIN: Discontinue ASA 7-10 days prior to procedure to reduce bleeding risk.  It should be resumed post-operatively.        APPROVAL GIVEN to proceed with proposed procedure, without further diagnostic evaluation       Signed Electronically by: Chelsea Rueda MD    Copy of this evaluation report is provided to requesting physician.    [facility] Preop Guidelines

## 2018-04-09 ENCOUNTER — APPOINTMENT (OUTPATIENT)
Dept: GENERAL RADIOLOGY | Facility: CLINIC | Age: 58
DRG: 470 | End: 2018-04-09
Attending: ORTHOPAEDIC SURGERY
Payer: COMMERCIAL

## 2018-04-09 ENCOUNTER — HOSPITAL ENCOUNTER (INPATIENT)
Facility: CLINIC | Age: 58
LOS: 3 days | Discharge: HOME | DRG: 470 | End: 2018-04-12
Attending: ORTHOPAEDIC SURGERY | Admitting: ORTHOPAEDIC SURGERY
Payer: COMMERCIAL

## 2018-04-09 ENCOUNTER — ANESTHESIA (OUTPATIENT)
Dept: SURGERY | Facility: CLINIC | Age: 58
DRG: 470 | End: 2018-04-09
Payer: COMMERCIAL

## 2018-04-09 ENCOUNTER — ANESTHESIA EVENT (OUTPATIENT)
Dept: SURGERY | Facility: CLINIC | Age: 58
DRG: 470 | End: 2018-04-09
Payer: COMMERCIAL

## 2018-04-09 DIAGNOSIS — Z96.641 STATUS POST TOTAL REPLACEMENT OF RIGHT HIP: Primary | ICD-10-CM

## 2018-04-09 PROBLEM — Z96.649 S/P TOTAL HIP ARTHROPLASTY: Status: ACTIVE | Noted: 2018-04-09

## 2018-04-09 PROCEDURE — 71000012 ZZH RECOVERY PHASE 1 LEVEL 1 FIRST HR: Performed by: ORTHOPAEDIC SURGERY

## 2018-04-09 PROCEDURE — 25000128 H RX IP 250 OP 636: Performed by: ANESTHESIOLOGY

## 2018-04-09 PROCEDURE — 25000128 H RX IP 250 OP 636: Performed by: PHYSICIAN ASSISTANT

## 2018-04-09 PROCEDURE — 40000985 XR PELVIS AD HIP PORTABLE RIGHT 1 VIEW

## 2018-04-09 PROCEDURE — 25000125 ZZHC RX 250

## 2018-04-09 PROCEDURE — C1713 ANCHOR/SCREW BN/BN,TIS/BN: HCPCS | Performed by: ORTHOPAEDIC SURGERY

## 2018-04-09 PROCEDURE — 12000007 ZZH R&B INTERMEDIATE

## 2018-04-09 PROCEDURE — 99207 ZZC NON-BILLABLE SERV PER CHARTING: CPT | Performed by: INTERNAL MEDICINE

## 2018-04-09 PROCEDURE — 25000125 ZZHC RX 250: Performed by: PHYSICIAN ASSISTANT

## 2018-04-09 PROCEDURE — 27210794 ZZH OR GENERAL SUPPLY STERILE: Performed by: ORTHOPAEDIC SURGERY

## 2018-04-09 PROCEDURE — 36000063 ZZH SURGERY LEVEL 4 EA 15 ADDTL MIN: Performed by: ORTHOPAEDIC SURGERY

## 2018-04-09 PROCEDURE — 40000306 ZZH STATISTIC PRE PROC ASSESS II: Performed by: ORTHOPAEDIC SURGERY

## 2018-04-09 PROCEDURE — 25000128 H RX IP 250 OP 636

## 2018-04-09 PROCEDURE — 25000132 ZZH RX MED GY IP 250 OP 250 PS 637: Performed by: ORTHOPAEDIC SURGERY

## 2018-04-09 PROCEDURE — 37000008 ZZH ANESTHESIA TECHNICAL FEE, 1ST 30 MIN: Performed by: ORTHOPAEDIC SURGERY

## 2018-04-09 PROCEDURE — 25800025 ZZH RX 258: Performed by: ORTHOPAEDIC SURGERY

## 2018-04-09 PROCEDURE — 71000013 ZZH RECOVERY PHASE 1 LEVEL 1 EA ADDTL HR: Performed by: ORTHOPAEDIC SURGERY

## 2018-04-09 PROCEDURE — C1776 JOINT DEVICE (IMPLANTABLE): HCPCS | Performed by: ORTHOPAEDIC SURGERY

## 2018-04-09 PROCEDURE — 25000132 ZZH RX MED GY IP 250 OP 250 PS 637: Performed by: PHYSICIAN ASSISTANT

## 2018-04-09 PROCEDURE — 40000985 XR HIP PORT RT 1 VW: Mod: TC

## 2018-04-09 PROCEDURE — 36000093 ZZH SURGERY LEVEL 4 1ST 30 MIN: Performed by: ORTHOPAEDIC SURGERY

## 2018-04-09 PROCEDURE — 25000131 ZZH RX MED GY IP 250 OP 636 PS 637

## 2018-04-09 PROCEDURE — 25000128 H RX IP 250 OP 636: Performed by: ORTHOPAEDIC SURGERY

## 2018-04-09 PROCEDURE — 0SR9019 REPLACEMENT OF RIGHT HIP JOINT WITH METAL SYNTHETIC SUBSTITUTE, CEMENTED, OPEN APPROACH: ICD-10-PCS | Performed by: ORTHOPAEDIC SURGERY

## 2018-04-09 PROCEDURE — 37000009 ZZH ANESTHESIA TECHNICAL FEE, EACH ADDTL 15 MIN: Performed by: ORTHOPAEDIC SURGERY

## 2018-04-09 PROCEDURE — 25000566 ZZH SEVOFLURANE, EA 15 MIN: Performed by: ORTHOPAEDIC SURGERY

## 2018-04-09 DEVICE — IMPLANTABLE DEVICE: Type: IMPLANTABLE DEVICE | Site: HIP | Status: FUNCTIONAL

## 2018-04-09 DEVICE — IMP SCR BIOM G7 ACETABULAR DOME 6.5X20MM LOW PRO 010000997: Type: IMPLANTABLE DEVICE | Site: HIP | Status: FUNCTIONAL

## 2018-04-09 DEVICE — IMP LINER BIOM G7 ACET NEU ARCOMXL CRSLNK 36MM SZ E 10000740: Type: IMPLANTABLE DEVICE | Site: HIP | Status: FUNCTIONAL

## 2018-04-09 DEVICE — IMP SHELL BIOM G7 ACETAB PPS LIM HOLE 52MM SZ E 010000663: Type: IMPLANTABLE DEVICE | Site: HIP | Status: FUNCTIONAL

## 2018-04-09 RX ORDER — AMOXICILLIN 250 MG
1 CAPSULE ORAL 2 TIMES DAILY
Status: DISCONTINUED | OUTPATIENT
Start: 2018-04-09 | End: 2018-04-12 | Stop reason: HOSPADM

## 2018-04-09 RX ORDER — HYDROXYZINE HYDROCHLORIDE 25 MG/1
25 TABLET, FILM COATED ORAL 3 TIMES DAILY PRN
Status: DISCONTINUED | OUTPATIENT
Start: 2018-04-09 | End: 2018-04-10

## 2018-04-09 RX ORDER — GLYCOPYRROLATE 0.2 MG/ML
INJECTION, SOLUTION INTRAMUSCULAR; INTRAVENOUS PRN
Status: DISCONTINUED | OUTPATIENT
Start: 2018-04-09 | End: 2018-04-09

## 2018-04-09 RX ORDER — LABETALOL HYDROCHLORIDE 5 MG/ML
10 INJECTION, SOLUTION INTRAVENOUS
Status: DISCONTINUED | OUTPATIENT
Start: 2018-04-09 | End: 2018-04-09 | Stop reason: HOSPADM

## 2018-04-09 RX ORDER — TOLTERODINE 2 MG/1
2 CAPSULE, EXTENDED RELEASE ORAL DAILY
Status: DISCONTINUED | OUTPATIENT
Start: 2018-04-09 | End: 2018-04-12 | Stop reason: HOSPADM

## 2018-04-09 RX ORDER — ONDANSETRON 2 MG/ML
4 INJECTION INTRAMUSCULAR; INTRAVENOUS EVERY 6 HOURS
Status: COMPLETED | OUTPATIENT
Start: 2018-04-09 | End: 2018-04-10

## 2018-04-09 RX ORDER — ZOLPIDEM TARTRATE 5 MG/1
5 TABLET ORAL
Status: DISCONTINUED | OUTPATIENT
Start: 2018-04-10 | End: 2018-04-12 | Stop reason: HOSPADM

## 2018-04-09 RX ORDER — LIDOCAINE 40 MG/G
CREAM TOPICAL
Status: DISCONTINUED | OUTPATIENT
Start: 2018-04-09 | End: 2018-04-09 | Stop reason: HOSPADM

## 2018-04-09 RX ORDER — ONDANSETRON 2 MG/ML
INJECTION INTRAMUSCULAR; INTRAVENOUS PRN
Status: DISCONTINUED | OUTPATIENT
Start: 2018-04-09 | End: 2018-04-09

## 2018-04-09 RX ORDER — OXYCODONE HYDROCHLORIDE 5 MG/1
5-10 TABLET ORAL
Status: DISCONTINUED | OUTPATIENT
Start: 2018-04-09 | End: 2018-04-12 | Stop reason: HOSPADM

## 2018-04-09 RX ORDER — PROPOFOL 10 MG/ML
INJECTION, EMULSION INTRAVENOUS PRN
Status: DISCONTINUED | OUTPATIENT
Start: 2018-04-09 | End: 2018-04-09

## 2018-04-09 RX ORDER — FENTANYL CITRATE 50 UG/ML
INJECTION, SOLUTION INTRAMUSCULAR; INTRAVENOUS PRN
Status: DISCONTINUED | OUTPATIENT
Start: 2018-04-09 | End: 2018-04-09

## 2018-04-09 RX ORDER — PANTOPRAZOLE SODIUM 40 MG/1
40 TABLET, DELAYED RELEASE ORAL ONCE
Status: DISCONTINUED | OUTPATIENT
Start: 2018-04-09 | End: 2018-04-09 | Stop reason: HOSPADM

## 2018-04-09 RX ORDER — NALOXONE HYDROCHLORIDE 0.4 MG/ML
.1-.4 INJECTION, SOLUTION INTRAMUSCULAR; INTRAVENOUS; SUBCUTANEOUS
Status: DISCONTINUED | OUTPATIENT
Start: 2018-04-09 | End: 2018-04-12 | Stop reason: HOSPADM

## 2018-04-09 RX ORDER — SODIUM CHLORIDE 9 MG/ML
INJECTION, SOLUTION INTRAVENOUS CONTINUOUS
Status: DISCONTINUED | OUTPATIENT
Start: 2018-04-09 | End: 2018-04-10 | Stop reason: CLARIF

## 2018-04-09 RX ORDER — ACETAMINOPHEN 325 MG/1
975 TABLET ORAL EVERY 8 HOURS
Status: COMPLETED | OUTPATIENT
Start: 2018-04-09 | End: 2018-04-12

## 2018-04-09 RX ORDER — ATORVASTATIN CALCIUM 40 MG/1
40 TABLET, FILM COATED ORAL DAILY
Status: DISCONTINUED | OUTPATIENT
Start: 2018-04-09 | End: 2018-04-12 | Stop reason: HOSPADM

## 2018-04-09 RX ORDER — HYDROXYZINE HYDROCHLORIDE 25 MG/1
25 TABLET, FILM COATED ORAL EVERY 6 HOURS PRN
Status: DISCONTINUED | OUTPATIENT
Start: 2018-04-09 | End: 2018-04-12 | Stop reason: HOSPADM

## 2018-04-09 RX ORDER — CYCLOBENZAPRINE HCL 5 MG
5 TABLET ORAL 2 TIMES DAILY PRN
Status: DISCONTINUED | OUTPATIENT
Start: 2018-04-09 | End: 2018-04-12 | Stop reason: HOSPADM

## 2018-04-09 RX ORDER — ONDANSETRON 2 MG/ML
4 INJECTION INTRAMUSCULAR; INTRAVENOUS EVERY 30 MIN PRN
Status: DISCONTINUED | OUTPATIENT
Start: 2018-04-09 | End: 2018-04-09 | Stop reason: HOSPADM

## 2018-04-09 RX ORDER — METHOCARBAMOL 500 MG/1
1000 TABLET, FILM COATED ORAL 3 TIMES DAILY PRN
Status: DISCONTINUED | OUTPATIENT
Start: 2018-04-09 | End: 2018-04-12 | Stop reason: HOSPADM

## 2018-04-09 RX ORDER — LIDOCAINE 40 MG/G
CREAM TOPICAL
Status: DISCONTINUED | OUTPATIENT
Start: 2018-04-09 | End: 2018-04-12 | Stop reason: HOSPADM

## 2018-04-09 RX ORDER — CEFAZOLIN SODIUM 1 G/3ML
1 INJECTION, POWDER, FOR SOLUTION INTRAMUSCULAR; INTRAVENOUS SEE ADMIN INSTRUCTIONS
Status: DISCONTINUED | OUTPATIENT
Start: 2018-04-09 | End: 2018-04-09 | Stop reason: HOSPADM

## 2018-04-09 RX ORDER — CELECOXIB 200 MG/1
200 CAPSULE ORAL 2 TIMES DAILY
Status: COMPLETED | OUTPATIENT
Start: 2018-04-09 | End: 2018-04-11

## 2018-04-09 RX ORDER — FENTANYL CITRATE 50 UG/ML
25-50 INJECTION, SOLUTION INTRAMUSCULAR; INTRAVENOUS
Status: DISCONTINUED | OUTPATIENT
Start: 2018-04-09 | End: 2018-04-09 | Stop reason: HOSPADM

## 2018-04-09 RX ORDER — HYDROMORPHONE HYDROCHLORIDE 1 MG/ML
.3-.5 INJECTION, SOLUTION INTRAMUSCULAR; INTRAVENOUS; SUBCUTANEOUS
Status: DISCONTINUED | OUTPATIENT
Start: 2018-04-09 | End: 2018-04-12 | Stop reason: HOSPADM

## 2018-04-09 RX ORDER — PERPHENAZINE 16 MG
1 TABLET ORAL DAILY
Status: DISCONTINUED | OUTPATIENT
Start: 2018-04-09 | End: 2018-04-09 | Stop reason: RX

## 2018-04-09 RX ORDER — PROCHLORPERAZINE MALEATE 10 MG
10 TABLET ORAL EVERY 6 HOURS PRN
Status: DISCONTINUED | OUTPATIENT
Start: 2018-04-09 | End: 2018-04-12 | Stop reason: HOSPADM

## 2018-04-09 RX ORDER — ONDANSETRON 4 MG/1
4 TABLET, ORALLY DISINTEGRATING ORAL EVERY 6 HOURS PRN
Status: DISCONTINUED | OUTPATIENT
Start: 2018-04-09 | End: 2018-04-12 | Stop reason: HOSPADM

## 2018-04-09 RX ORDER — ONDANSETRON 4 MG/1
4 TABLET, ORALLY DISINTEGRATING ORAL EVERY 30 MIN PRN
Status: DISCONTINUED | OUTPATIENT
Start: 2018-04-09 | End: 2018-04-09 | Stop reason: HOSPADM

## 2018-04-09 RX ORDER — ONDANSETRON 2 MG/ML
4 INJECTION INTRAMUSCULAR; INTRAVENOUS EVERY 6 HOURS PRN
Status: DISCONTINUED | OUTPATIENT
Start: 2018-04-09 | End: 2018-04-12 | Stop reason: HOSPADM

## 2018-04-09 RX ORDER — ACETAMINOPHEN 325 MG/1
650 TABLET ORAL EVERY 4 HOURS PRN
Status: DISCONTINUED | OUTPATIENT
Start: 2018-04-12 | End: 2018-04-12 | Stop reason: HOSPADM

## 2018-04-09 RX ORDER — FLUTICASONE PROPIONATE 50 MCG
1 SPRAY, SUSPENSION (ML) NASAL DAILY PRN
Status: DISCONTINUED | OUTPATIENT
Start: 2018-04-09 | End: 2018-04-12 | Stop reason: HOSPADM

## 2018-04-09 RX ORDER — CEFAZOLIN SODIUM 2 G/100ML
2 INJECTION, SOLUTION INTRAVENOUS
Status: COMPLETED | OUTPATIENT
Start: 2018-04-09 | End: 2018-04-09

## 2018-04-09 RX ORDER — HYDROMORPHONE HYDROCHLORIDE 1 MG/ML
.3-.5 INJECTION, SOLUTION INTRAMUSCULAR; INTRAVENOUS; SUBCUTANEOUS EVERY 5 MIN PRN
Status: DISCONTINUED | OUTPATIENT
Start: 2018-04-09 | End: 2018-04-09 | Stop reason: HOSPADM

## 2018-04-09 RX ORDER — MAGNESIUM OXIDE 400 MG/1
400 TABLET ORAL DAILY
Status: DISCONTINUED | OUTPATIENT
Start: 2018-04-09 | End: 2018-04-12 | Stop reason: HOSPADM

## 2018-04-09 RX ORDER — ALBUTEROL SULFATE 90 UG/1
2 AEROSOL, METERED RESPIRATORY (INHALATION) EVERY 6 HOURS PRN
Status: DISCONTINUED | OUTPATIENT
Start: 2018-04-09 | End: 2018-04-12 | Stop reason: HOSPADM

## 2018-04-09 RX ORDER — LABETALOL HYDROCHLORIDE 5 MG/ML
INJECTION, SOLUTION INTRAVENOUS PRN
Status: DISCONTINUED | OUTPATIENT
Start: 2018-04-09 | End: 2018-04-09

## 2018-04-09 RX ORDER — ACETAMINOPHEN 325 MG/1
975 TABLET ORAL EVERY 8 HOURS
Status: DISCONTINUED | OUTPATIENT
Start: 2018-04-09 | End: 2018-04-09 | Stop reason: ALTCHOICE

## 2018-04-09 RX ORDER — SODIUM CHLORIDE, SODIUM LACTATE, POTASSIUM CHLORIDE, CALCIUM CHLORIDE 600; 310; 30; 20 MG/100ML; MG/100ML; MG/100ML; MG/100ML
INJECTION, SOLUTION INTRAVENOUS CONTINUOUS
Status: DISCONTINUED | OUTPATIENT
Start: 2018-04-09 | End: 2018-04-09 | Stop reason: HOSPADM

## 2018-04-09 RX ORDER — CELECOXIB 200 MG/1
400 CAPSULE ORAL ONCE
Status: COMPLETED | OUTPATIENT
Start: 2018-04-09 | End: 2018-04-09

## 2018-04-09 RX ORDER — CEFAZOLIN SODIUM 2 G/100ML
2 INJECTION, SOLUTION INTRAVENOUS EVERY 8 HOURS
Status: COMPLETED | OUTPATIENT
Start: 2018-04-09 | End: 2018-04-10

## 2018-04-09 RX ORDER — GLYCINE 1.5 G/100ML
SOLUTION IRRIGATION PRN
Status: DISCONTINUED | OUTPATIENT
Start: 2018-04-09 | End: 2018-04-09 | Stop reason: HOSPADM

## 2018-04-09 RX ORDER — GLUCOSAMINE/D3/BOSWELLIA SERRA 1500MG-400
1 TABLET ORAL DAILY
Status: DISCONTINUED | OUTPATIENT
Start: 2018-04-09 | End: 2018-04-09 | Stop reason: RX

## 2018-04-09 RX ORDER — AMOXICILLIN 250 MG
2 CAPSULE ORAL 2 TIMES DAILY
Status: DISCONTINUED | OUTPATIENT
Start: 2018-04-09 | End: 2018-04-12 | Stop reason: HOSPADM

## 2018-04-09 RX ORDER — LIDOCAINE HYDROCHLORIDE 10 MG/ML
INJECTION, SOLUTION INFILTRATION; PERINEURAL PRN
Status: DISCONTINUED | OUTPATIENT
Start: 2018-04-09 | End: 2018-04-09

## 2018-04-09 RX ORDER — NALOXONE HYDROCHLORIDE 0.4 MG/ML
.1-.4 INJECTION, SOLUTION INTRAMUSCULAR; INTRAVENOUS; SUBCUTANEOUS
Status: ACTIVE | OUTPATIENT
Start: 2018-04-09 | End: 2018-04-10

## 2018-04-09 RX ORDER — HYDRALAZINE HYDROCHLORIDE 20 MG/ML
2.5-5 INJECTION INTRAMUSCULAR; INTRAVENOUS EVERY 10 MIN PRN
Status: DISCONTINUED | OUTPATIENT
Start: 2018-04-09 | End: 2018-04-09 | Stop reason: HOSPADM

## 2018-04-09 RX ORDER — DEXAMETHASONE SODIUM PHOSPHATE 4 MG/ML
INJECTION, SOLUTION INTRA-ARTICULAR; INTRALESIONAL; INTRAMUSCULAR; INTRAVENOUS; SOFT TISSUE PRN
Status: DISCONTINUED | OUTPATIENT
Start: 2018-04-09 | End: 2018-04-09

## 2018-04-09 RX ADMIN — FENTANYL CITRATE 100 MCG: 50 INJECTION, SOLUTION INTRAMUSCULAR; INTRAVENOUS at 07:28

## 2018-04-09 RX ADMIN — PROPOFOL 170 MG: 10 INJECTION, EMULSION INTRAVENOUS at 07:28

## 2018-04-09 RX ADMIN — SODIUM CHLORIDE 1000 ML: 9 INJECTION, SOLUTION INTRAVENOUS at 11:37

## 2018-04-09 RX ADMIN — ROCURONIUM BROMIDE 5 MG: 10 INJECTION INTRAVENOUS at 08:30

## 2018-04-09 RX ADMIN — FENTANYL CITRATE 50 MCG: 50 INJECTION INTRAMUSCULAR; INTRAVENOUS at 10:18

## 2018-04-09 RX ADMIN — HYDROMORPHONE HYDROCHLORIDE 0.5 MG: 1 INJECTION, SOLUTION INTRAMUSCULAR; INTRAVENOUS; SUBCUTANEOUS at 09:40

## 2018-04-09 RX ADMIN — CELECOXIB 400 MG: 200 CAPSULE ORAL at 05:57

## 2018-04-09 RX ADMIN — ACETAMINOPHEN 975 MG: 325 TABLET, FILM COATED ORAL at 20:55

## 2018-04-09 RX ADMIN — SODIUM CHLORIDE, POTASSIUM CHLORIDE, SODIUM LACTATE AND CALCIUM CHLORIDE: 600; 310; 30; 20 INJECTION, SOLUTION INTRAVENOUS at 07:00

## 2018-04-09 RX ADMIN — ROCURONIUM BROMIDE 10 MG: 10 INJECTION INTRAVENOUS at 08:15

## 2018-04-09 RX ADMIN — OXYCODONE HYDROCHLORIDE 10 MG: 5 TABLET ORAL at 14:44

## 2018-04-09 RX ADMIN — ACETAMINOPHEN 975 MG: 325 TABLET, FILM COATED ORAL at 13:29

## 2018-04-09 RX ADMIN — DEXAMETHASONE SODIUM PHOSPHATE 4 MG: 4 INJECTION, SOLUTION INTRA-ARTICULAR; INTRALESIONAL; INTRAMUSCULAR; INTRAVENOUS; SOFT TISSUE at 07:28

## 2018-04-09 RX ADMIN — CEFAZOLIN SODIUM 2 G: 2 INJECTION, SOLUTION INTRAVENOUS at 07:18

## 2018-04-09 RX ADMIN — CHOLECALCIFEROL CAP 125 MCG (5000 UNIT) 5000 UNITS: 125 CAP at 16:32

## 2018-04-09 RX ADMIN — PHENYLEPHRINE HYDROCHLORIDE 50 MCG: 10 INJECTION, SOLUTION INTRAMUSCULAR; INTRAVENOUS; SUBCUTANEOUS at 08:15

## 2018-04-09 RX ADMIN — LABETALOL HYDROCHLORIDE 5 MG: 5 INJECTION, SOLUTION INTRAVENOUS at 07:45

## 2018-04-09 RX ADMIN — ONDANSETRON 4 MG: 2 INJECTION INTRAMUSCULAR; INTRAVENOUS at 11:37

## 2018-04-09 RX ADMIN — GLYCOPYRROLATE 0.2 MG: 0.2 INJECTION, SOLUTION INTRAMUSCULAR; INTRAVENOUS at 07:28

## 2018-04-09 RX ADMIN — LIDOCAINE HYDROCHLORIDE 50 MG: 10 INJECTION, SOLUTION INFILTRATION; PERINEURAL at 07:15

## 2018-04-09 RX ADMIN — SODIUM CHLORIDE, POTASSIUM CHLORIDE, SODIUM LACTATE AND CALCIUM CHLORIDE: 600; 310; 30; 20 INJECTION, SOLUTION INTRAVENOUS at 08:00

## 2018-04-09 RX ADMIN — ONDANSETRON 4 MG: 2 INJECTION INTRAMUSCULAR; INTRAVENOUS at 07:45

## 2018-04-09 RX ADMIN — ROCURONIUM BROMIDE 40 MG: 10 INJECTION INTRAVENOUS at 07:28

## 2018-04-09 RX ADMIN — RANITIDINE 150 MG: 150 TABLET ORAL at 20:39

## 2018-04-09 RX ADMIN — SODIUM CHLORIDE, POTASSIUM CHLORIDE, SODIUM LACTATE AND CALCIUM CHLORIDE: 600; 310; 30; 20 INJECTION, SOLUTION INTRAVENOUS at 08:45

## 2018-04-09 RX ADMIN — SERTRALINE HYDROCHLORIDE 150 MG: 100 TABLET ORAL at 13:29

## 2018-04-09 RX ADMIN — ONDANSETRON 4 MG: 2 INJECTION INTRAMUSCULAR; INTRAVENOUS at 17:50

## 2018-04-09 RX ADMIN — TOLTERODINE TARTRATE 2 MG: 2 CAPSULE, EXTENDED RELEASE ORAL at 13:29

## 2018-04-09 RX ADMIN — SENNOSIDES AND DOCUSATE SODIUM 1 TABLET: 8.6; 5 TABLET ORAL at 20:39

## 2018-04-09 RX ADMIN — ATORVASTATIN CALCIUM 40 MG: 40 TABLET, FILM COATED ORAL at 13:29

## 2018-04-09 RX ADMIN — LABETALOL HYDROCHLORIDE 5 MG: 5 INJECTION, SOLUTION INTRAVENOUS at 08:01

## 2018-04-09 RX ADMIN — FENTANYL CITRATE 50 MCG: 50 INJECTION INTRAMUSCULAR; INTRAVENOUS at 10:02

## 2018-04-09 RX ADMIN — MAGNESIUM OXIDE TAB 400 MG (241.3 MG ELEMENTAL MG) 400 MG: 400 (241.3 MG) TAB at 16:32

## 2018-04-09 RX ADMIN — OXYCODONE HYDROCHLORIDE 10 MG: 5 TABLET ORAL at 20:39

## 2018-04-09 RX ADMIN — HYDROMORPHONE HYDROCHLORIDE 0.5 MG: 1 INJECTION, SOLUTION INTRAMUSCULAR; INTRAVENOUS; SUBCUTANEOUS at 10:33

## 2018-04-09 RX ADMIN — RANITIDINE 150 MG: 150 TABLET ORAL at 13:30

## 2018-04-09 RX ADMIN — Medication 1 G: at 08:45

## 2018-04-09 RX ADMIN — FENTANYL CITRATE 100 MCG: 50 INJECTION, SOLUTION INTRAMUSCULAR; INTRAVENOUS at 07:45

## 2018-04-09 RX ADMIN — SODIUM CHLORIDE: 9 INJECTION, SOLUTION INTRAVENOUS at 13:42

## 2018-04-09 RX ADMIN — AMITRIPTYLINE HYDROCHLORIDE 25 MG: 25 TABLET, FILM COATED ORAL at 20:40

## 2018-04-09 RX ADMIN — CEFAZOLIN SODIUM 2 G: 2 INJECTION, SOLUTION INTRAVENOUS at 16:32

## 2018-04-09 RX ADMIN — HYDROMORPHONE HYDROCHLORIDE 1 MG: 1 INJECTION, SOLUTION INTRAMUSCULAR; INTRAVENOUS; SUBCUTANEOUS at 07:45

## 2018-04-09 RX ADMIN — MIDAZOLAM 2 MG: 1 INJECTION INTRAMUSCULAR; INTRAVENOUS at 07:23

## 2018-04-09 RX ADMIN — PHENYLEPHRINE HYDROCHLORIDE 100 MCG: 10 INJECTION, SOLUTION INTRAMUSCULAR; INTRAVENOUS; SUBCUTANEOUS at 08:30

## 2018-04-09 RX ADMIN — OXYCODONE HYDROCHLORIDE 10 MG: 5 TABLET ORAL at 11:42

## 2018-04-09 RX ADMIN — FENTANYL CITRATE 50 MCG: 50 INJECTION, SOLUTION INTRAMUSCULAR; INTRAVENOUS at 08:15

## 2018-04-09 RX ADMIN — CELECOXIB 200 MG: 200 CAPSULE ORAL at 20:39

## 2018-04-09 RX ADMIN — Medication 1 G: at 07:35

## 2018-04-09 RX ADMIN — PHENYLEPHRINE HYDROCHLORIDE 100 MCG: 10 INJECTION, SOLUTION INTRAMUSCULAR; INTRAVENOUS; SUBCUTANEOUS at 08:00

## 2018-04-09 NOTE — CONSULTS
[facility]    Hospitalist Consultation    Date of Admission:  4/9/2018    Assessment & Plan   Delilah Miranda is a 57 year old female who was admitted on 4/9/2018. I was asked to see the patient for post operative medical care. Takes no preadmission prescription medicines, will order her CPAP at her home settings    Problem 1: Osteoarthritis, both knees and right hip  Had total hip arthroplasty today.  -   -   -     Problem 2: Obstructive sleep apnea, being treated and does well with her CPAP  - history of pneumonia and influenza, in the past  -   -     Problem 3: Urgency urinary incontinence, longer standing problem  - history of bladder sling for stress incontinence that has worked well  -   -     DVT Prophylaxis: Pneumatic Compression Devices  Code Status: No Order    Disposition: Expected discharge in 2-3 days once stable and ok with Dr. Rubi .    Yaya Fields MD    Reason for Consult   Reason for consult: I was asked by Dr. Rubi  to evaluate this patient for post operative cares.    Primary Care Physician   Dr Roxana Cuadra    Chief Complaint   I just had my right hip replaced    History is obtained from the patient    History of Present Illness   Delilah Miranda is a 57 year old female who presents with pain in her right leg and a diagnosis of both knees having osteoarthritis and a recent history of right hip arthritis that required surgery. Her preop medicine has been Ibuprofen    Past Medical History    No known drug allergies  SHIRA  Urinary urgency incontinence    Past Surgical History   Bladder suspension surgery  One C section, her third pregnancy. The first ended as a still birth, the second was a vaginal delivery  Arthroscopies, both knees    Prior to Admission Medications   Prior to Admission Medications   Prescriptions Last Dose Informant Patient Reported? Taking?   Biotin 16086 MCG TABS 4/7/2018  Yes Yes   Sig: Take 1 tablet by mouth daily   Magnesium ([employer] TRIPLE  MAGNESIUM COMPLEX) 400 MG CAPS 2018  Yes Yes   Sig: Take 1 capsule by mouth daily   Sertraline HCl (ZOLOFT PO) 2018 at Unknown time  Yes Yes   Sig: Take 150 mg by mouth daily   VITAMIN D, CHOLECALCIFEROL, PO 2018  Yes Yes   Sig: Take 5,000 Units by mouth daily   albuterol (ALBUTEROL) 108 (90 BASE) MCG/ACT inhaler More than a month at Unknown time  No No   Sig: Inhale 2 puffs into the lungs every 6 hours as needed INHALE 1 TO 2 PUFFS EVERY 4 TO 6 HOURS AS NEEDED   alpha-lipoic acid 600 MG CAPS 2018  Yes Yes   Sig: Take 1 capsule by mouth daily   amitriptyline (ELAVIL) 25 MG tablet 2018  No No   Sig: TAKE ONE TABLET BY MOUTH AT BEDTIME   aspirin 81 MG tablet 2018  Yes Yes   Sig: Take 1 tablet by mouth daily Pt instructed by MD to sop 1 week prior to surgery   atorvastatin (LIPITOR) 40 MG tablet 2018  No Yes   Sig: Take 1 tablet (40 mg) by mouth daily   cyclobenzaprine (FLEXERIL) 5 MG tablet 2018  Yes No   Sig: Take 5 mg by mouth 2 times daily as needed    fluticasone (FLONASE) 50 MCG/ACT spray 2018  Yes No   Sig: Spray 1 spray into both nostrils daily as needed for rhinitis or allergies   hydrOXYzine (ATARAX) 25 MG tablet 2018  No No   Sig: Take 1-2 tablets (25-50 mg) by mouth every 6 hours as needed for anxiety   ibuprofen (ADVIL/MOTRIN) 800 MG tablet 2018  Yes No   Si mg every 6 hours as needed (Will stop 7 days pre op) prn   methocarbamol (ROBAXIN) 500 MG tablet More than a month at Unknown time  No No   Sig: Take 2 tablets (1,000 mg) by mouth 3 times daily as needed for muscle spasms   solifenacin (VESICARE) 5 MG tablet 2018  No Yes   Sig: Take 1 tablet (5 mg) by mouth daily      Facility-Administered Medications: None     Allergies   Allergies   Allergen Reactions     No Known Drug Allergies      Metal [Staples] Rash       Social History   I have personally reviewed the social history with the patient showing , over 30 years. One son is a  pharmacist.    Family History   Mother  about age 85, cause unknown  Father  age 83, was diabetic, multiple heart and coronary artery probles  One sister with diabetes  2 siblings with mental health  problems    Review of Systems   The 10 point Review of Systems is negative other than noted in the HPI or here. The urinary incontinence. Bowels work well, uses a fiber gummy daily. No dyspnea, cough, headaches, chest or abdominal pains.     Physical Exam   Temp: 98.1  F (36.7  C) Temp src: Oral BP: 110/73 Pulse: 86 Heart Rate: 97 Resp: 20 SpO2: 96 % O2 Device: Nasal cannula Oxygen Delivery: 3 LPM  Vital Signs with Ranges  Temp:  [96.2  F (35.7  C)-99.3  F (37.4  C)] 98.1  F (36.7  C)  Pulse:  [86-92] 86  Heart Rate:  [79-98] 97  Resp:  [9-22] 20  BP: (110-144)/(67-94) 110/73  FiO2 (%):  [100 %] 100 %  SpO2:  [91 %-100 %] 96 %  184 lbs 0 oz    Constitutional: alert, cooperative  Eyes: clear  HEENT: mucous membranes are moist  Respiratory: clear to A&P  Cardiovascular: regular rhythm, normal S1 and S2, no murmurs or  S3, no edema  GI: normal bowel sounds, not tender  Lymph/Hematologic: not examined  Genitourinary: not examined  Skin: no rashes   Musculoskeletal: bandage right hip  Neurologic: Cranial nerves II  Through XII are intact  , strength is symmetric  Psychiatric: alert, cooperative    Data   -Data reviewed today: All pertinent laboratory and imaging results from this encounter were reviewed. I personally reviewed no images or EKG's today.  No lab results found in last 7 days.    Imaging:  Recent Results (from the past 24 hour(s))   XR Hip Port Right 1 View    Narrative    This exam was marked as non-reportable because it will not be read by a   radiologist or a [place] non-radiologist provider.             XR Pelvis w Hip Port Right 1 View    Narrative    XR PELVIS AD HIP PORTABLE RIGHT 1 VIEW 2018 11:06 AM    HISTORY: Postop.    COMPARISON: 2018.      Impression    IMPRESSION: Status post  right total hip arthroplasty. Hardware is  intact. Alignment is anatomic. Moderate degenerative changes of the  left hip.    AMARA BUSTAMANTE MD

## 2018-04-09 NOTE — PROGRESS NOTES
Admitted to room 606. Alert, orientated. Pleasant. CMS intact. Denies numbness. Able to lift left leg off bed, unable to lift right leg off bed. Strong dorsi and planter flexions. No nausea. Pain to right hip. Dressing dry. No drain. Huerta drains well. Planning to go home at d/c with . No acute variances at this time. No edema. Bed alarm on. Capnography on. CPAP at bedside.   1336 Following plan of care. Oxycodone for pain. No acute variances. Pleasant.

## 2018-04-09 NOTE — ANESTHESIA PREPROCEDURE EVALUATION
Anesthesia Evaluation     . Pt has had prior anesthetic.     History of anesthetic complications   - PONV        ROS/MED HX    ENT/Pulmonary:     (+)sleep apnea, asthma uses CPAP , . .    Neurologic:       Cardiovascular:     (+) Dyslipidemia, ----. : . . . :. .       METS/Exercise Tolerance:     Hematologic:         Musculoskeletal:   (+) arthritis, , , -       GI/Hepatic:         Renal/Genitourinary:     (+) Other Renal/ Genitourinary, incont      Endo:         Psychiatric:     (+) psychiatric history anxiety and depression      Infectious Disease:         Malignancy:         Other:                     Physical Exam  Normal systems: cardiovascular and pulmonary    Airway   Mallampati: II  TM distance: >3 FB  Neck ROM: full    Dental     Cardiovascular       Pulmonary                     Anesthesia Plan      History & Physical Review  History and physical reviewed and following examination; no interval change.    ASA Status:  2 .    NPO Status:  > 8 hours    Plan for General and ETT with Intravenous and Propofol induction. Maintenance will be Balanced.    PONV prophylaxis:  Ondansetron (or other 5HT-3) and Dexamethasone or Solumedrol       Postoperative Care  Postoperative pain management:  IV analgesics.      Consents  Anesthetic plan, risks, benefits and alternatives discussed with:  Patient.  Use of blood products discussed: Yes.   Use of blood products discussed with Patient.  Consented to blood products.  .                          .

## 2018-04-09 NOTE — ANESTHESIA POSTPROCEDURE EVALUATION
Patient: Delilah Miranda    Procedure(s):  right total hip arthroplasty    surgeon request choice anesthesia - Wound Class: I-Clean    Diagnosis:right degenerative joint disease  Diagnosis Additional Information: Pre-operative diagnosis: right degenerative joint disease  Post-operative diagnosis same  Procedure: Procedure(s):  right total hip arthroplasty     surgeon request choice anesthesia - Wound Class: I-Clean  Surgeon(s): Surgeon(s) and Role:     * aiyana Pugh Joseph Patrick, MD - Primary     * Darlyn Justin PA-C - Assisting  Estimated blood loss: * No values recorded between 4/9/2018  7:53 AM and 4/9/2018  9:20 AM *                   Specimens: * No specimens in log *  Findings: See dictation         Anesthesia Type:  General, ETT    Note:  Anesthesia Post Evaluation    Patient location during evaluation: PACU  Patient participation: Able to fully participate in evaluation  Level of consciousness: awake  Pain management: adequate  Airway patency: patent  Cardiovascular status: acceptable  Respiratory status: acceptable  Hydration status: acceptable  PONV: none     Anesthetic complications: None          Last vitals:  Vitals:    04/09/18 1118 04/09/18 1133 04/09/18 1144   BP: 116/85 131/79    Pulse:      Resp: 13 14 16   Temp: 96.2  F (35.7  C)     SpO2: 97% 98% 98%         Electronically Signed By: Jamie Marcelino MD  April 9, 2018  12:12 PM

## 2018-04-09 NOTE — IP AVS SNAPSHOT
[facility]    [address] [facility] [address]    Phone:  [phone]    Fax:  [phone]                                       After Visit Summary   4/9/2018    Delilah Miranda    MRN: [id number]           After Visit Summary Signature Page     I have received my discharge instructions, and my questions have been answered. I have discussed any challenges I see with this plan with the nurse or doctor.    ..........................................................................................................................................  Patient/Patient Representative Signature      ..........................................................................................................................................  Patient Representative Print Name and Relationship to Patient    ..................................................               ................................................  Date                                            Time    ..........................................................................................................................................  Reviewed by Signature/Title    ...................................................              ..............................................  Date                                                            Time

## 2018-04-09 NOTE — ANESTHESIA CARE TRANSFER NOTE
Patient: Delilah Miranda    Procedure(s):  right total hip arthroplasty    surgeon request choice anesthesia - Wound Class: I-Clean    Diagnosis: right degenerative joint disease  Diagnosis Additional Information: No value filed.    Anesthesia Type:   General, ETT     Note:  Airway :Face Mask  Patient transferred to:PACU  Comments: Pt VSS, report to RN      Vitals: (Last set prior to Anesthesia Care Transfer)    CRNA VITALS  4/9/2018 0848 - 4/9/2018 0931      4/9/2018             Resp Rate (observed): (!)  3                Electronically Signed By: SHERWIN Tanner CRNA  April 9, 2018  9:31 AM

## 2018-04-09 NOTE — OP NOTE
Procedure Date: 04/09/2018      PREOPERATIVE DIAGNOSIS:  Right hip primary osteoarthritis.      POSTOPERATIVE DIAGNOSIS:  Right hip primary osteoarthritis.        PROCEDURE:  Right total hip arthroplasty using a Biomet G7 acetabulum and Taperloc femoral stem.      SURGEON:  Bryon Zaragoza MD      FIRST ASSISTANT:  Darlyn Justin PA-C      INDICATION FOR SURGERY:  Ms. Miranda is a very pleasant 57-year-old female who has had ongoing right hip pain for some time now.  She has failed conservative management of her right hip osteoarthritis for some time now.  She now wishes to proceed with operative intervention.  She understands the risks, benefits and alternatives of total hip arthroplasty and wishes to proceed with surgery.      NARRATIVE EVENTS:  After thorough preoperative evaluation and proper identification of the patient's extremity to be operated on, Ms. Miranda was taken to the operating room where she underwent a general anesthetic, placed in the right lateral decubitus position on the operating table and her right hip was prepped and draped in the usual sterile manner.  After appropriate surgical pause to confirm the patient's extremity to be operated on and 30 minutes after the patient received 2 grams of Ancef, her right hip was approached through a lateral incision centered over the greater trochanter.  Skin and soft tissue were sharply dissected down to the tensor fascia.  The fascia was split in line with the fibers in line with the femur and taken down to the trochanteric bursa.  The bursal tissue was removed.  The anterior one-third of the gluteus medius was moved off the greater trochanter in a modified Hardinge fashion.  This took us down into the joint capsule which was T'd and the femoral head was surgically dislocated from the acetabulum.  We then made our femoral neck osteotomy 12 mm proximal to the lesser trochanter in accordance with our preoperative templating.  We removed the femoral head  from the field and exposed the acetabulum.  We then removed the ligamentum teres, transverse acetabular ligament and labrum and sequentially reamed the acetabulum up to fit a size 52 Biomet G7 acetabular component.  We impacted this into position in approximately 40 degrees of abduction and 20 degrees of anteversion.  We had good primary stability of the cup but we augmented this with 2 screws, 1 in the inner table of the pelvis and 1 in the posterior column.  Once this was solidly into position, we then impacted the flat polyethylene liner to fit a size 36 mm femoral head and paid our attention to the femur.  Here, we removed the bone from the piriformis fossa using the initial entry reamer to lateralize and sequentially broached the femur up to fit a size 7 Biomet Taperloc femoral stem with the standard neck offset and a -3 neck length and a 36 mm femoral head.  This gave us good stability and full range of motion, and appropriate soft tissue tension and leg length.  So at this point in time, we prepared to place our final implants into position, a size 7 femur was impacted in the femur.  Once this was solidly into position, we retrialed our femoral heads and found that a -3 neck length and a 36 mm femoral head gave us the best stability, full range of motion, appropriate leg length and soft tissue tension.  So, the decision was made to place a Biolox ceramic 36 mm femoral head with a -3 neck length onto the trunnion.  Once this was impacted into position, we reduced the hip.  We closed the joint capsule with interrupted 0 Vicryl sutures, closed the abductors with #5 Ethibond sutures through bone tunnels.  We closed the tensor fascia with interrupted and running 0 Vicryl sutures and the skin with absorbable sutures and nylon sutures in the skin.  The patient was placed in a well-padded postoperative dressing and taken to the recovery room in stable condition.  She tolerated the procedure without difficulty.          DARIANA GRAVES MD             D: 2018   T: 2018   MT: ALICJA      Name:     STEPHEN WHITTINGTON   MRN:      [id number]        Account:        [id number]   :      1960           Procedure Date: 2018      Document: [id number]

## 2018-04-10 ENCOUNTER — APPOINTMENT (OUTPATIENT)
Dept: PHYSICAL THERAPY | Facility: CLINIC | Age: 58
DRG: 470 | End: 2018-04-10
Attending: ORTHOPAEDIC SURGERY
Payer: COMMERCIAL

## 2018-04-10 LAB
GLUCOSE SERPL-MCNC: 148 MG/DL (ref 70–99)
HGB BLD-MCNC: 8.7 G/DL (ref 11.7–15.7)

## 2018-04-10 PROCEDURE — 97110 THERAPEUTIC EXERCISES: CPT | Mod: GP | Performed by: PHYSICAL THERAPIST

## 2018-04-10 PROCEDURE — 25000132 ZZH RX MED GY IP 250 OP 250 PS 637: Performed by: ORTHOPAEDIC SURGERY

## 2018-04-10 PROCEDURE — 97530 THERAPEUTIC ACTIVITIES: CPT | Mod: GP | Performed by: PHYSICAL THERAPIST

## 2018-04-10 PROCEDURE — 82947 ASSAY GLUCOSE BLOOD QUANT: CPT | Performed by: ORTHOPAEDIC SURGERY

## 2018-04-10 PROCEDURE — 97161 PT EVAL LOW COMPLEX 20 MIN: CPT | Mod: GP | Performed by: PHYSICAL THERAPIST

## 2018-04-10 PROCEDURE — 12000000 ZZH R&B MED SURG/OB

## 2018-04-10 PROCEDURE — 99231 SBSQ HOSP IP/OBS SF/LOW 25: CPT | Performed by: INTERNAL MEDICINE

## 2018-04-10 PROCEDURE — 85018 HEMOGLOBIN: CPT | Performed by: ORTHOPAEDIC SURGERY

## 2018-04-10 PROCEDURE — 36415 COLL VENOUS BLD VENIPUNCTURE: CPT | Performed by: ORTHOPAEDIC SURGERY

## 2018-04-10 PROCEDURE — 25000128 H RX IP 250 OP 636: Performed by: ORTHOPAEDIC SURGERY

## 2018-04-10 PROCEDURE — 99207 ZZC CDG-MDM COMPONENT: MEETS LOW - DOWN CODED: CPT | Performed by: INTERNAL MEDICINE

## 2018-04-10 PROCEDURE — 97116 GAIT TRAINING THERAPY: CPT | Mod: GP | Performed by: PHYSICAL THERAPIST

## 2018-04-10 PROCEDURE — 40000193 ZZH STATISTIC PT WARD VISIT: Performed by: PHYSICAL THERAPIST

## 2018-04-10 RX ORDER — OXYCODONE AND ACETAMINOPHEN 5; 325 MG/1; MG/1
1-2 TABLET ORAL EVERY 4 HOURS PRN
Qty: 75 TABLET | Refills: 0 | Status: SHIPPED | OUTPATIENT
Start: 2018-04-10 | End: 2018-07-12

## 2018-04-10 RX ADMIN — ACETAMINOPHEN 975 MG: 325 TABLET, FILM COATED ORAL at 22:30

## 2018-04-10 RX ADMIN — ONDANSETRON 4 MG: 2 INJECTION INTRAMUSCULAR; INTRAVENOUS at 01:12

## 2018-04-10 RX ADMIN — ACETAMINOPHEN 975 MG: 325 TABLET, FILM COATED ORAL at 06:16

## 2018-04-10 RX ADMIN — HYDROXYZINE HYDROCHLORIDE 25 MG: 25 TABLET ORAL at 16:44

## 2018-04-10 RX ADMIN — OXYCODONE HYDROCHLORIDE 10 MG: 5 TABLET ORAL at 12:33

## 2018-04-10 RX ADMIN — OXYCODONE HYDROCHLORIDE 10 MG: 5 TABLET ORAL at 08:59

## 2018-04-10 RX ADMIN — SENNOSIDES AND DOCUSATE SODIUM 2 TABLET: 8.6; 5 TABLET ORAL at 19:58

## 2018-04-10 RX ADMIN — CHOLECALCIFEROL CAP 125 MCG (5000 UNIT) 5000 UNITS: 125 CAP at 08:57

## 2018-04-10 RX ADMIN — CELECOXIB 200 MG: 200 CAPSULE ORAL at 19:58

## 2018-04-10 RX ADMIN — OXYCODONE HYDROCHLORIDE 10 MG: 5 TABLET ORAL at 19:58

## 2018-04-10 RX ADMIN — MAGNESIUM OXIDE TAB 400 MG (241.3 MG ELEMENTAL MG) 400 MG: 400 (241.3 MG) TAB at 08:58

## 2018-04-10 RX ADMIN — ATORVASTATIN CALCIUM 40 MG: 40 TABLET, FILM COATED ORAL at 08:58

## 2018-04-10 RX ADMIN — RANITIDINE 150 MG: 150 TABLET ORAL at 08:59

## 2018-04-10 RX ADMIN — ONDANSETRON 4 MG: 2 INJECTION INTRAMUSCULAR; INTRAVENOUS at 06:17

## 2018-04-10 RX ADMIN — ENOXAPARIN SODIUM 40 MG: 40 INJECTION SUBCUTANEOUS at 06:20

## 2018-04-10 RX ADMIN — CEFAZOLIN SODIUM 2 G: 2 INJECTION, SOLUTION INTRAVENOUS at 01:16

## 2018-04-10 RX ADMIN — RANITIDINE 150 MG: 150 TABLET ORAL at 19:58

## 2018-04-10 RX ADMIN — SERTRALINE HYDROCHLORIDE 150 MG: 100 TABLET ORAL at 08:59

## 2018-04-10 RX ADMIN — CELECOXIB 200 MG: 200 CAPSULE ORAL at 08:57

## 2018-04-10 RX ADMIN — ACETAMINOPHEN 975 MG: 325 TABLET, FILM COATED ORAL at 13:54

## 2018-04-10 RX ADMIN — SODIUM CHLORIDE: 9 INJECTION, SOLUTION INTRAVENOUS at 00:23

## 2018-04-10 RX ADMIN — OXYCODONE HYDROCHLORIDE 10 MG: 5 TABLET ORAL at 16:44

## 2018-04-10 RX ADMIN — AMITRIPTYLINE HYDROCHLORIDE 25 MG: 25 TABLET, FILM COATED ORAL at 22:30

## 2018-04-10 RX ADMIN — TOLTERODINE TARTRATE 2 MG: 2 CAPSULE, EXTENDED RELEASE ORAL at 08:57

## 2018-04-10 RX ADMIN — HYDROXYZINE HYDROCHLORIDE 25 MG: 25 TABLET ORAL at 09:02

## 2018-04-10 RX ADMIN — SENNOSIDES AND DOCUSATE SODIUM 2 TABLET: 8.6; 5 TABLET ORAL at 08:57

## 2018-04-10 RX ADMIN — OXYCODONE HYDROCHLORIDE 10 MG: 5 TABLET ORAL at 01:11

## 2018-04-10 ASSESSMENT — PAIN DESCRIPTION - DESCRIPTORS: DESCRIPTORS: SHARP

## 2018-04-10 NOTE — PROGRESS NOTES
SPIRITUAL HEALTH SERVICES Progress Note  [facility]    Responded to ACP consult to validate healthcare directive (HCD).  HCD was found in pt binder.  Reviewed document and found that it was not notarized or signed by two witnesses.  Returned HCD to pt and explained requirement for notarization or witnesses' signature.  Pt will ask her  to return the original to be notarized.    Oriented pt to  services.  Pt reported feeing more positive about her QOL after surgery and welcomed prayer.    Plan: I and other chaplains remain available to assist with notarization of document and sending it to HIM.    Bonilla Gonsalves M.Div., [facility]  Staff   Pager [phone]

## 2018-04-10 NOTE — PLAN OF CARE
Problem: Patient Care Overview  Goal: Plan of Care/Patient Progress Review  Outcome: Improving  jaspal PO well, up with SBA with gait belt and walker, PO pain meds managing pain, voiding in good amts, plans to dc to home with  on Thursday later afternoon after  is done with work

## 2018-04-10 NOTE — PROGRESS NOTES
[facility]  Hospitalist Progress Note  Name: Delilah Miranda    MRN: [id number]  Provider:  Corey Floyd MD  04/10/18    Initial presenting complaint/issue to hospital (Diagnosis): s/p R MEKA.         Assessment and Plan:      Summary of Stay: Delilah Miranda is a 57 year old female admitted on 4/9/2018 with s/p R MEKA POD #1.      Problem List:     1. S/p R MEKA POD #1.  - cares and DVT ppx per ortho.    2. HLP.  - On lipitor.    3. SHIRA.  - CPAP qhs.    4. OAB.  - On detrol LA.    # Pain Assessment:  Current Pain Score 4/10/2018   Patient currently in pain? yes   Pain score (0-10) 5   Pain location Hip   Pain descriptors Aching   - Delilah is experiencing pain due to R hip pain. Pain management was discussed and the plan was created in a collaborative fashion.  Delilah's response to the current recommendations: engaged  - oxycodone, dialudid.          DVT Prophylaxis:  -  lovenox.  Code Status: Full Code  Discharge Dispo: home.  Estimated Disch Date / # of Days until Discharge: per ortho        Interval History:        No chest pain/SOB/fever/chills/N/V.                  Physical Exam:      Last Vital Signs:  Temp: 97.6  F (36.4  C) Temp src: Oral BP: 122/61 Pulse: 102 Heart Rate: 109 Resp: 16 SpO2: 97 % O2 Device: None (Room air) Oxygen Delivery: 2 LPM    Intake/Output Summary (Last 24 hours) at 04/10/18 1231  Last data filed at 04/10/18 0831   Gross per 24 hour   Intake             2639 ml   Output             2500 ml   Net              139 ml     I/O last 3 completed shifts:  In: 5239 [P.O.:980; I.V.:3416; IV Piggyback:843]  Out: 2950 [Urine:2750; Emesis/NG output:200]  Vitals:    04/09/18 0537   Weight: 83.5 kg (184 lb)       Gen - AAO x 3 in NAD.  Lungs - CTA B.  Heart - RR,S1+S2 nml, no m/g/r.  Abd - soft, NT, ND, + BS.  Ext - no edema         Medications:      All current medications were reviewed.         Data:      All new lab and imaging data was reviewed.   Labs:    Recent Labs  Lab  04/10/18  0730   HGB 8.7*       Recent Labs  Lab 04/10/18  0730   *      Recent Imaging:   No results found for this or any previous visit (from the past 24 hour(s)).

## 2018-04-10 NOTE — PROGRESS NOTES
"Patient requested Healing Touch. Healing Touch explained to patient and patient consented to HT session. Pain prior to session 3/10. Performed Healing Touch with music. Patient denies pain post session \"I feel really relaxed and feel I can sleep.\" Time spent with patient 60 minutes.    Ryder GAVIRIAN RN BC HNB-BC  "

## 2018-04-10 NOTE — PROGRESS NOTES
04/10/18 0953   Quick Adds   Type of Visit Initial PT Evaluation   Living Environment   Lives With spouse   Living Arrangements house   Home Accessibility stairs within home;stairs to enter home   Number of Stairs to Enter Home 2   Number of Stairs Within Home 14   Stair Railings at Home inside, present on right side   Transportation Available family or friend will provide   Living Environment Comment Pt plans to have spouse home x ~4-5 days to assist prn, has walk in and tub shower and different levels of home.   Self-Care   Dominant Hand right   Usual Activity Tolerance good  (limited lately due to progressive hip pain)   Current Activity Tolerance moderate   Regular Exercise no   Equipment Currently Used at Home cane, straight   Activity/Exercise/Self-Care Comment Saurav PLOF, has been using a SEC due to hip pain.  Has toilet riser.  Off from work until mid-May.   Functional Level Prior   Ambulation 1-->assistive equipment   Transferring 0-->independent   Toileting 0-->independent   Bathing 0-->independent   Dressing 0-->independent   Eating 0-->independent   Communication 0-->understands/communicates without difficulty   Swallowing 0-->swallows foods/liquids without difficulty   Cognition 0 - no cognition issues reported   Fall history within last six months yes   Number of times patient has fallen within last six months 2   Which of the above functional risks had a recent onset or change? ambulation;transferring;toileting;bathing;fall history   Prior Functional Level Comment Saurav PLJOSE MARIA, works as nurse, but has been fairly limited over past several weeks due to progressive hip pain.   General Information   Onset of Illness/Injury or Date of Surgery - Date 04/09/18   Referring Physician Bryon Zaragoza MD   Patient/Family Goals Statement d/c home with spouse support, return to walking program, return to work in ~4 wks   Pertinent History of Current Problem (include personal factors and/or comorbidities  that impact the POC) Pt seen POD #1 s/p R MEKA.  Hx of SHIRA, OA B knees.   Precautions/Limitations fall precautions  (no hip precautions secondary to surgical approach)   Weight-Bearing Status - RLE weight-bearing as tolerated   General Observations pt awake and alert, pain well controlled   Cognitive Status Examination   Orientation orientation to person, place and time   Pain Assessment   Patient Currently in Pain Yes, see Vital Sign flowsheet  (3/10 R hip)   Integumentary/Edema   Integumentary/Edema Comments incisional dressing dry and intact, no drains, no distal swelling   Posture    Posture Forward head position   Range of Motion (ROM)   ROM Comment WNL LLE and BUEs, R hip AAROM up to ~90 deg flexion.   Strength   Strength Comments WNL LLE and BUEs, good functional strength returning s/p R MEKA, full 5/5 strength B ankle DF/PF.   Bed Mobility   Bed Mobility Comments Min A    Transfer Skills   Transfer Comments CGA sit <> stand with FWW, WBAT with good tolerance.   Gait   Gait Comments Amb with FWW and CGA, using 3-pt gait initially, progressing to step-thru with practice, pain 3/10.   Balance   Balance Comments impaired gait stability due to pain/weakness post op, compensating well with FWW, CGA for safety.   Sensory Examination   Sensory Perception no deficits were identified   Muscle Tone   Muscle Tone Comments minimal mm guarding about R hip noted   Modality Interventions   Planned Modality Interventions Cryotherapy   Planned Modality Interventions Comments prn R hip   General Therapy Interventions   Planned Therapy Interventions balance training;bed mobility training;gait training;neuromuscular re-education;ROM;strengthening;stretching;transfer training;risk factor education;home program guidelines;progressive activity/exercise   Clinical Impression   Criteria for Skilled Therapeutic Intervention yes, treatment indicated   PT Diagnosis Impaired functional mobility s/p MEKA   Influenced by the following  "impairments Pain, weakness, dec ROM, impaired balance   Functional limitations due to impairments Impaired tolerance with bed mobilities, transfers, gait, stairs   Clinical Presentation Stable/Uncomplicated   Clinical Presentation Rationale indep PLOF, current status, PMH, PLOF   Clinical Decision Making (Complexity) Low complexity   Therapy Frequency` 2 times/day   Predicted Duration of Therapy Intervention (days/wks) 2-3 days   Anticipated Equipment Needs at Discharge (owns SEC, consider FWW (order placed in chart if needed))   Anticipated Discharge Disposition Home   Risk & Benefits of therapy have been explained Yes   Patient, Family & other staff in agreement with plan of care Yes   [facility] TM \"6 Clicks\"   2016, Trustees of [facility], under license to [employer].  All rights reserved.   6 Clicks Short Forms Basic Mobility Inpatient Short Form   [facility]  \"6 Clicks\" V.2 Basic Mobility Inpatient Short Form   1. Turning from your back to your side while in a flat bed without using bedrails? 3 - A Little   2. Moving from lying on your back to sitting on the side of a flat bed without using bedrails? 3 - A Little   3. Moving to and from a bed to a chair (including a wheelchair)? 3 - A Little   4. Standing up from a chair using your arms (e.g., wheelchair, or bedside chair)? 3 - A Little   5. To walk in hospital room? 3 - A Little   6. Climbing 3-5 steps with a railing? 3 - A Little   Basic Mobility Raw Score (Score out of 24.Lower scores equate to lower levels of function) 18   Total Evaluation Time   Total Evaluation Time (Minutes) 15     "

## 2018-04-10 NOTE — PLAN OF CARE
Problem: Patient Care Overview  Goal: Plan of Care/Patient Progress Review    PT:  Eval complete, treatment initiated.  Pt seen POD #1 s/p R MEKA, WBAT with no hip precautions post op.  Pt lives with spouse in a multi-level home, functions independently at baseline, using a SEC recently with limited mobility due to progressive hip pain.      Discharge Planner PT   Patient plan for discharge: home with spouse support  Current status: Initiated mobility training, tolerating well.  Ed on HEP.  Amb x 125ft with FWW and CGA/SBA, pain rated 3/10.  Denies dizziness or nausea with activity.  Ed on progressive home walking program.  Barriers to return to prior living situation: none anticipated  Recommendations for discharge: home with continued HEP  Rationale for recommendations: Anticipate patient will progress well given PLOF and current functional status.  Will benefit from ongoing skilled PT intervention to improve mobility status prior to returning home.           Entered by: Quinn Prather 04/10/2018 10:08 AM

## 2018-04-10 NOTE — PLAN OF CARE
Problem: Patient Care Overview  Goal: Plan of Care/Patient Progress Review  Outcome: Improving  [facility] Orthopedic Nursing Progress Note       Assessment   Assessment:  Post-operative day #1  Total hip arthoplasty (Right)     CMS: is intact. Calves non-tender bilaterally.  Lungs: are clear. Sats dropped to 87% on CPAP without O2. Added 2L O2 to CPAP- sats 96%, encouraged to use incentive spirometer w/a.  BS: active, no flatus yet, jaspal clear liquids.   Urine: good output per weber. UCO this am- DTV   Surgical Site: Dressing is clean dry intact.   Pain: c/o minimal pain- controlled with  Po pain meds. Ice to incisional area.   Activity: bedrest  Slept well.      Olvin An RN

## 2018-04-10 NOTE — PROGRESS NOTES
"Orthopedic Surgery  4/10/2018  POD#: 1    S: Patient voices no complaints today. Feeling well and pain managed well.  Denies calf pain, dizziness, SOB.    O: Blood pressure 98/46, pulse 102, temperature 97.5  F (36.4  C), temperature source Oral, resp. rate 16, height 1.575 m (5' 2\"), weight 83.5 kg (184 lb), last menstrual period 04/25/2007, SpO2 95 %, not currently breastfeeding.  Lab Results   Component Value Date    HGB 14.1 03/29/2018     No results found for: INR     I/O last 3 completed shifts:  In: 5239 [P.O.:980; I.V.:3416; IV Piggyback:843]  Out: 2950 [Urine:2750; Emesis/NG output:200]    Neurovascularly intact.  Calves are negative bilaterally, both soft and nontender.  The wound is C/D/I.  The wound looks good with minimal erythema of the surrounding skin.    A: Ms. Miranda is doing well status post Procedure(s):  R MEKA    P:   1. Mobilize and continue physical therapy. No precautions.  2. Anticoagulation - dc on ASA  3. Pain management - controlled  4. Anticipate discharge to home, likely Thurs.      Darlyn Justin PA-C  O: [phone]  "

## 2018-04-10 NOTE — PLAN OF CARE
Problem: Patient Care Overview  Goal: Plan of Care/Patient Progress Review  Outcome: Improving  VS-stable, afebrile, on capnography.   Lung Sounds-clear, no cough, on 2L NC, on capnography  O2-on 2L NC, on capnography  GI-+bs, -flatus, lbm was yesterday. Tolerating reg diet, vomited x 1 200cc, taking zofran x 1 iv this shift. No other nausea.   -weber in and adequate.   IVF-at 100,   Dressings-aquacel drsg has few spots on it. Ice to hip,   CMS-denies numbness and tingling. +pp, scds on while in bed. Strong dorsi/planter flexion.   Drain-none  Activity- up at side of bed to dangle. With 2 assist, gait belt and walker. Took a few steps and tolerating well.   Pain-takes oxycodone as needed. Rates pain level a 2-3, goal is a 2.   D/C Plan-home when ready

## 2018-04-11 ENCOUNTER — APPOINTMENT (OUTPATIENT)
Dept: PHYSICAL THERAPY | Facility: CLINIC | Age: 58
DRG: 470 | End: 2018-04-11
Attending: ORTHOPAEDIC SURGERY
Payer: COMMERCIAL

## 2018-04-11 ENCOUNTER — APPOINTMENT (OUTPATIENT)
Dept: OCCUPATIONAL THERAPY | Facility: CLINIC | Age: 58
DRG: 470 | End: 2018-04-11
Attending: ORTHOPAEDIC SURGERY
Payer: COMMERCIAL

## 2018-04-11 LAB
GLUCOSE SERPL-MCNC: 139 MG/DL (ref 70–99)
HGB BLD-MCNC: 8 G/DL (ref 11.7–15.7)

## 2018-04-11 PROCEDURE — 85018 HEMOGLOBIN: CPT | Performed by: ORTHOPAEDIC SURGERY

## 2018-04-11 PROCEDURE — 97530 THERAPEUTIC ACTIVITIES: CPT | Mod: GP | Performed by: PHYSICAL THERAPY ASSISTANT

## 2018-04-11 PROCEDURE — 97165 OT EVAL LOW COMPLEX 30 MIN: CPT | Mod: GO | Performed by: STUDENT IN AN ORGANIZED HEALTH CARE EDUCATION/TRAINING PROGRAM

## 2018-04-11 PROCEDURE — 99231 SBSQ HOSP IP/OBS SF/LOW 25: CPT | Performed by: INTERNAL MEDICINE

## 2018-04-11 PROCEDURE — 36415 COLL VENOUS BLD VENIPUNCTURE: CPT | Performed by: ORTHOPAEDIC SURGERY

## 2018-04-11 PROCEDURE — 25000128 H RX IP 250 OP 636: Performed by: ORTHOPAEDIC SURGERY

## 2018-04-11 PROCEDURE — 97530 THERAPEUTIC ACTIVITIES: CPT | Mod: GO | Performed by: STUDENT IN AN ORGANIZED HEALTH CARE EDUCATION/TRAINING PROGRAM

## 2018-04-11 PROCEDURE — 99207 ZZC CDG-MDM COMPONENT: MEETS LOW - DOWN CODED: CPT | Performed by: INTERNAL MEDICINE

## 2018-04-11 PROCEDURE — 97535 SELF CARE MNGMENT TRAINING: CPT | Mod: GO | Performed by: STUDENT IN AN ORGANIZED HEALTH CARE EDUCATION/TRAINING PROGRAM

## 2018-04-11 PROCEDURE — 40000133 ZZH STATISTIC OT WARD VISIT: Performed by: STUDENT IN AN ORGANIZED HEALTH CARE EDUCATION/TRAINING PROGRAM

## 2018-04-11 PROCEDURE — 40000193 ZZH STATISTIC PT WARD VISIT: Performed by: PHYSICAL THERAPY ASSISTANT

## 2018-04-11 PROCEDURE — 97116 GAIT TRAINING THERAPY: CPT | Mod: GP | Performed by: PHYSICAL THERAPY ASSISTANT

## 2018-04-11 PROCEDURE — 82947 ASSAY GLUCOSE BLOOD QUANT: CPT | Performed by: ORTHOPAEDIC SURGERY

## 2018-04-11 PROCEDURE — 97110 THERAPEUTIC EXERCISES: CPT | Mod: GP | Performed by: PHYSICAL THERAPY ASSISTANT

## 2018-04-11 PROCEDURE — 12000000 ZZH R&B MED SURG/OB

## 2018-04-11 PROCEDURE — 25000132 ZZH RX MED GY IP 250 OP 250 PS 637: Performed by: ORTHOPAEDIC SURGERY

## 2018-04-11 RX ADMIN — MAGNESIUM OXIDE TAB 400 MG (241.3 MG ELEMENTAL MG) 400 MG: 400 (241.3 MG) TAB at 08:43

## 2018-04-11 RX ADMIN — OXYCODONE HYDROCHLORIDE 10 MG: 5 TABLET ORAL at 22:46

## 2018-04-11 RX ADMIN — ACETAMINOPHEN 975 MG: 325 TABLET, FILM COATED ORAL at 06:01

## 2018-04-11 RX ADMIN — OXYCODONE HYDROCHLORIDE 10 MG: 5 TABLET ORAL at 15:55

## 2018-04-11 RX ADMIN — ATORVASTATIN CALCIUM 40 MG: 40 TABLET, FILM COATED ORAL at 08:43

## 2018-04-11 RX ADMIN — CELECOXIB 200 MG: 200 CAPSULE ORAL at 08:43

## 2018-04-11 RX ADMIN — SENNOSIDES AND DOCUSATE SODIUM 2 TABLET: 8.6; 5 TABLET ORAL at 19:57

## 2018-04-11 RX ADMIN — OXYCODONE HYDROCHLORIDE 10 MG: 5 TABLET ORAL at 08:43

## 2018-04-11 RX ADMIN — AMITRIPTYLINE HYDROCHLORIDE 25 MG: 25 TABLET, FILM COATED ORAL at 22:46

## 2018-04-11 RX ADMIN — HYDROXYZINE HYDROCHLORIDE 25 MG: 25 TABLET ORAL at 14:52

## 2018-04-11 RX ADMIN — RANITIDINE 150 MG: 150 TABLET ORAL at 08:43

## 2018-04-11 RX ADMIN — SENNOSIDES AND DOCUSATE SODIUM 2 TABLET: 8.6; 5 TABLET ORAL at 08:42

## 2018-04-11 RX ADMIN — SERTRALINE HYDROCHLORIDE 150 MG: 100 TABLET ORAL at 08:43

## 2018-04-11 RX ADMIN — TOLTERODINE TARTRATE 2 MG: 2 CAPSULE, EXTENDED RELEASE ORAL at 08:43

## 2018-04-11 RX ADMIN — ACETAMINOPHEN 975 MG: 325 TABLET, FILM COATED ORAL at 22:46

## 2018-04-11 RX ADMIN — ACETAMINOPHEN 975 MG: 325 TABLET, FILM COATED ORAL at 14:52

## 2018-04-11 RX ADMIN — OXYCODONE HYDROCHLORIDE 10 MG: 5 TABLET ORAL at 00:50

## 2018-04-11 RX ADMIN — OXYCODONE HYDROCHLORIDE 10 MG: 5 TABLET ORAL at 12:32

## 2018-04-11 RX ADMIN — HYDROXYZINE HYDROCHLORIDE 25 MG: 25 TABLET ORAL at 08:43

## 2018-04-11 RX ADMIN — ENOXAPARIN SODIUM 40 MG: 40 INJECTION SUBCUTANEOUS at 08:42

## 2018-04-11 RX ADMIN — HYDROXYZINE HYDROCHLORIDE 25 MG: 25 TABLET ORAL at 00:50

## 2018-04-11 RX ADMIN — CHOLECALCIFEROL CAP 125 MCG (5000 UNIT) 5000 UNITS: 125 CAP at 08:42

## 2018-04-11 RX ADMIN — RANITIDINE 150 MG: 150 TABLET ORAL at 19:57

## 2018-04-11 ASSESSMENT — ACTIVITIES OF DAILY LIVING (ADL): PREVIOUS_RESPONSIBILITIES: DRIVING;WORK

## 2018-04-11 NOTE — PLAN OF CARE
Problem: Patient Care Overview  Goal: Plan of Care/Patient Progress Review  Outcome: Improving  jaspal PO well, up with SBA with gait belt and walker, PO pain meds managing pain, voiding in good amts, plans to dc to home tomorrow with  when he's done with work for the day

## 2018-04-11 NOTE — PLAN OF CARE
Problem: Patient Care Overview  Goal: Plan of Care/Patient Progress Review  OT: Evaluation and treatment completed POD#2 R MEKA, on precautions per surgical approach, pt doing well and planning return home tomorrow, no further OT needs at this time    Discharge Planner OT   Patient plan for discharge: return home with  assist   Current status: Pt able to complete lower body dressing tasks seated edge of bed following instructions for technique, pt educated on AE available, declined trial as will have  assist with socks until she is able to manage on her own since she does not have precautions in place; pt required assist for R sock only; pt able to complete toilet transfer with simulated home set-up, standard height toilet, RTS and FWW, SBA following cues and demonstrating safety; pt able to complete tub transfer following demonstration and verbal cues, SBA, FWW  Barriers to return to prior living situation: none anticipated  Recommendations for discharge: return home with  assist for IADLs   Rationale for recommendations: no further OT needs at this time       Entered by: Aundrea Merchant 04/11/2018 12:31 PM       Occupational Therapy Discharge Summary    Reason for therapy discharge:    Discharged to home.    Progress towards therapy goal(s). See goals on Care Plan in [facility] electronic health record for goal details.  Goals met    Therapy recommendation(s):    No further therapy is recommended.

## 2018-04-11 NOTE — PROGRESS NOTES
[facility]  Hospitalist Progress Note  Name: Delilah Miranda    MRN: [id number]  Provider:  Corey Floyd MD  04/11/18    Initial presenting complaint/issue to hospital (Diagnosis): s/p R MEKA.         Assessment and Plan:      Summary of Stay: Delilah Miranda is a 57 year old female admitted on 4/9/2018 with s/p R MEKA POD #2.       Problem List:      1. S/p R MEKA POD #2.  - cares and DVT ppx per ortho.     2. HLP.  - On lipitor.     3. SHIRA.  - CPAP qhs.     4. OAB.  - On detrol LA.         # Pain Assessment:  Current Pain Score 4/11/2018   Patient currently in pain? yes   Pain score (0-10) 3   Pain location -   Pain descriptors -   - Delilah is experiencing pain due to R hip pain. Pain management was discussed and the plan was created in a collaborative fashion.  Delilah's response to the current recommendations: engaged  - oxycodone, dilaudid.    DVT Prophylaxis:  -  lovenox.  Code Status: Full Code  Discharge Dispo: home.  Estimated Disch Date / # of Days until Discharge: per ortho          Interval History:        No chest pain/SOB/fever/chills/N/V/palpitations/dizziness.                  Physical Exam:      Last Vital Signs:  Temp: 96.3  F (35.7  C) Temp src: Oral BP: 120/62 Pulse: 110 Heart Rate: 108 Resp: 16 SpO2: 94 % O2 Device: None (Room air) Oxygen Delivery: 2 LPM    Intake/Output Summary (Last 24 hours) at 04/11/18 1121  Last data filed at 04/11/18 0446   Gross per 24 hour   Intake              883 ml   Output              400 ml   Net              483 ml     I/O last 3 completed shifts:  In: 883 [P.O.:880; I.V.:3]  Out: 500 [Urine:500]  Vitals:    04/09/18 0537   Weight: 83.5 kg (184 lb)     Gen - AAO x 3 in NAD.  Lungs - CTA B.  Heart - RR,S1+S2 nml, no m/g/r.  Abd - soft, NT, ND, + BS.  Ext - no edema           Medications:      All current medications were reviewed.         Data:      All new lab and imaging data was reviewed.   Labs:    Recent Labs  Lab 04/11/18  0754 04/10/18  0730    HGB 8.0* 8.7*       Recent Labs  Lab 04/11/18  0754 04/10/18  0730   * 148*      Recent Imaging:   No results found for this or any previous visit (from the past 24 hour(s)).

## 2018-04-11 NOTE — PROGRESS NOTES
04/11/18 1133   Quick Adds   Type of Visit Initial Occupational Therapy Evaluation   Living Environment   Lives With spouse   Living Arrangements house   Home Accessibility stairs to enter home;stairs within home   Number of Stairs to Enter Home 2   Number of Stairs Within Home 14   Transportation Available car;family or friend will provide   Living Environment Comment pt and  live together in  2 story home;  will be off work to assist as needed in the beginning of recovery   Self-Care   Dominant Hand right   Usual Activity Tolerance good   Current Activity Tolerance good   Regular Exercise no   Equipment Currently Used at Home cane, straight   Activity/Exercise/Self-Care Comment prior to surgery was walking dog   Functional Level Prior   Ambulation 1-->assistive equipment   Transferring 0-->independent   Toileting 1-->assistive equipment  (standard height, RTS, half wall)   Bathing 0-->independent  (tub shower, handheld showerhead & walk-in shower)   Dressing 2-->assistive person  (assists with socks)   Fall history within last six months yes   Prior Functional Level Comment independent at baseline with exception of  assisting with socks prior to surgery    General Information   Onset of Illness/Injury or Date of Surgery - Date 04/09/18   Referring Physician Nik SUÁREZ   Patient/Family Goals Statement return home    Additional Occupational Profile Info/Pertinent History of Current Problem POD#2 R MEKA, no precautions per surgical approach   Precautions/Limitations fall precautions   Weight-Bearing Status - RLE weight-bearing as tolerated   Cognitive Status Examination   Orientation orientation to person, place and time   Level of Consciousness alert   Able to Follow Commands WNL/WFL   Personal Safety (Cognitive) WNL/WFL   Cognitive Comment appropriate cognition during session   Pain Assessment   Patient Currently in Pain Yes, see Vital Sign flowsheet  (minimal management)   Mobility   Bed  Mobility Comments SBA   Transfer Skills   Transfer Comments FWW, SBA   Transfer Skill: Sit to Stand   Level of [place]: Sit/Stand stand-by assist   Physical Assist/Nonphysical Assist: Sit/Stand supervision   Transfer Skill: Sit to Stand weight-bearing as tolerated   Assistive Device for Transfer: Sit/Stand rolling walker   Transfer Skill: Toilet Transfer   Level of [place]: Toilet stand-by assist  (standard height toilet, RTS)   Physical Assist/Nonphysical Assist: Toilet supervision   Assistive Device rolling walker   Balance   Balance Comments impaired post-op, using FWW   Lower Body Dressing   Level of [place]: Dress Lower Body minimum assist (75% patients effort)   Physical Assist/Nonphysical Assist: Dress Lower Body supervision;verbal cues   Toileting   Level of [place]: Toilet stand-by assist   Physical Assist/Nonphysical Assist: Toilet supervision   Instrumental Activities of Daily Living (IADL)   Previous Responsibilities driving;work   Activities of Daily Living Analysis   Impairments Contributing to Impaired Activities of Daily Living balance impaired;pain;strength decreased   General Therapy Interventions   Planned Therapy Interventions ADL retraining;IADL retraining;transfer training   Clinical Impression   Criteria for Skilled Therapeutic Interventions Met yes, treatment indicated   OT Diagnosis impaired ability to manage ADL/IADLs   Influenced by the following impairments post-op pain, impaired activity tolerance   Assessment of Occupational Performance 1-3 Performance Deficits   Identified Performance Deficits impaired ability to manage dressing, bathing tasks   Clinical Decision Making (Complexity) Low complexity   Therapy Frequency daily  (one time eval/treat)   Predicted Duration of Therapy Intervention (days/wks) 1 day   Anticipated Discharge Disposition Home with Assist   Risks and Benefits of Treatment have been explained. Yes   Patient, Family & other staff in agreement with  "plan of care Yes   Clinical Impression Comments pt demonstrates ability to benefit from skilled OT services to improve level of independence and safety   [facility] AM-PAC TM \"6 Clicks\"   2016, Trustees of [facility], under license to [employer].  All rights reserved.   6 Clicks Short Forms Daily Activity Inpatient Short Form   [facility] AM-PAC  \"6 Clicks\" Daily Activity Inpatient Short Form   1. Putting on and taking off regular lower body clothing? 3 - A Little   2. Bathing (including washing, rinsing, drying)? 3 - A Little   3. Toileting, which includes using toilet, bedpan or urinal? 4 - None   4. Putting on and taking off regular upper body clothing? 4 - None   5. Taking care of personal grooming such as brushing teeth? 4 - None   6. Eating meals? 4 - None   Daily Activity Raw Score (Score out of 24.Lower scores equate to lower levels of function) 22   Total Evaluation Time   Total Evaluation Time (Minutes) 8     "

## 2018-04-11 NOTE — PLAN OF CARE
Problem: Patient Care Overview  Goal: Plan of Care/Patient Progress Review  Discharge Planner PT   Patient plan for discharge: home tomorrow  Current status: S to Mod I with basic transfers and gait with RW to 125 feet  Barriers to return to prior living situation: none anticipated  Recommendations for discharge: PT- Per plan established by the Physical Therapist, according to functional mobility the discharge recommendation is home with spouse assist    Rationale for recommendations: will met PT goals in next 1-2 sessions.       Entered by: Kristyn Christensen 04/11/2018 10:00 AM

## 2018-04-11 NOTE — PROGRESS NOTES
"Orthopedic Surgery  4/11/2018  POD#: 2    S: Patient voices no complaints today. Pain managed well; no dizziness, SOB, calf pain.    O: Blood pressure 120/62, pulse 110, temperature 96.3  F (35.7  C), temperature source Oral, resp. rate 16, height 1.575 m (5' 2\"), weight 83.5 kg (184 lb), last menstrual period 04/25/2007, SpO2 94 %, not currently breastfeeding.  Lab Results   Component Value Date    HGB 8.0 04/11/2018     No results found for: INR     I/O last 3 completed shifts:  In: 883 [P.O.:880; I.V.:3]  Out: 500 [Urine:500]    Neurovascularly intact.  Calves are negative bilaterally, both soft and nontender.  The wound is C/D/I.  The wound looks good with minimal erythema of the surrounding skin.    A: Ms. Miranda is doing well status post Procedure(s):  R MEKA    P:   1. Mobilize and continue physical therapy. No hip precautions.  2. Anticoagulation - dc on ASA  3. Pain management - controlled  4. Anticipate discharge to home tomorrow.      Darlyn Justin PA-C  O: [phone]    "

## 2018-04-11 NOTE — PLAN OF CARE
Problem: Patient Care Overview  Goal: Plan of Care/Patient Progress Review  Outcome: Improving  Wore CPAP overnight with O2 2 LPM. CMS intact. Dressing C/D/I. Tolerating PO well. Pain managed by PRN oxycodone and vistaril and scheduled tylenol. Standby assist with walker. Voiding adequate amounts. Plan to discharge home on Thursday.

## 2018-04-12 ENCOUNTER — APPOINTMENT (OUTPATIENT)
Dept: PHYSICAL THERAPY | Facility: CLINIC | Age: 58
DRG: 470 | End: 2018-04-12
Attending: ORTHOPAEDIC SURGERY
Payer: COMMERCIAL

## 2018-04-12 VITALS
TEMPERATURE: 97.1 F | RESPIRATION RATE: 16 BRPM | DIASTOLIC BLOOD PRESSURE: 58 MMHG | WEIGHT: 184 LBS | HEIGHT: 62 IN | BODY MASS INDEX: 33.86 KG/M2 | HEART RATE: 102 BPM | SYSTOLIC BLOOD PRESSURE: 107 MMHG | OXYGEN SATURATION: 96 %

## 2018-04-12 LAB
ABO + RH BLD: NORMAL
ABO + RH BLD: NORMAL
BLD GP AB SCN SERPL QL: NORMAL
BLOOD BANK CMNT PATIENT-IMP: NORMAL
HGB BLD-MCNC: 7.2 G/DL (ref 11.7–15.7)
HGB BLD-MCNC: 7.6 G/DL (ref 11.7–15.7)
PLATELET # BLD AUTO: 200 10E9/L (ref 150–450)
SPECIMEN EXP DATE BLD: NORMAL

## 2018-04-12 PROCEDURE — 85018 HEMOGLOBIN: CPT | Performed by: INTERNAL MEDICINE

## 2018-04-12 PROCEDURE — 36415 COLL VENOUS BLD VENIPUNCTURE: CPT | Performed by: INTERNAL MEDICINE

## 2018-04-12 PROCEDURE — 25000132 ZZH RX MED GY IP 250 OP 250 PS 637: Performed by: ORTHOPAEDIC SURGERY

## 2018-04-12 PROCEDURE — 85018 HEMOGLOBIN: CPT | Performed by: ORTHOPAEDIC SURGERY

## 2018-04-12 PROCEDURE — 97116 GAIT TRAINING THERAPY: CPT | Mod: GP | Performed by: PHYSICAL THERAPY ASSISTANT

## 2018-04-12 PROCEDURE — 25000128 H RX IP 250 OP 636: Performed by: ORTHOPAEDIC SURGERY

## 2018-04-12 PROCEDURE — 40000193 ZZH STATISTIC PT WARD VISIT: Performed by: PHYSICAL THERAPY ASSISTANT

## 2018-04-12 PROCEDURE — 86901 BLOOD TYPING SEROLOGIC RH(D): CPT | Performed by: INTERNAL MEDICINE

## 2018-04-12 PROCEDURE — 86850 RBC ANTIBODY SCREEN: CPT | Performed by: INTERNAL MEDICINE

## 2018-04-12 PROCEDURE — 85049 AUTOMATED PLATELET COUNT: CPT | Performed by: ORTHOPAEDIC SURGERY

## 2018-04-12 PROCEDURE — 99207 ZZC CDG-MDM COMPONENT: MEETS LOW - DOWN CODED: CPT | Performed by: INTERNAL MEDICINE

## 2018-04-12 PROCEDURE — 97110 THERAPEUTIC EXERCISES: CPT | Mod: GP | Performed by: PHYSICAL THERAPY ASSISTANT

## 2018-04-12 PROCEDURE — 36415 COLL VENOUS BLD VENIPUNCTURE: CPT | Performed by: ORTHOPAEDIC SURGERY

## 2018-04-12 PROCEDURE — 99231 SBSQ HOSP IP/OBS SF/LOW 25: CPT | Performed by: INTERNAL MEDICINE

## 2018-04-12 PROCEDURE — 86900 BLOOD TYPING SEROLOGIC ABO: CPT | Performed by: INTERNAL MEDICINE

## 2018-04-12 RX ORDER — FERROUS SULFATE 325(65) MG
325 TABLET ORAL
Qty: 14 TABLET | Refills: 0 | Status: SHIPPED | OUTPATIENT
Start: 2018-04-12 | End: 2018-07-12

## 2018-04-12 RX ADMIN — OXYCODONE HYDROCHLORIDE 10 MG: 5 TABLET ORAL at 03:58

## 2018-04-12 RX ADMIN — MAGNESIUM OXIDE TAB 400 MG (241.3 MG ELEMENTAL MG) 400 MG: 400 (241.3 MG) TAB at 08:18

## 2018-04-12 RX ADMIN — SENNOSIDES AND DOCUSATE SODIUM 2 TABLET: 8.6; 5 TABLET ORAL at 08:17

## 2018-04-12 RX ADMIN — ACETAMINOPHEN 975 MG: 325 TABLET, FILM COATED ORAL at 08:16

## 2018-04-12 RX ADMIN — RANITIDINE 150 MG: 150 TABLET ORAL at 08:17

## 2018-04-12 RX ADMIN — ATORVASTATIN CALCIUM 40 MG: 40 TABLET, FILM COATED ORAL at 08:19

## 2018-04-12 RX ADMIN — TOLTERODINE TARTRATE 2 MG: 2 CAPSULE, EXTENDED RELEASE ORAL at 08:18

## 2018-04-12 RX ADMIN — OXYCODONE HYDROCHLORIDE 10 MG: 5 TABLET ORAL at 08:19

## 2018-04-12 RX ADMIN — SERTRALINE HYDROCHLORIDE 150 MG: 100 TABLET ORAL at 08:17

## 2018-04-12 RX ADMIN — OXYCODONE HYDROCHLORIDE 5 MG: 5 TABLET ORAL at 12:07

## 2018-04-12 RX ADMIN — CHOLECALCIFEROL CAP 125 MCG (5000 UNIT) 5000 UNITS: 125 CAP at 08:18

## 2018-04-12 RX ADMIN — ENOXAPARIN SODIUM 40 MG: 40 INJECTION SUBCUTANEOUS at 08:16

## 2018-04-12 RX ADMIN — HYDROXYZINE HYDROCHLORIDE 25 MG: 25 TABLET ORAL at 08:19

## 2018-04-12 NOTE — PROVIDER NOTIFICATION
Called and left message with Charlene (care coordinator) for Darlyn or Dr Zaragoza re: 7.2 Hgb. Pt asymptomatic except for tachy heart rate of 107.

## 2018-04-12 NOTE — PLAN OF CARE
Problem: Patient Care Overview  Goal: Plan of Care/Patient Progress Review  Outcome: Improving  Wore CPAP overnight. Standby assist. CMS intact. Dressing C/D/I. Pain managed with PO oxycodone and cold application. IV removed d/t occlusion. Plan to discharge home today.

## 2018-04-12 NOTE — DISCHARGE INSTRUCTIONS
Return to clinic in 10-14 days. Call [phone] to schedule or if you experience any problems before your scheduled appointment.      See general discharge instruction sheet for hips.  1. Do exercises as instructed by therapist.  2. Observe your hip precautions.  3. Walk daily increasing distance and time each day by a little.  4. Ice hip for swelling and discomfort.  5. May shower, inspect dressing daily for problems listed below   6.Notify your dentist or physician of your implant so you can get antibiotics before any dental work or any other invasive procedure (ie: colonoscopy).  7. Do not cross legs.      Aquacel dressing:  DO NOT CHANGE DRESSING DAILY.  Leave this dressing on until follow-up appointment with Orthopedic Surgeon.  Dressing is waterproof, can shower with it on, pat dry when done.  No soaking such as in tub baths, pools or hot tubs        While on narcotic pain medication, to prevent constipation:  1. Drink plenty of water to keep well hydrated   2. May take over the counter Colace or Senna (follow instructions on label)       Call your physician if: ([phone])   1. Persistent fever greater than 100 degrees with body chills or excessive sweating.  2. Increased/persistent redness, localized warmth, tenderness, drainage or swelling at incision site. Greater than 50% drainage on dressing.   3. Pain not controlled with oral pain medications, ice and rest.   4. No bowel movement in 3 days (may use Milk of Magnesia or other over the counter remedy).  5. Chest pain, shortness of breath, and/or calf pain with excessive swelling.  6. Generalized feeling of illness.  7. Any other questions or concerns related to your surgery/recovery.        Thank you for allowing [facility] to participate in your cares!!

## 2018-04-12 NOTE — PROGRESS NOTES
Reviewed discharge instructions and medications with patient and . Questions answered. Patient discharged to home with , discharge instructions, medications (Percocet, ASA, Iron), and belongings.

## 2018-04-12 NOTE — PLAN OF CARE
Problem: Patient Care Overview  Goal: Plan of Care/Patient Progress Review  Discharge Planner PT   Patient plan for discharge: home but unsure if will go today due to Hgb issues  Current status: goals are all met did walk with SEC also as will progress quickly once home  Barriers to return to prior living situation: none  Recommendations for discharge: PT- Per plan established by the Physical Therapist, according to functional mobility the discharge recommendation is home with spouse assist    Rationale for recommendations: goals are met, will await MD clearance for discharge, if does not discharge will seek new goals from PT for standing ex and gait with SEC       Entered by: Kristyn Christensen 04/12/2018 9:19 AM         PT- will now discharge per MD orders see above for details

## 2018-04-12 NOTE — PROGRESS NOTES
[facility]  Hospitalist Progress Note  Name: Delilah Miranda    MRN: [id number]  Provider:  Corey Floyd MD  04/12/18    Initial presenting complaint/issue to hospital (Diagnosis): s/p R MEKA.         Assessment and Plan:      Summary of Stay: Delilah Miranda is a 57 year old female admitted on 4/9/2018 with  s/p R MEKA POD #3.        Problem List:       1. S/p R MEKA POD #3.  - cares and DVT ppx per ortho.      2. HLP.  - On lipitor.      3. SHIRA.  - CPAP qhs.      4. OAB.  - On detrol LA.      5. ABL.  - On oral iron           # Pain Assessment:  Current Pain Score 4/12/2018   Patient currently in pain? yes   Pain score (0-10) 3   Pain location Hip   Pain descriptors -   - Delilah is experiencing pain due to R MEKA.. Pain management was discussed and the plan was created in a collaborative fashion.  Delilah's response to the current recommendations: engaged  - per ortho                  Interval History:        No chest pain/SOB/N/V/lightheadedness/dizziness.                  Physical Exam:      Last Vital Signs:  Temp: 97.1  F (36.2  C) Temp src: Oral BP: 107/58 Pulse: 102 Heart Rate: 107 Resp: 16 SpO2: 96 % O2 Device: None (Room air)    No intake or output data in the 24 hours ending 04/12/18 1223     Vitals:    04/09/18 0537   Weight: 83.5 kg (184 lb)     Gen - AAO x 3 in NAD.  Lungs - CTA B.  Heart - RR,S1+S2 nml, no m/g/r.  Abd - soft, NT, ND, + BS.  Ext - no edema           Medications:      All current medications were reviewed.         Data:      All new lab and imaging data was reviewed.   Labs:    Recent Labs  Lab 04/12/18  1158 04/12/18  0642 04/11/18  0754   HGB 7.6* 7.2* 8.0*   PLT  --  200  --        Recent Labs  Lab 04/11/18  0754 04/10/18  0730   * 148*      Recent Imaging:   No results found for this or any previous visit (from the past 24 hour(s)).

## 2018-04-12 NOTE — PROGRESS NOTES
"Orthopedic Surgery  4/12/2018  POD#: 3    S: Patient voices no complaints today. Pain well controlled, denies calf pain, dizziness, SOB.    O: Blood pressure 107/58, pulse 102, temperature 97.1  F (36.2  C), resp. rate 16, height 1.575 m (5' 2\"), weight 83.5 kg (184 lb), last menstrual period 04/25/2007, SpO2 96 %, not currently breastfeeding.  Lab Results   Component Value Date    HGB 7.2 04/12/2018     No results found for: INR      Neurovascularly intact.  Calves are negative bilaterally, both soft and nontender.  The wound is C/D/I.  The wound looks good with minimal erythema of the surrounding skin.    A: Ms. Miranda is doing well status post Procedure(s):  R MEKA    P:   1. Mobilize and continue physical therapy. No precautions.  2. Anticoagulation - dc on ASA  3. Post-op ERICK - Hgb trending down, 7.2 this morning.  Recheck at 12pm and transfuse 1 unit if <7.  Otherwise, will plan to discharge with oral Iron and recommend f/u with PCP in 1 week for recheck.  4. Pain management - controlled  5. Anticipate discharge to home later today.    Darlyn Justin PA-C  O: [phone]    "

## 2018-04-12 NOTE — PLAN OF CARE
Problem: Patient Care Overview  Goal: Plan of Care/Patient Progress Review  Physical Therapy Discharge Summary    Reason for therapy discharge:    All goals and outcomes met, no further needs identified.    Progress towards therapy goal(s). See goals on Care Plan in [facility] electronic health record for goal details.  Goals met    Therapy recommendation(s):    Continue home exercise program.

## 2018-04-13 ENCOUNTER — TELEPHONE (OUTPATIENT)
Dept: FAMILY MEDICINE | Facility: CLINIC | Age: 58
End: 2018-04-13

## 2018-04-13 NOTE — TELEPHONE ENCOUNTER
"Hospital/TCU/ED for chronic condition Discharge Protocol    \"Hi, my name is Rfaita Barnes, a registered nurse, and I am calling from [facility].  I am calling to follow up and see how things are going for you after your recent hospital stay.\"   Total hip    Tell me how you are doing now that you are home?\" quite well.  Reports hgb dropped during hospital stay but did rebound a bit so did not need transfusion. Sent home on PO iron 1 QD X 14 D with instructions to recheck in one week.      Discharge Instructions    \"Let's review your discharge instructions.  What is/are the follow-up recommendations?  Pt. Response: recheck hgb in one week.  No orders placed.    Started iron supplement today    \"Has an appointment with your primary care provider been scheduled?\" No.    Scheduled with PCP in one week.  Added check hgb to appointment note.      Medications    \"Tell me what changed about your medicines when you discharged?\"    Changes to chronic meds?    0-1    \"What questions do you have about your medications?\"    None     New diagnoses of heart failure, COPD, diabetes, or MI?    No              Medication reconciliation completed? Yes  Was MTM referral placed (*Make sure to put transitions as reason for referral)?   No    Call Summary    \"What questions or concerns do you have about your recent visit and your follow-up care?\"     none  Call PCP if needed.    \"Thank you for your time and take care!\"             "

## 2018-04-17 NOTE — DISCHARGE SUMMARY
"Discharge Summary    Delilah Miranda MRN# [id number]   YOB: 1960 Age: 57 year old     Date of Admission: 4/9/2018    Date of Discharge: 4/12/2018    Reason for Admission: Delilah Miranda is an 57 year old female who was admitted to the hospital following surgery with Dr. Bryon Zaragoza.    Preoperative Diagnosis: right degenerative joint disease    Postoperative Diagnosis: right degenerative joint disease    Procedure Completed: right total hip arthroplasty     Hospital Course:  Ms. Miranda was admitted and underwent the above procedure. The patient tolerated the procedure well. There were no complications. Following surgery she was admitted to the floor.  During her stay she did not require any blood transfusions.  Her last hemoglobin was noted to be   Lab Results   Component Value Date    HGB 7.6 04/12/2018   .  Her pain was controlled with oral pain medication.  During her stay she progressed well in physical therapy and all the therapy goals were met.     Discharge Physical Exam:  /58  Pulse 102  Temp 97.1  F (36.2  C)  Resp 16  Ht 1.575 m (5' 2\")  Wt 83.5 kg (184 lb)  LMP 04/25/2007  SpO2 96%  BMI 33.65 kg/m2  Neurovascularly intact, distal pulses present bilaterally.  Calves are negative bilaterally, both soft and nontender.  The wound looks good with minimal erythema of the surrounding skin.    Assessment: Ms. Miranda is stable and doing well status post right total hip arthroplasty on POD #3.    Plan: We will discharge her home on analgesics and deep venous thrombosis prophylaxis.  She will follow-up with Darlyn Justin PA-C or Dr. Bryon Zaragoza approximately 2 weeks from surgery.  She may call [phone] to schedule an appointment.  She should also follow up with her PCP in 1 week to recheck her hemoglobin.    Discharge Instructions:  Discharge diet: Regular   Discharge activity: Weight bear as tolerated.  Advance from the walker to a cane as tolerated.  Discontinue the use of " cane when comfortable.   Physical therapy: Work on therapy exercises given in the hospital.    Discharge follow-up: Follow up with Darlyn Justin PA-C in 10-14 days.   Anticoagulation Asprin 325 mg twice daily for 5 weeks.   Wound care: Leave aquacel dressing in place until post-op follow-up.  If it becomes loose or soiled then remove and replace with daily dry dressings.  Keep wound clean and dry.  May get incision area wet in shower but do not soak or scrub.         Meds:   Delilah Miranda   Home Medication Instructions JARROD:[id number]    Printed on:04/17/18 1200   Medication Information                      albuterol (ALBUTEROL) 108 (90 BASE) MCG/ACT inhaler  Inhale 2 puffs into the lungs every 6 hours as needed INHALE 1 TO 2 PUFFS EVERY 4 TO 6 HOURS AS NEEDED             alpha-lipoic acid 600 MG CAPS  Take 1 capsule by mouth daily             amitriptyline (ELAVIL) 25 MG tablet  TAKE ONE TABLET BY MOUTH AT BEDTIME             aspirin  MG EC tablet  Take one Aspirin tab twice daily for 5 weeks.             atorvastatin (LIPITOR) 40 MG tablet  Take 1 tablet (40 mg) by mouth daily             Biotin 83663 MCG TABS  Take 1 tablet by mouth daily             cyclobenzaprine (FLEXERIL) 5 MG tablet  Take 5 mg by mouth 2 times daily as needed              ferrous sulfate (IRON) 325 (65 Fe) MG tablet  Take 1 tablet (325 mg) by mouth daily (with breakfast)             fluticasone (FLONASE) 50 MCG/ACT spray  Spray 1 spray into both nostrils daily as needed for rhinitis or allergies             hydrOXYzine (ATARAX) 25 MG tablet  Take 1-2 tablets (25-50 mg) by mouth every 6 hours as needed for anxiety             ibuprofen (ADVIL/MOTRIN) 800 MG tablet  800 mg every 6 hours as needed (Will stop 7 days pre op) prn             Magnesium ([employer] TRIPLE MAGNESIUM COMPLEX) 400 MG CAPS  Take 1 capsule by mouth daily             methocarbamol (ROBAXIN) 500 MG tablet  Take 2 tablets (1,000 mg) by mouth 3 times daily as needed for  muscle spasms             order for DME  Equipment being ordered: Walker Wheels () and Walker ()  Treatment Diagnosis: Impaired gait s/p MEKA.             oxyCODONE-acetaminophen (PERCOCET) 5-325 MG per tablet  Take 1-2 tablets by mouth every 4 hours as needed for moderate to severe pain             Sertraline HCl (ZOLOFT PO)  Take 150 mg by mouth daily             solifenacin (VESICARE) 5 MG tablet  Take 1 tablet (5 mg) by mouth daily             VITAMIN D, CHOLECALCIFEROL, PO  Take 5,000 Units by mouth daily                     Darlyn Justin PA-C  O: [phone]

## 2018-04-20 ENCOUNTER — OFFICE VISIT (OUTPATIENT)
Dept: FAMILY MEDICINE | Facility: CLINIC | Age: 58
End: 2018-04-20
Payer: COMMERCIAL

## 2018-04-20 VITALS
RESPIRATION RATE: 16 BRPM | SYSTOLIC BLOOD PRESSURE: 114 MMHG | TEMPERATURE: 98.2 F | WEIGHT: 183 LBS | BODY MASS INDEX: 33.47 KG/M2 | HEART RATE: 119 BPM | OXYGEN SATURATION: 99 % | DIASTOLIC BLOOD PRESSURE: 80 MMHG

## 2018-04-20 DIAGNOSIS — D64.9 LOW HEMOGLOBIN: Primary | ICD-10-CM

## 2018-04-20 DIAGNOSIS — F41.8 SITUATIONAL ANXIETY: ICD-10-CM

## 2018-04-20 DIAGNOSIS — Z96.641 S/P HIP REPLACEMENT, RIGHT: ICD-10-CM

## 2018-04-20 LAB
ERYTHROCYTE [DISTWIDTH] IN BLOOD BY AUTOMATED COUNT: 14.2 % (ref 10–15)
HCT VFR BLD AUTO: 35.3 % (ref 35–47)
HGB BLD-MCNC: 11.5 G/DL (ref 11.7–15.7)
MCH RBC QN AUTO: 30.2 PG (ref 26.5–33)
MCHC RBC AUTO-ENTMCNC: 32.6 G/DL (ref 31.5–36.5)
MCV RBC AUTO: 93 FL (ref 78–100)
PLATELET # BLD AUTO: 610 10E9/L (ref 150–450)
RBC # BLD AUTO: 3.81 10E12/L (ref 3.8–5.2)
WBC # BLD AUTO: 11.7 10E9/L (ref 4–11)

## 2018-04-20 PROCEDURE — 99495 TRANSJ CARE MGMT MOD F2F 14D: CPT | Performed by: PHYSICIAN ASSISTANT

## 2018-04-20 PROCEDURE — 85027 COMPLETE CBC AUTOMATED: CPT | Performed by: PHYSICIAN ASSISTANT

## 2018-04-20 PROCEDURE — 36415 COLL VENOUS BLD VENIPUNCTURE: CPT | Performed by: PHYSICIAN ASSISTANT

## 2018-04-20 RX ORDER — HYDROXYZINE HYDROCHLORIDE 25 MG/1
25-50 TABLET, FILM COATED ORAL EVERY 6 HOURS PRN
Qty: 90 TABLET | Refills: 1 | Status: ON HOLD | OUTPATIENT
Start: 2018-04-20 | End: 2018-07-31

## 2018-04-20 NOTE — PROGRESS NOTES
SUBJECTIVE:   Delilah Miranda is a 57 year old female who presents to clinic today for the following health issues:          Hospital Follow-up Visit: Right hip total replacement     Hospital/Nursing Home/IP Rehab Facility: [facility]  Date of Admission: 4/9/2018  Date of Discharge: 4/12/2018  Reason(s) for Admission: Hip Surgery             Problems taking medications regularly:  None       Medication changes since discharge: None       Problems adhering to non-medication therapy:  None    Summary of hospitalization:  [facility] discharge summary reviewed  Diagnostic Tests/Treatments reviewed.  Follow up needed: none  Other Healthcare Providers Involved in Patient s Care: Ortho           Update since discharge: fluctuating course.     Post Discharge Medication Reconciliation: discharge medications reconciled, continue medications without change.  Plan of care communicated with patient and family     Coding guidelines for this visit:  Type of Medical   Decision Making Face-to-Face Visit       within 7 Days of discharge Face-to-Face Visit        within 14 days of discharge   Moderate Complexity [id number]   High Complexity [id number]              Has not been using pain meds due to constipation.     Problem list and histories reviewed & adjusted, as indicated.  Additional history: as documented    Patient Active Problem List   Diagnosis     Allergic rhinitis     Thoracic or lumbosacral neuritis or radiculitis, unspecified     Mild intermittent asthma     Mild major depression (H)     OA (osteoarthritis) of knee     Hyperlipidemia LDL goal <160     Elevated fasting glucose     Vitamin D deficiency     Heartburn     Snoring     Urinary incontinence, nocturnal enuresis     Anxiety     Overactive bladder     Situational anxiety     S/P total hip arthroplasty     Past Surgical History:   Procedure Laterality Date     ARTHROPLASTY HIP Right 4/9/2018    Procedure: ARTHROPLASTY HIP;  Right total hip  arthroplasty using a Biomet G7 acetabulum and Taperloc femoral stem. ;  Surgeon: Bryon Zaragoza MD;  Location: [facility] OR     ARTHROSCOPY KNEE BILATERAL       C NONSPECIFIC PROCEDURE      meniscus      SECTION       excsion of lypoma      times 3       Social History   Substance Use Topics     Smoking status: Former Smoker     Packs/day: 0.50     Years: 5.00     Types: Cigarettes     Quit date: 3/29/1993     Smokeless tobacco: Never Used      Comment: smoked 1 pk qd in her twenties quit at age 26     Alcohol use 0.0 oz/week     0 Standard drinks or equivalent per week      Comment: 3 beers monthly     Family History   Problem Relation Age of Onset     CANCER Maternal Grandmother      CANCER Paternal Grandmother      CANCER Paternal Grandfather      HEART DISEASE Father      Hypertension Father      DIABETES Father      DIABETES Sister      DIABETES Maternal Grandfather            Reviewed and updated as needed this visit by clinical staff  Allergies       Reviewed and updated as needed this visit by Provider         ROS:  Constitutional, HEENT, cardiovascular, pulmonary, gi and gu systems are negative, except as otherwise noted.    OBJECTIVE:     /80 (BP Location: Right arm, Patient Position: Chair, Cuff Size: Adult Large)  Pulse 119  Temp 98.2  F (36.8  C) (Oral)  Resp 16  Wt 183 lb (83 kg)  LMP 2007  SpO2 99%  BMI 33.47 kg/m2  Body mass index is 33.47 kg/(m^2).  GENERAL APPEARANCE: healthy, alert and fatigued  RESP: lungs clear to auscultation - no rales, rhonchi or wheezes  CV: regular rates and rhythm, normal S1 S2, no S3 or S4 and no murmur, click or rub  SKIN: no suspicious lesions or rashes  PSYCH: mentation appears normal and anxious    Diagnostic Test Results:  Results for orders placed or performed in visit on 18 (from the past 24 hour(s))   CBC with platelets   Result Value Ref Range    WBC 11.7 (H) 4.0 - 11.0 10e9/L    RBC Count 3.81 3.8 - 5.2 10e12/L    Hemoglobin  11.5 (L) 11.7 - 15.7 g/dL    Hematocrit 35.3 35.0 - 47.0 %    MCV 93 78 - 100 fl    MCH 30.2 26.5 - 33.0 pg    MCHC 32.6 31.5 - 36.5 g/dL    RDW 14.2 10.0 - 15.0 %    Platelet Count 610 (H) 150 - 450 10e9/L       ASSESSMENT/PLAN:             1. Low hemoglobin  Hemoglobin okay today; however, pt is a little dehydrated today.  If increased SOB, fatigue, go to ER. The patient indicates understanding of these issues and agrees with the plan.    - CBC with platelets    2. Situational anxiety  Use as needed   - hydrOXYzine (ATARAX) 25 MG tablet; Take 1-2 tablets (25-50 mg) by mouth every 6 hours as needed for anxiety  Dispense: 90 tablet; Refill: 1    3. S/P hip replacement, right  Healing. F/u with ortho.  Encouraged ambulation and movement.           Roxana Cuadra PA-C  [facility]

## 2018-04-20 NOTE — MR AVS SNAPSHOT
After Visit Summary   4/20/2018    Delilah Miranda    MRN: [id number]           Patient Information     Date Of Birth          1960        Visit Information        Provider Department      4/20/2018 8:00 AM Roxana Cuadra PA-C [facility]         Follow-ups after your visit        Your next 10 appointments already scheduled     Apr 20, 2018  8:00 AM CDT   Office Visit with Roxana Cuadra PA-C   [facility] ([facility])    [address] [place] [id number] [phone]           Bring a current list of meds and any records pertaining to this visit. For Physicals, please bring immunization records and any forms needing to be filled out. Please arrive 10 minutes early to complete paperwork.              Who to contact     If you have questions or need follow up information about today's clinic visit or your schedule please contact [facility] directly at [phone].  Normal or non-critical lab and imaging results will be communicated to you by XIPWIREhart, letter or phone within 4 business days after the clinic has received the results. If you do not hear from us within 7 days, please contact the clinic through [employer] or phone. If you have a critical or abnormal lab result, we will notify you by phone as soon as possible.  Submit refill requests through [employer] or call your pharmacy and they will forward the refill request to us. Please allow 3 business days for your refill to be completed.          Additional Information About Your Visit        MyChart Information     [employer] gives you secure access to your electronic health record. If you see a primary care provider, you can also send messages to your care team and make appointments. If you have questions, please call your primary care clinic.  If you do not have a primary care provider, please call [phone] and they will assist you.        Care  EveryWhere ID     This is your Care EveryWhere ID. This could be used by other organizations to access your [place] medical records  [id number]        Your Vitals Were     Pulse Temperature Respirations Last Period Pulse Oximetry BMI (Body Mass Index)    119 98.2  F (36.8  C) (Oral) 16 04/25/2007 99% 33.47 kg/m2       Blood Pressure from Last 3 Encounters:   04/20/18 114/80   04/12/18 107/58   03/29/18 131/85    Weight from Last 3 Encounters:   04/20/18 183 lb (83 kg)   04/09/18 184 lb (83.5 kg)   03/29/18 183 lb (83 kg)              Today, you had the following     No orders found for display       Primary Care Provider Office Phone # Fax #    Roxana Cuadra PA-C [phone] [address] [facility] [address]        Equal Access to Services     [facility] : Hadii aad ku hadasho Soian, waaxda luqadaha, qaybta kaalmada adeegyada, mikala main haypatricia weber . So [facility] [phone].    ATENCIÓN: Si habla español, tiene a rodriguez disposición servicios gratuitos de asistencia lingüística. Llame al [phone].    We comply with applicable federal civil rights laws and [place] laws. We do not discriminate on the basis of race, color, national origin, age, disability, sex, sexual orientation, or gender identity.            Thank you!     Thank you for choosing [facility]  for your care. Our goal is always to provide you with excellent care. Hearing back from our patients is one way we can continue to improve our services. Please take a few minutes to complete the written survey that you may receive in the mail after your visit with us. Thank you!             Your Updated Medication List - Protect others around you: Learn how to safely use, store and throw away your medicines at www.disposemymeds.org.          This list is accurate as of 4/20/18  7:56 AM.  Always use your most recent med list.                   Brand Name Dispense Instructions for use  Diagnosis    albuterol 108 (90 Base) MCG/ACT Inhaler    PROAIR HFA    2 Inhaler    Inhale 2 puffs into the lungs every 6 hours as needed INHALE 1 TO 2 PUFFS EVERY 4 TO 6 HOURS AS NEEDED    Mild intermittent asthma       alpha-lipoic acid 600 MG Caps      Take 1 capsule by mouth daily        amitriptyline 25 MG tablet    ELAVIL    90 tablet    TAKE ONE TABLET BY MOUTH AT BEDTIME    Psychophysiological insomnia       aspirin 325 MG EC tablet     70 tablet    Take one Aspirin tab twice daily for 5 weeks.    Status post total replacement of right hip       atorvastatin 40 MG tablet    LIPITOR    90 tablet    Take 1 tablet (40 mg) by mouth daily    Hyperlipidemia LDL goal <130       Biotin 54162 MCG Tabs      Take 1 tablet by mouth daily        [employer] TRIPLE MAGNESIUM COMPLEX 400 MG Caps   Generic drug:  Magnesium      Take 1 capsule by mouth daily        cyclobenzaprine 5 MG tablet    FLEXERIL     Take 5 mg by mouth 2 times daily as needed        ferrous sulfate 325 (65 Fe) MG tablet    IRON    14 tablet    Take 1 tablet (325 mg) by mouth daily (with breakfast)    Status post total replacement of right hip       fluticasone 50 MCG/ACT spray    FLONASE     Spray 1 spray into both nostrils daily as needed for rhinitis or allergies        hydrOXYzine 25 MG tablet    ATARAX    90 tablet    Take 1-2 tablets (25-50 mg) by mouth every 6 hours as needed for anxiety    Situational anxiety       ibuprofen 800 MG tablet    ADVIL/MOTRIN     800 mg every 6 hours as needed (Will stop 7 days pre op) prn        methocarbamol 500 MG tablet    ROBAXIN    90 tablet    Take 2 tablets (1,000 mg) by mouth 3 times daily as needed for muscle spasms    Muscle spasm       order for DME     1 each    Equipment being ordered: Walker Wheels () and Walker () Treatment Diagnosis: Impaired gait s/p MEKA.    Status post total replacement of right hip       oxyCODONE-acetaminophen 5-325 MG per tablet    PERCOCET    75 tablet    Take 1-2 tablets by  mouth every 4 hours as needed for moderate to severe pain    Status post total replacement of right hip       solifenacin 5 MG tablet    VESICARE    90 tablet    Take 1 tablet (5 mg) by mouth daily    Overactive bladder       VITAMIN D (CHOLECALCIFEROL) PO      Take 5,000 Units by mouth daily        ZOLOFT PO      Take 150 mg by mouth daily

## 2018-04-23 ENCOUNTER — TRANSFERRED RECORDS (OUTPATIENT)
Dept: HEALTH INFORMATION MANAGEMENT | Facility: CLINIC | Age: 58
End: 2018-04-23

## 2018-05-21 ENCOUNTER — MYC MEDICAL ADVICE (OUTPATIENT)
Dept: FAMILY MEDICINE | Facility: CLINIC | Age: 58
End: 2018-05-21

## 2018-05-21 DIAGNOSIS — Z29.89 NEED FOR SBE (SUBACUTE BACTERIAL ENDOCARDITIS) PROPHYLAXIS: Primary | ICD-10-CM

## 2018-05-22 RX ORDER — AMOXICILLIN 500 MG/1
2000 CAPSULE ORAL ONCE
Qty: 4 CAPSULE | Refills: 0 | Status: SHIPPED | OUTPATIENT
Start: 2018-05-22 | End: 2018-05-22

## 2018-05-25 ENCOUNTER — TRANSFERRED RECORDS (OUTPATIENT)
Dept: HEALTH INFORMATION MANAGEMENT | Facility: CLINIC | Age: 58
End: 2018-05-25

## 2018-06-07 ENCOUNTER — MYC MEDICAL ADVICE (OUTPATIENT)
Dept: FAMILY MEDICINE | Facility: CLINIC | Age: 58
End: 2018-06-07

## 2018-06-07 DIAGNOSIS — Z29.8 * * * SBE PROPHYLAXIS * * *: Primary | ICD-10-CM

## 2018-06-07 DIAGNOSIS — F41.9 ANXIETY: ICD-10-CM

## 2018-06-07 DIAGNOSIS — N32.81 OVERACTIVE BLADDER: ICD-10-CM

## 2018-06-08 RX ORDER — AMOXICILLIN 500 MG/1
2000 CAPSULE ORAL ONCE
Qty: 4 CAPSULE | Refills: 0 | Status: SHIPPED | OUTPATIENT
Start: 2018-06-08 | End: 2018-09-24

## 2018-06-08 NOTE — TELEPHONE ENCOUNTER
"Requested Prescriptions   Pending Prescriptions Disp Refills     sertraline (ZOLOFT) 100 MG tablet [Pharmacy Med Name: SERTRALINE HCL 100MG TABS] 135 tablet 1     (Historical)        Last Office Visit with G, P or [facility] prescribing provider: 4/20/2018  Future Office Visit:      Sig: TAKE ONE AND ONE-HALF TABLETS  BY MOUTH ONCE DAILY    SSRIs Protocol Passed    PHQ-9 SCORE 11/11/2016 3/21/2017 1/10/2018   Total Score - - -   Total Score MyChart - - 5 (Mild depression)   Total Score 0 15 5     ELENITA-7 SCORE 6/8/2016 11/11/2016 3/21/2017   Total Score - - -   Total Score 14 2 18            Passed - Recent (12 mo) or future (30 days) visit within the authorizing provider's specialty    Patient had office visit in the last 12 months or has a visit in the next 30 days with authorizing provider or within the authorizing provider's specialty.  See \"Patient Info\" tab in inbasket, or \"Choose Columns\" in Meds & Orders section of the refill encounter.           Passed - Patient is age 18 or older       Passed - No active pregnancy on record       Passed - No positive pregnancy test in last 12 months   _________________________________________________________________________________________________________________________       VESICARE 5 MG tablet [Pharmacy Med Name: VESICARE 5MG TABS] 90 tablet 1    Last Written Prescription Date:  12/5/17  Last Fill Quantity: 90,  # refills: 1   Last Office Visit: 4/20/2018   Future Office Visit:      Sig: TAKE ONE TABLET BY MOUTH EVERY DAY    Muscarinic Antagonists (Urinary Incontinence Agents) Passed    6/7/2018  7:19 PM       Passed - Recent (12 mo) or future (30 days) visit within the authorizing provider's specialty    Patient had office visit in the last 12 months or has a visit in the next 30 days with authorizing provider or within the authorizing provider's specialty.  See \"Patient Info\" tab in inbasket, or \"Choose Columns\" in Meds & Orders section of the refill encounter.     "       Passed - Patient does not have a diagnosis of glaucoma on the problem list    If glaucoma diagnosis is new, refer refill to physician.         Passed - Patient is 18 years of age or older

## 2018-06-11 RX ORDER — SOLIFENACIN SUCCINATE 5 MG/1
TABLET, FILM COATED ORAL
Qty: 90 TABLET | Refills: 2 | Status: SHIPPED | OUTPATIENT
Start: 2018-06-11 | End: 2019-03-11

## 2018-06-11 RX ORDER — SERTRALINE HYDROCHLORIDE 100 MG/1
TABLET, FILM COATED ORAL
Qty: 135 TABLET | Refills: 1 | Status: SHIPPED | OUTPATIENT
Start: 2018-06-11 | End: 2018-11-06

## 2018-06-11 NOTE — TELEPHONE ENCOUNTER
Routing refill request to provider for review/approval because:  Medication is reported/historical  PHQ>4  Francisca Lynch RN, BSN

## 2018-07-06 ENCOUNTER — OFFICE VISIT (OUTPATIENT)
Dept: FAMILY MEDICINE | Facility: CLINIC | Age: 58
End: 2018-07-06
Payer: COMMERCIAL

## 2018-07-06 VITALS
WEIGHT: 183 LBS | OXYGEN SATURATION: 96 % | DIASTOLIC BLOOD PRESSURE: 75 MMHG | HEART RATE: 95 BPM | BODY MASS INDEX: 33.47 KG/M2 | SYSTOLIC BLOOD PRESSURE: 119 MMHG | RESPIRATION RATE: 16 BRPM | TEMPERATURE: 98.1 F

## 2018-07-06 DIAGNOSIS — Z01.818 PREOP GENERAL PHYSICAL EXAM: Primary | ICD-10-CM

## 2018-07-06 DIAGNOSIS — G47.33 OSA (OBSTRUCTIVE SLEEP APNEA): ICD-10-CM

## 2018-07-06 LAB
ANION GAP SERPL CALCULATED.3IONS-SCNC: 8 MMOL/L (ref 3–14)
BUN SERPL-MCNC: 11 MG/DL (ref 7–30)
CALCIUM SERPL-MCNC: 9.3 MG/DL (ref 8.5–10.1)
CHLORIDE SERPL-SCNC: 107 MMOL/L (ref 94–109)
CO2 SERPL-SCNC: 25 MMOL/L (ref 20–32)
CREAT SERPL-MCNC: 0.62 MG/DL (ref 0.52–1.04)
ERYTHROCYTE [DISTWIDTH] IN BLOOD BY AUTOMATED COUNT: 13.3 % (ref 10–15)
GFR SERPL CREATININE-BSD FRML MDRD: >90 ML/MIN/1.7M2
GLUCOSE SERPL-MCNC: 134 MG/DL (ref 70–99)
HBA1C MFR BLD: 6.3 % (ref 0–5.6)
HCT VFR BLD AUTO: 38.4 % (ref 35–47)
HGB BLD-MCNC: 12.6 G/DL (ref 11.7–15.7)
MCH RBC QN AUTO: 27.8 PG (ref 26.5–33)
MCHC RBC AUTO-ENTMCNC: 32.8 G/DL (ref 31.5–36.5)
MCV RBC AUTO: 85 FL (ref 78–100)
MRSA DNA SPEC QL NAA+PROBE: NEGATIVE
PLATELET # BLD AUTO: 284 10E9/L (ref 150–450)
POTASSIUM SERPL-SCNC: 4.1 MMOL/L (ref 3.4–5.3)
RBC # BLD AUTO: 4.53 10E12/L (ref 3.8–5.2)
RETICS # AUTO: 53 10E9/L (ref 25–95)
RETICS/RBC NFR AUTO: 1.2 % (ref 0.5–2)
SODIUM SERPL-SCNC: 140 MMOL/L (ref 133–144)
SPECIMEN SOURCE: NORMAL
WBC # BLD AUTO: 6.1 10E9/L (ref 4–11)

## 2018-07-06 PROCEDURE — 83540 ASSAY OF IRON: CPT | Performed by: PHYSICIAN ASSISTANT

## 2018-07-06 PROCEDURE — 87640 STAPH A DNA AMP PROBE: CPT | Mod: 59 | Performed by: PHYSICIAN ASSISTANT

## 2018-07-06 PROCEDURE — 99214 OFFICE O/P EST MOD 30 MIN: CPT | Performed by: PHYSICIAN ASSISTANT

## 2018-07-06 PROCEDURE — 93000 ELECTROCARDIOGRAM COMPLETE: CPT | Performed by: PHYSICIAN ASSISTANT

## 2018-07-06 PROCEDURE — 83036 HEMOGLOBIN GLYCOSYLATED A1C: CPT | Performed by: PHYSICIAN ASSISTANT

## 2018-07-06 PROCEDURE — 83550 IRON BINDING TEST: CPT | Performed by: PHYSICIAN ASSISTANT

## 2018-07-06 PROCEDURE — 82728 ASSAY OF FERRITIN: CPT | Performed by: PHYSICIAN ASSISTANT

## 2018-07-06 PROCEDURE — 87641 MR-STAPH DNA AMP PROBE: CPT | Performed by: PHYSICIAN ASSISTANT

## 2018-07-06 PROCEDURE — 85045 AUTOMATED RETICULOCYTE COUNT: CPT | Performed by: PHYSICIAN ASSISTANT

## 2018-07-06 PROCEDURE — 80048 BASIC METABOLIC PNL TOTAL CA: CPT | Performed by: PHYSICIAN ASSISTANT

## 2018-07-06 PROCEDURE — 36415 COLL VENOUS BLD VENIPUNCTURE: CPT | Performed by: PHYSICIAN ASSISTANT

## 2018-07-06 PROCEDURE — 85027 COMPLETE CBC AUTOMATED: CPT | Performed by: PHYSICIAN ASSISTANT

## 2018-07-06 ASSESSMENT — ANXIETY QUESTIONNAIRES
7. FEELING AFRAID AS IF SOMETHING AWFUL MIGHT HAPPEN: NOT AT ALL
5. BEING SO RESTLESS THAT IT IS HARD TO SIT STILL: NOT AT ALL
IF YOU CHECKED OFF ANY PROBLEMS ON THIS QUESTIONNAIRE, HOW DIFFICULT HAVE THESE PROBLEMS MADE IT FOR YOU TO DO YOUR WORK, TAKE CARE OF THINGS AT HOME, OR GET ALONG WITH OTHER PEOPLE: NOT DIFFICULT AT ALL
1. FEELING NERVOUS, ANXIOUS, OR ON EDGE: NOT AT ALL
6. BECOMING EASILY ANNOYED OR IRRITABLE: NOT AT ALL
2. NOT BEING ABLE TO STOP OR CONTROL WORRYING: NOT AT ALL
3. WORRYING TOO MUCH ABOUT DIFFERENT THINGS: NOT AT ALL
GAD7 TOTAL SCORE: 0

## 2018-07-06 ASSESSMENT — PATIENT HEALTH QUESTIONNAIRE - PHQ9: 5. POOR APPETITE OR OVEREATING: NOT AT ALL

## 2018-07-06 NOTE — PROGRESS NOTES
[facility]  [id number] [place] [id number]  [phone]  Dept: [phone]    PRE-OP EVALUATION:  Today's date: 2018    Delilah Miranda (: 1960) presents for pre-operative evaluation assessment as requested by Dr. Zaragoza.  She requires evaluation and anesthesia risk assessment prior to undergoing surgery/procedure for treatment of Arthroplasty Hip.    Fax number for surgical facility: [facility]   Primary Physician: Roxana Cuadra  Type of Anesthesia Anticipated: choice    Patient has a Health Care Directive or Living Will:  NO    Preop Questions 2018   Who is doing your surgery? -Dr. Bryon Zaragoza   What are you having done? -hip surgery   Date of Surgery/Procedure: -2018 @ 7:00 AM   Facility or Hospital where procedure/surgery will be performed: -[facility]    1.  Do you have a history of Heart attack, stroke, stent, coronary bypass surgery, or other heart surgery? No   2.  Do you ever have any pain or discomfort in your chest? No   3.  Do you have a history of  Heart Failure? No   4.   Are you troubled by shortness of breath when:  walking on a level surface, or up a slight hill, or at night? No   5.  Do you currently have a cold, bronchitis or other respiratory infection? No   6.  Do you have a cough, shortness of breath, or wheezing? No   7.  Do you sometimes get pains in the calves of your legs when you walk? No   8. Do you or anyone in your family have previous history of blood clots? No   9.  Do you or does anyone in your family have a serious bleeding problem such as prolonged bleeding following surgeries or cuts? No   10. Have you ever had problems with anemia or been told to take iron pills? YES - hgb 7.2 post previous hip surgery, took iron supplement for a short period of time    11. Have you had any abnormal blood loss such as black, tarry or bloody stools, or abnormal vaginal bleeding? No   12. Have you ever had a  blood transfusion? No   13. Have you or any of your relatives ever had problems with anesthesia? YES - hx of nausea   14. Do you have sleep apnea, excessive snoring or daytime drowsiness? YES - sleep apnea, uses CPAP, will bring to hospital for surgery   15. Do you have any prosthetic heart valves? No   16. Do you have prosthetic joints? YES - Right hip   17. Is there any chance that you may be pregnant? No         HPI:     HPI related to upcoming procedure: chronic left hip pain      ANEMIA - Patient has a recent history of moderate- anemia, which has not been symptomatic. Labs pending  .  ASTHMA - Patient has a longstanding history of mild Asthma . Patient has been doing well overall noting NO SYMPTOMS and continues on medication regimen consisting of albuterol without adverse reactions or side effects.                                                                                                                                               .  DEPRESSION - Patient has a long history of Depression of moderate severity requiring medication for control with recent symptoms being stable..Current symptoms of depression include none.                                                                                                                                                                                .  HYPERLIPIDEMIA - Patient has a long history of significant Hyperlipidemia requiring medication for treatment with recent good control. Patient reports no problems or side effects with the medication.                                                                                                                                                       .    MEDICAL HISTORY:     Patient Active Problem List    Diagnosis Date Noted     S/P hip replacement, right 04/20/2018     Priority: Medium     S/P total hip arthroplasty 04/09/2018     Priority: Medium     Situational anxiety 05/31/2016     Priority: Medium      Overactive bladder 2014     Priority: Medium     Anxiety 10/31/2013     Priority: Medium     Heartburn 2013     Priority: Medium     Snoring 2013     Priority: Medium     Urinary incontinence, nocturnal enuresis 2013     Priority: Medium     Elevated fasting glucose 2012     Priority: Medium     Vitamin D deficiency 2012     Priority: Medium     Hyperlipidemia LDL goal <160 2012     Priority: Medium     OA (osteoarthritis) of knee 2012     Priority: Medium     Mild major depression (H) 2010     Priority: Medium     Mild intermittent asthma 2008     Priority: Medium     Allergic rhinitis 10/22/2003     Priority: Medium     Problem list name updated by automated process. Provider to review       Thoracic or lumbosacral neuritis or radiculitis, unspecified 10/22/2003     Priority: Medium      Past Medical History:   Diagnosis Date     Anxiety 10/31/2013     Degeneration of lumbar or lumbosacral intervertebral disc      Mild intermittent asthma      Other chronic pain      Personal history of tobacco use, presenting hazards to health      Pneumonia      PONV (postoperative nausea and vomiting)      Sleep apnea     Will bring CPAP     Past Surgical History:   Procedure Laterality Date     ARTHROPLASTY HIP Right 2018    Procedure: ARTHROPLASTY HIP;  Right total hip arthroplasty using a Biomet G7 acetabulum and Taperloc femoral stem. ;  Surgeon: Bryon Zaragoza MD;  Location: [facility] OR     ARTHROSCOPY KNEE BILATERAL       C NONSPECIFIC PROCEDURE      meniscus      SECTION       excsion of lypoma      times 3     Current Outpatient Prescriptions   Medication Sig Dispense Refill     albuterol (ALBUTEROL) 108 (90 BASE) MCG/ACT inhaler Inhale 2 puffs into the lungs every 6 hours as needed INHALE 1 TO 2 PUFFS EVERY 4 TO 6 HOURS AS NEEDED 2 Inhaler 3     alpha-lipoic acid 600 MG CAPS Take 1 capsule by mouth daily       amitriptyline (ELAVIL) 25 MG  tablet TAKE ONE TABLET BY MOUTH AT BEDTIME 90 tablet 1     aspirin  MG EC tablet Take one Aspirin tab twice daily for 5 weeks. 70 tablet 0     atorvastatin (LIPITOR) 40 MG tablet Take 1 tablet (40 mg) by mouth daily 90 tablet 3     Biotin 87842 MCG TABS Take 1 tablet by mouth daily       cyclobenzaprine (FLEXERIL) 5 MG tablet Take 5 mg by mouth 2 times daily as needed        ferrous sulfate (IRON) 325 (65 Fe) MG tablet Take 1 tablet (325 mg) by mouth daily (with breakfast) 14 tablet 0     fluticasone (FLONASE) 50 MCG/ACT spray Spray 1 spray into both nostrils daily as needed for rhinitis or allergies       hydrOXYzine (ATARAX) 25 MG tablet Take 1-2 tablets (25-50 mg) by mouth every 6 hours as needed for anxiety 90 tablet 1     ibuprofen (ADVIL/MOTRIN) 800 MG tablet 800 mg every 6 hours as needed (Will stop 7 days pre op) prn       Magnesium (CVS TRIPLE MAGNESIUM COMPLEX) 400 MG CAPS Take 1 capsule by mouth daily       methocarbamol (ROBAXIN) 500 MG tablet Take 2 tablets (1,000 mg) by mouth 3 times daily as needed for muscle spasms 90 tablet 1     order for DME Equipment being ordered: Walker Wheels () and Walker ()  Treatment Diagnosis: Impaired gait s/p MEKA. 1 each 0     oxyCODONE-acetaminophen (PERCOCET) 5-325 MG per tablet Take 1-2 tablets by mouth every 4 hours as needed for moderate to severe pain 75 tablet 0     sertraline (ZOLOFT) 100 MG tablet TAKE ONE AND ONE-HALF TABLETS  BY MOUTH ONCE DAILY 135 tablet 1     Sertraline HCl (ZOLOFT PO) Take 150 mg by mouth daily       VESICARE 5 MG tablet TAKE ONE TABLET BY MOUTH EVERY DAY 90 tablet 2     VITAMIN D, CHOLECALCIFEROL, PO Take 5,000 Units by mouth daily       OTC products: NSAIDS    Allergies   Allergen Reactions     No Known Drug Allergies      Metal [Staples] Rash      Latex Allergy: NO    Social History   Substance Use Topics     Smoking status: Former Smoker     Packs/day: 0.50     Years: 5.00     Types: Cigarettes     Quit date: 3/29/1993      Smokeless tobacco: Never Used      Comment: smoked 1 pk qd in her twenties quit at age 26     Alcohol use 0.0 oz/week     0 Standard drinks or equivalent per week      Comment: 3 beers monthly     History   Drug Use No       REVIEW OF SYSTEMS:   Constitutional, neuro, ENT, endocrine, pulmonary, cardiac, gastrointestinal, genitourinary, musculoskeletal, integument and psychiatric systems are negative, except as otherwise noted.    EXAM:   LMP 04/25/2007   /75 (BP Location: Right arm, Patient Position: Chair, Cuff Size: Adult Large)  Pulse 95  Temp 98.1  F (36.7  C) (Oral)  Resp 16  Wt 183 lb (83 kg)  LMP 04/25/2007  SpO2 96%  BMI 33.47 kg/m2      GENERAL APPEARANCE: healthy, alert and no distress     EYES: EOMI, PERRL     HENT: ear canals and TM's normal and nose and mouth without ulcers or lesions     NECK: no adenopathy, no asymmetry, masses, or scars and thyroid normal to palpation     RESP: lungs clear to auscultation - no rales, rhonchi or wheezes     CV: regular rates and rhythm, normal S1 S2, no S3 or S4 and no murmur, click or rub     ABDOMEN:  soft, nontender, no HSM or masses and bowel sounds normal     MS: extremities normal- no gross deformities noted, no evidence of inflammation in joints, FROM in all extremities.     SKIN: no suspicious lesions or rashes     NEURO: Normal strength and tone, sensory exam grossly normal, mentation intact and speech normal     PSYCH: mentation appears normal. and affect normal/bright     LYMPHATICS: No cervical adenopathy    DIAGNOSTICS:   EKG: appears normal, NSR, normal axis, normal intervals, no acute ST/T changes c/w ischemia, no LVH by voltage criteria, unchanged from previous tracings    Results for orders placed or performed in visit on 07/06/18   CBC with platelets   Result Value Ref Range    WBC 6.1 4.0 - 11.0 10e9/L    RBC Count 4.53 3.8 - 5.2 10e12/L    Hemoglobin 12.6 11.7 - 15.7 g/dL    Hematocrit 38.4 35.0 - 47.0 %    MCV 85 78 - 100 fl     MCH 27.8 26.5 - 33.0 pg    MCHC 32.8 31.5 - 36.5 g/dL    RDW 13.3 10.0 - 15.0 %    Platelet Count 284 150 - 450 10e9/L   Reticulocyte count   Result Value Ref Range    % Retic 1.2 0.5 - 2.0 %    Absolute Retic 53.0 25 - 95 10e9/L   Basic metabolic panel  (Ca, Cl, CO2, Creat, Gluc, K, Na, BUN)   Result Value Ref Range    Sodium 140 133 - 144 mmol/L    Potassium 4.1 3.4 - 5.3 mmol/L    Chloride 107 94 - 109 mmol/L    Carbon Dioxide 25 20 - 32 mmol/L    Anion Gap 8 3 - 14 mmol/L    Glucose 134 (H) 70 - 99 mg/dL    Urea Nitrogen 11 7 - 30 mg/dL    Creatinine 0.62 0.52 - 1.04 mg/dL    GFR Estimate >90 >60 mL/min/1.7m2    GFR Estimate If Black >90 >60 mL/min/1.7m2    Calcium 9.3 8.5 - 10.1 mg/dL   Hemoglobin A1c   Result Value Ref Range    Hemoglobin A1C 6.3 (H) 0 - 5.6 %   Methicillin Resist/Sens S. aureus PCR   Result Value Ref Range    Specimen Description Nares     Methicillin Resist/Sens S. aureus PCR Negative NEG^Negative         Recent Labs   Lab Test  04/20/18   0812  04/12/18   1158  04/12/18   0642   03/29/18   0820  01/10/18   0800  12/05/17   0809  11/11/16   0802   HGB  11.5*  7.6*  7.2*   < >  14.1   --    --    --    PLT  610*   --   200   --   276   --    --    --    NA   --    --    --    --   140   --   141   --    POTASSIUM   --    --    --    --   4.0   --   3.6   --    CR   --    --    --    --   0.69   --   0.63   --    A1C   --    --    --    --    --   6.0   --   5.8    < > = values in this interval not displayed.        IMPRESSION:   Reason for surgery/procedure: Left Total hip Arthroplasty   Diagnosis/reason for consult: preop    The proposed surgical procedure is considered INTERMEDIATE risk.    REVISED CARDIAC RISK INDEX  The patient has the following serious cardiovascular risks for perioperative complications such as (MI, PE, VFib and 3  AV Block):  No serious cardiac risks  INTERPRETATION: 1 risks: Class II (low risk - 0.9% complication rate)    The patient has the following additional risks  for perioperative complications:  No identified additional risks      ICD-10-CM    1. Preop general physical exam Z01.818        RECOMMENDATIONS:       Anemia  Anemia and does not require treatment prior to surgery.  Monitor Hemoglobin postoperatively.      --Patient is to hold all scheduled medications on the day of surgery    APPROVAL GIVEN to proceed with proposed procedure, without further diagnostic evaluation       Signed Electronically by: Roxana Cuadra PA-C    Copy of this evaluation report is provided to requesting physician.    [facility] Preop Guidelines    Revised Cardiac Risk Index

## 2018-07-06 NOTE — MR AVS SNAPSHOT
After Visit Summary   7/6/2018    Delilah Miranda    MRN: [id number]           Patient Information     Date Of Birth          1960        Visit Information        Provider Department      7/6/2018 8:30 AM Roxana Cuadra PA-C [facility]        Today's Diagnoses     Preop general physical exam    -  1    Screening for HIV (human immunodeficiency virus)          Care Instructions      Before Your Surgery      Call your surgeon if there is any change in your health. This includes signs of a cold or flu (such as a sore throat, runny nose, cough, rash or fever).    Do not smoke, drink alcohol or take over the counter medicine (unless your surgeon or primary care doctor tells you to) for the 24 hours before and after surgery.    If you take prescribed drugs: Follow your doctor s orders about which medicines to take and which to stop until after surgery.    Eating and drinking prior to surgery: follow the instructions from your surgeon    Take a shower or bath the night before surgery. Use the soap your surgeon gave you to gently clean your skin. If you do not have soap from your surgeon, use your regular soap. Do not shave or scrub the surgery site.  Wear clean pajamas and have clean sheets on your bed.           Follow-ups after your visit        Your next 10 appointments already scheduled     Jul 30, 2018   Procedure with Bryon Zaragoza MD   [facility] (--)    [address] [place] [facility] [id number] [phone]              Who to contact     If you have questions or need follow up information about today's clinic visit or your schedule please contact [facility] directly at [phone].  Normal or non-critical lab and imaging results will be communicated to you by MyChart, letter or phone within 4 business days after the clinic has received the results. If you do not hear from us within 7 days, please contact the clinic  through [employer] or phone. If you have a critical or abnormal lab result, we will notify you by phone as soon as possible.  Submit refill requests through [employer] or call your pharmacy and they will forward the refill request to us. Please allow 3 business days for your refill to be completed.          Additional Information About Your Visit        [employer] Information     [employer] gives you secure access to your electronic health record. If you see a primary care provider, you can also send messages to your care team and make appointments. If you have questions, please call your primary care clinic.  If you do not have a primary care provider, please call [phone] and they will assist you.        Care EveryWhere ID     This is your Care EveryWhere ID. This could be used by other organizations to access your [facility] medical records  [id number]        Your Vitals Were     Pulse Temperature Respirations Last Period Pulse Oximetry BMI (Body Mass Index)    95 98.1  F (36.7  C) (Oral) 16 04/25/2007 96% 33.47 kg/m2       Blood Pressure from Last 3 Encounters:   07/06/18 119/75   04/20/18 114/80   04/12/18 107/58    Weight from Last 3 Encounters:   07/06/18 183 lb (83 kg)   04/20/18 183 lb (83 kg)   04/09/18 184 lb (83.5 kg)              Today, you had the following     No orders found for display       Primary Care Provider Office Phone # Fax #    Roxana MARCIAL Cuadra PA-C [phone] [address] [facility] [address]        Equal Access to Services     JACKIE HANSEN : Hadii aad ku hadasho Soomaali, waaxda luqadaha, qaybta kaalmada adeegyada, waxay etelvina weber . So [facility] [phone].    ATENCIÓN: Si habla español, tiene a rodriguez disposición servicios gratuitos de asistencia lingüística. Llame al [phone].    We comply with applicable federal civil rights laws and [place] laws. We do not discriminate on the basis of race, color, national origin, age, disability, sex, sexual  orientation, or gender identity.            Thank you!     Thank you for choosing [facility]  for your care. Our goal is always to provide you with excellent care. Hearing back from our patients is one way we can continue to improve our services. Please take a few minutes to complete the written survey that you may receive in the mail after your visit with us. Thank you!             Your Updated Medication List - Protect others around you: Learn how to safely use, store and throw away your medicines at www.disposemymeds.org.          This list is accurate as of 7/6/18  8:33 AM.  Always use your most recent med list.                   Brand Name Dispense Instructions for use Diagnosis    albuterol 108 (90 Base) MCG/ACT Inhaler    PROAIR HFA    2 Inhaler    Inhale 2 puffs into the lungs every 6 hours as needed INHALE 1 TO 2 PUFFS EVERY 4 TO 6 HOURS AS NEEDED    Mild intermittent asthma       alpha-lipoic acid 600 MG Caps      Take 1 capsule by mouth daily        amitriptyline 25 MG tablet    ELAVIL    90 tablet    TAKE ONE TABLET BY MOUTH AT BEDTIME    Psychophysiological insomnia       aspirin 325 MG EC tablet     70 tablet    Take one Aspirin tab twice daily for 5 weeks.    Status post total replacement of right hip       atorvastatin 40 MG tablet    LIPITOR    90 tablet    Take 1 tablet (40 mg) by mouth daily    Hyperlipidemia LDL goal <130       Biotin 70603 MCG Tabs      Take 1 tablet by mouth daily        CVS TRIPLE MAGNESIUM COMPLEX 400 MG Caps   Generic drug:  Magnesium      Take 1 capsule by mouth daily        cyclobenzaprine 5 MG tablet    FLEXERIL     Take 5 mg by mouth 2 times daily as needed        ferrous sulfate 325 (65 Fe) MG tablet    IRON    14 tablet    Take 1 tablet (325 mg) by mouth daily (with breakfast)    Status post total replacement of right hip       fluticasone 50 MCG/ACT spray    FLONASE     Spray 1 spray into both nostrils daily as needed for rhinitis or allergies         hydrOXYzine 25 MG tablet    ATARAX    90 tablet    Take 1-2 tablets (25-50 mg) by mouth every 6 hours as needed for anxiety    Situational anxiety       ibuprofen 800 MG tablet    ADVIL/MOTRIN     800 mg every 6 hours as needed (Will stop 7 days pre op) prn        methocarbamol 500 MG tablet    ROBAXIN    90 tablet    Take 2 tablets (1,000 mg) by mouth 3 times daily as needed for muscle spasms    Muscle spasm       order for DME     1 each    Equipment being ordered: Walker Wheels () and Walker () Treatment Diagnosis: Impaired gait s/p MEKA.    Status post total replacement of right hip       oxyCODONE-acetaminophen 5-325 MG per tablet    PERCOCET    75 tablet    Take 1-2 tablets by mouth every 4 hours as needed for moderate to severe pain    Status post total replacement of right hip       VESICARE 5 MG tablet   Generic drug:  solifenacin     90 tablet    TAKE ONE TABLET BY MOUTH EVERY DAY    Overactive bladder       VITAMIN D (CHOLECALCIFEROL) PO      Take 5,000 Units by mouth daily        * ZOLOFT PO      Take 150 mg by mouth daily        * sertraline 100 MG tablet    ZOLOFT    135 tablet    TAKE ONE AND ONE-HALF TABLETS  BY MOUTH ONCE DAILY    Anxiety       * Notice:  This list has 2 medication(s) that are the same as other medications prescribed for you. Read the directions carefully, and ask your doctor or other care provider to review them with you.

## 2018-07-07 LAB
FERRITIN SERPL-MCNC: 20 NG/ML (ref 8–252)
IRON SATN MFR SERPL: 11 % (ref 15–46)
IRON SERPL-MCNC: 46 UG/DL (ref 35–180)
TIBC SERPL-MCNC: 407 UG/DL (ref 240–430)

## 2018-07-07 ASSESSMENT — ANXIETY QUESTIONNAIRES: GAD7 TOTAL SCORE: 0

## 2018-07-07 ASSESSMENT — PATIENT HEALTH QUESTIONNAIRE - PHQ9: SUM OF ALL RESPONSES TO PHQ QUESTIONS 1-9: 3

## 2018-07-07 ASSESSMENT — ASTHMA QUESTIONNAIRES: ACT_TOTALSCORE: 25

## 2018-07-12 RX ORDER — ASPIRIN 81 MG/1
81 TABLET, CHEWABLE ORAL DAILY
Status: ON HOLD | COMMUNITY
End: 2018-07-31

## 2018-07-22 NOTE — PHARMACY-ADMISSION MEDICATION HISTORY
Admission medication history interview status for this patient is complete. See [facility] admission navigator for allergy information, prior to admission medications and immunization status.     Med rec completed by pre admitting Ailyn Padron RN Wed Jul 11, 2018  9:13 AM            Prior to Admission medications    Medication Sig Last Dose Taking? Auth Provider   albuterol (ALBUTEROL) 108 (90 BASE) MCG/ACT inhaler Inhale 2 puffs into the lungs every 6 hours as needed INHALE 1 TO 2 PUFFS EVERY 4 TO 6 HOURS AS NEEDED  Yes Roxana Cuadra PA-C   alpha-lipoic acid 600 MG CAPS Take 1 capsule by mouth daily  Yes Reported, Patient   amitriptyline (ELAVIL) 25 MG tablet TAKE ONE TABLET BY MOUTH AT BEDTIME  Patient taking differently: nightly as needed   Yes Roxana Cuadra PA-C   aspirin (ASPIRIN 81) 81 MG chewable tablet Take 81 mg by mouth daily  Yes Reported, Patient   atorvastatin (LIPITOR) 40 MG tablet Take 1 tablet (40 mg) by mouth daily  Yes Roxana Cuadra PA-C   Biotin 49343 MCG TABS Take 1 tablet by mouth daily  Yes Reported, Patient   fluticasone (FLONASE) 50 MCG/ACT spray Spray 1 spray into both nostrils daily as needed for rhinitis or allergies  Yes Reported, Patient   hydrOXYzine (ATARAX) 25 MG tablet Take 1-2 tablets (25-50 mg) by mouth every 6 hours as needed for anxiety  Yes Roxana Cuadra PA-C   ibuprofen (ADVIL/MOTRIN) 800 MG tablet 800 mg every 6 hours as needed (Will stop 7 days pre op) prn  Yes Reported, Patient   Magnesium (CVS TRIPLE MAGNESIUM COMPLEX) 400 MG CAPS Take 1 capsule by mouth daily  Yes Reported, Patient   methocarbamol (ROBAXIN) 500 MG tablet Take 2 tablets (1,000 mg) by mouth 3 times daily as needed for muscle spasms  Yes Roxana Cuadra PA-C   sertraline (ZOLOFT) 100 MG tablet TAKE ONE AND ONE-HALF TABLETS  BY MOUTH ONCE DAILY  Yes Roxana Cuadra PA-C   VESICARE 5 MG tablet TAKE ONE TABLET BY MOUTH EVERY DAY  Yes Roxana Cuadra PA-C   order for DME Equipment  being ordered: Walker Wheels () and Walker ()  Treatment Diagnosis: Impaired gait s/p MEKA.   Bryon Zaragoza MD

## 2018-07-24 NOTE — H&P (VIEW-ONLY)
[facility] [place]  [id number] [place] [id number]  [phone]  Dept: [phone]    PRE-OP EVALUATION:  Today's date: 2018    Delilah Miranda (: 1960) presents for pre-operative evaluation assessment as requested by Dr. Zaragoza.  She requires evaluation and anesthesia risk assessment prior to undergoing surgery/procedure for treatment of Arthroplasty Hip.    Fax number for surgical facility: [facility]   Primary Physician: Roxana Cuadra  Type of Anesthesia Anticipated: choice    Patient has a Health Care Directive or Living Will:  NO    Preop Questions 2018   Who is doing your surgery? -Dr. Bryon Zaragoza   What are you having done? -hip surgery   Date of Surgery/Procedure: -2018 @ 7:00 AM   Facility or Hospital where procedure/surgery will be performed: -[facility]    1.  Do you have a history of Heart attack, stroke, stent, coronary bypass surgery, or other heart surgery? No   2.  Do you ever have any pain or discomfort in your chest? No   3.  Do you have a history of  Heart Failure? No   4.   Are you troubled by shortness of breath when:  walking on a level surface, or up a slight hill, or at night? No   5.  Do you currently have a cold, bronchitis or other respiratory infection? No   6.  Do you have a cough, shortness of breath, or wheezing? No   7.  Do you sometimes get pains in the calves of your legs when you walk? No   8. Do you or anyone in your family have previous history of blood clots? No   9.  Do you or does anyone in your family have a serious bleeding problem such as prolonged bleeding following surgeries or cuts? No   10. Have you ever had problems with anemia or been told to take iron pills? YES - hgb 7.2 post previous hip surgery, took iron supplement for a short period of time    11. Have you had any abnormal blood loss such as black, tarry or bloody stools, or abnormal vaginal bleeding? No   12. Have you ever had a  blood transfusion? No   13. Have you or any of your relatives ever had problems with anesthesia? YES - hx of nausea   14. Do you have sleep apnea, excessive snoring or daytime drowsiness? YES - sleep apnea, uses CPAP, will bring to hospital for surgery   15. Do you have any prosthetic heart valves? No   16. Do you have prosthetic joints? YES - Right hip   17. Is there any chance that you may be pregnant? No         HPI:     HPI related to upcoming procedure: chronic left hip pain      ANEMIA - Patient has a recent history of moderate- anemia, which has not been symptomatic. Labs pending  .  ASTHMA - Patient has a longstanding history of mild Asthma . Patient has been doing well overall noting NO SYMPTOMS and continues on medication regimen consisting of albuterol without adverse reactions or side effects.                                                                                                                                               .  DEPRESSION - Patient has a long history of Depression of moderate severity requiring medication for control with recent symptoms being stable..Current symptoms of depression include none.                                                                                                                                                                                .  HYPERLIPIDEMIA - Patient has a long history of significant Hyperlipidemia requiring medication for treatment with recent good control. Patient reports no problems or side effects with the medication.                                                                                                                                                       .    MEDICAL HISTORY:     Patient Active Problem List    Diagnosis Date Noted     S/P hip replacement, right 04/20/2018     Priority: Medium     S/P total hip arthroplasty 04/09/2018     Priority: Medium     Situational anxiety 05/31/2016     Priority: Medium      Overactive bladder 2014     Priority: Medium     Anxiety 10/31/2013     Priority: Medium     Heartburn 2013     Priority: Medium     Snoring 2013     Priority: Medium     Urinary incontinence, nocturnal enuresis 2013     Priority: Medium     Elevated fasting glucose 2012     Priority: Medium     Vitamin D deficiency 2012     Priority: Medium     Hyperlipidemia LDL goal <160 2012     Priority: Medium     OA (osteoarthritis) of knee 2012     Priority: Medium     Mild major depression (H) 2010     Priority: Medium     Mild intermittent asthma 2008     Priority: Medium     Allergic rhinitis 10/22/2003     Priority: Medium     Problem list name updated by automated process. Provider to review       Thoracic or lumbosacral neuritis or radiculitis, unspecified 10/22/2003     Priority: Medium      Past Medical History:   Diagnosis Date     Anxiety 10/31/2013     Degeneration of lumbar or lumbosacral intervertebral disc      Mild intermittent asthma      Other chronic pain      Personal history of tobacco use, presenting hazards to health      Pneumonia      PONV (postoperative nausea and vomiting)      Sleep apnea     Will bring CPAP     Past Surgical History:   Procedure Laterality Date     ARTHROPLASTY HIP Right 2018    Procedure: ARTHROPLASTY HIP;  Right total hip arthroplasty using a Biomet G7 acetabulum and Taperloc femoral stem. ;  Surgeon: Bryon Zaragoza MD;  Location: [facility] OR     ARTHROSCOPY KNEE BILATERAL       C NONSPECIFIC PROCEDURE      meniscus      SECTION       excsion of lypoma      times 3     Current Outpatient Prescriptions   Medication Sig Dispense Refill     albuterol (ALBUTEROL) 108 (90 BASE) MCG/ACT inhaler Inhale 2 puffs into the lungs every 6 hours as needed INHALE 1 TO 2 PUFFS EVERY 4 TO 6 HOURS AS NEEDED 2 Inhaler 3     alpha-lipoic acid 600 MG CAPS Take 1 capsule by mouth daily       amitriptyline (ELAVIL) 25 MG  tablet TAKE ONE TABLET BY MOUTH AT BEDTIME 90 tablet 1     aspirin  MG EC tablet Take one Aspirin tab twice daily for 5 weeks. 70 tablet 0     atorvastatin (LIPITOR) 40 MG tablet Take 1 tablet (40 mg) by mouth daily 90 tablet 3     Biotin 77213 MCG TABS Take 1 tablet by mouth daily       cyclobenzaprine (FLEXERIL) 5 MG tablet Take 5 mg by mouth 2 times daily as needed        ferrous sulfate (IRON) 325 (65 Fe) MG tablet Take 1 tablet (325 mg) by mouth daily (with breakfast) 14 tablet 0     fluticasone (FLONASE) 50 MCG/ACT spray Spray 1 spray into both nostrils daily as needed for rhinitis or allergies       hydrOXYzine (ATARAX) 25 MG tablet Take 1-2 tablets (25-50 mg) by mouth every 6 hours as needed for anxiety 90 tablet 1     ibuprofen (ADVIL/MOTRIN) 800 MG tablet 800 mg every 6 hours as needed (Will stop 7 days pre op) prn       Magnesium ([employer] TRIPLE MAGNESIUM COMPLEX) 400 MG CAPS Take 1 capsule by mouth daily       methocarbamol (ROBAXIN) 500 MG tablet Take 2 tablets (1,000 mg) by mouth 3 times daily as needed for muscle spasms 90 tablet 1     order for DME Equipment being ordered: Walker Wheels () and Walker ()  Treatment Diagnosis: Impaired gait s/p MEKA. 1 each 0     oxyCODONE-acetaminophen (PERCOCET) 5-325 MG per tablet Take 1-2 tablets by mouth every 4 hours as needed for moderate to severe pain 75 tablet 0     sertraline (ZOLOFT) 100 MG tablet TAKE ONE AND ONE-HALF TABLETS  BY MOUTH ONCE DAILY 135 tablet 1     Sertraline HCl (ZOLOFT PO) Take 150 mg by mouth daily       VESICARE 5 MG tablet TAKE ONE TABLET BY MOUTH EVERY DAY 90 tablet 2     VITAMIN D, CHOLECALCIFEROL, PO Take 5,000 Units by mouth daily       OTC products: NSAIDS    Allergies   Allergen Reactions     No Known Drug Allergies      Metal [Staples] Rash      Latex Allergy: NO    Social History   Substance Use Topics     Smoking status: Former Smoker     Packs/day: 0.50     Years: 5.00     Types: Cigarettes     Quit date: 3/29/1993      Smokeless tobacco: Never Used      Comment: smoked 1 pk qd in her twenties quit at age 26     Alcohol use 0.0 oz/week     0 Standard drinks or equivalent per week      Comment: 3 beers monthly     History   Drug Use No       REVIEW OF SYSTEMS:   Constitutional, neuro, ENT, endocrine, pulmonary, cardiac, gastrointestinal, genitourinary, musculoskeletal, integument and psychiatric systems are negative, except as otherwise noted.    EXAM:   LMP 04/25/2007   /75 (BP Location: Right arm, Patient Position: Chair, Cuff Size: Adult Large)  Pulse 95  Temp 98.1  F (36.7  C) (Oral)  Resp 16  Wt 183 lb (83 kg)  LMP 04/25/2007  SpO2 96%  BMI 33.47 kg/m2      GENERAL APPEARANCE: healthy, alert and no distress     EYES: EOMI, PERRL     HENT: ear canals and TM's normal and nose and mouth without ulcers or lesions     NECK: no adenopathy, no asymmetry, masses, or scars and thyroid normal to palpation     RESP: lungs clear to auscultation - no rales, rhonchi or wheezes     CV: regular rates and rhythm, normal S1 S2, no S3 or S4 and no murmur, click or rub     ABDOMEN:  soft, nontender, no HSM or masses and bowel sounds normal     MS: extremities normal- no gross deformities noted, no evidence of inflammation in joints, FROM in all extremities.     SKIN: no suspicious lesions or rashes     NEURO: Normal strength and tone, sensory exam grossly normal, mentation intact and speech normal     PSYCH: mentation appears normal. and affect normal/bright     LYMPHATICS: No cervical adenopathy    DIAGNOSTICS:   EKG: appears normal, NSR, normal axis, normal intervals, no acute ST/T changes c/w ischemia, no LVH by voltage criteria, unchanged from previous tracings    Results for orders placed or performed in visit on 07/06/18   CBC with platelets   Result Value Ref Range    WBC 6.1 4.0 - 11.0 10e9/L    RBC Count 4.53 3.8 - 5.2 10e12/L    Hemoglobin 12.6 11.7 - 15.7 g/dL    Hematocrit 38.4 35.0 - 47.0 %    MCV 85 78 - 100 fl     MCH 27.8 26.5 - 33.0 pg    MCHC 32.8 31.5 - 36.5 g/dL    RDW 13.3 10.0 - 15.0 %    Platelet Count 284 150 - 450 10e9/L   Reticulocyte count   Result Value Ref Range    % Retic 1.2 0.5 - 2.0 %    Absolute Retic 53.0 25 - 95 10e9/L   Basic metabolic panel  (Ca, Cl, CO2, Creat, Gluc, K, Na, BUN)   Result Value Ref Range    Sodium 140 133 - 144 mmol/L    Potassium 4.1 3.4 - 5.3 mmol/L    Chloride 107 94 - 109 mmol/L    Carbon Dioxide 25 20 - 32 mmol/L    Anion Gap 8 3 - 14 mmol/L    Glucose 134 (H) 70 - 99 mg/dL    Urea Nitrogen 11 7 - 30 mg/dL    Creatinine 0.62 0.52 - 1.04 mg/dL    GFR Estimate >90 >60 mL/min/1.7m2    GFR Estimate If Black >90 >60 mL/min/1.7m2    Calcium 9.3 8.5 - 10.1 mg/dL   Hemoglobin A1c   Result Value Ref Range    Hemoglobin A1C 6.3 (H) 0 - 5.6 %   Methicillin Resist/Sens S. aureus PCR   Result Value Ref Range    Specimen Description Nares     Methicillin Resist/Sens S. aureus PCR Negative NEG^Negative         Recent Labs   Lab Test  04/20/18   0812  04/12/18   1158  04/12/18   0642   03/29/18   0820  01/10/18   0800  12/05/17   0809  11/11/16   0802   HGB  11.5*  7.6*  7.2*   < >  14.1   --    --    --    PLT  610*   --   200   --   276   --    --    --    NA   --    --    --    --   140   --   141   --    POTASSIUM   --    --    --    --   4.0   --   3.6   --    CR   --    --    --    --   0.69   --   0.63   --    A1C   --    --    --    --    --   6.0   --   5.8    < > = values in this interval not displayed.        IMPRESSION:   Reason for surgery/procedure: Left Total hip Arthroplasty   Diagnosis/reason for consult: preop    The proposed surgical procedure is considered INTERMEDIATE risk.    REVISED CARDIAC RISK INDEX  The patient has the following serious cardiovascular risks for perioperative complications such as (MI, PE, VFib and 3  AV Block):  No serious cardiac risks  INTERPRETATION: 1 risks: Class II (low risk - 0.9% complication rate)    The patient has the following additional risks  for perioperative complications:  No identified additional risks      ICD-10-CM    1. Preop general physical exam Z01.818        RECOMMENDATIONS:       Anemia  Anemia and does not require treatment prior to surgery.  Monitor Hemoglobin postoperatively.      --Patient is to hold all scheduled medications on the day of surgery    APPROVAL GIVEN to proceed with proposed procedure, without further diagnostic evaluation       Signed Electronically by: Roxana Cuadra PA-C    Copy of this evaluation report is provided to requesting physician.    [facility] Preop Guidelines    Revised Cardiac Risk Index

## 2018-07-30 ENCOUNTER — ANESTHESIA (OUTPATIENT)
Dept: SURGERY | Facility: CLINIC | Age: 58
DRG: 470 | End: 2018-07-30
Payer: COMMERCIAL

## 2018-07-30 ENCOUNTER — APPOINTMENT (OUTPATIENT)
Dept: GENERAL RADIOLOGY | Facility: CLINIC | Age: 58
DRG: 470 | End: 2018-07-30
Attending: ORTHOPAEDIC SURGERY
Payer: COMMERCIAL

## 2018-07-30 ENCOUNTER — HOSPITAL ENCOUNTER (INPATIENT)
Facility: CLINIC | Age: 58
LOS: 2 days | Discharge: HOME | DRG: 470 | End: 2018-08-01
Attending: ORTHOPAEDIC SURGERY | Admitting: ORTHOPAEDIC SURGERY
Payer: COMMERCIAL

## 2018-07-30 ENCOUNTER — APPOINTMENT (OUTPATIENT)
Dept: PHYSICAL THERAPY | Facility: CLINIC | Age: 58
DRG: 470 | End: 2018-07-30
Attending: ORTHOPAEDIC SURGERY
Payer: COMMERCIAL

## 2018-07-30 ENCOUNTER — ANESTHESIA EVENT (OUTPATIENT)
Dept: SURGERY | Facility: CLINIC | Age: 58
DRG: 470 | End: 2018-07-30
Payer: COMMERCIAL

## 2018-07-30 DIAGNOSIS — F41.8 SITUATIONAL ANXIETY: ICD-10-CM

## 2018-07-30 DIAGNOSIS — D62 ANEMIA DUE TO BLOOD LOSS, ACUTE: ICD-10-CM

## 2018-07-30 DIAGNOSIS — Z96.642 STATUS POST TOTAL REPLACEMENT OF LEFT HIP: Primary | ICD-10-CM

## 2018-07-30 LAB
CREAT SERPL-MCNC: 0.58 MG/DL (ref 0.52–1.04)
GFR SERPL CREATININE-BSD FRML MDRD: >90 ML/MIN/1.7M2
PLATELET # BLD AUTO: 242 10E9/L (ref 150–450)

## 2018-07-30 PROCEDURE — 97116 GAIT TRAINING THERAPY: CPT | Mod: GP

## 2018-07-30 PROCEDURE — 12000007 ZZH R&B INTERMEDIATE

## 2018-07-30 PROCEDURE — 25000128 H RX IP 250 OP 636: Performed by: ANESTHESIOLOGY

## 2018-07-30 PROCEDURE — 27210794 ZZH OR GENERAL SUPPLY STERILE: Performed by: ORTHOPAEDIC SURGERY

## 2018-07-30 PROCEDURE — 27211024 ZZHC OR SUPPLY OTHER OPNP: Performed by: ORTHOPAEDIC SURGERY

## 2018-07-30 PROCEDURE — 97161 PT EVAL LOW COMPLEX 20 MIN: CPT | Mod: GP

## 2018-07-30 PROCEDURE — 71000013 ZZH RECOVERY PHASE 1 LEVEL 1 EA ADDTL HR: Performed by: ORTHOPAEDIC SURGERY

## 2018-07-30 PROCEDURE — 40000193 ZZH STATISTIC PT WARD VISIT

## 2018-07-30 PROCEDURE — 82565 ASSAY OF CREATININE: CPT | Performed by: ORTHOPAEDIC SURGERY

## 2018-07-30 PROCEDURE — 40000306 ZZH STATISTIC PRE PROC ASSESS II: Performed by: ORTHOPAEDIC SURGERY

## 2018-07-30 PROCEDURE — 40000986 XR PELVIS AND HIP PORTABLE LEFT 1 VIEW

## 2018-07-30 PROCEDURE — 25000128 H RX IP 250 OP 636: Performed by: PHYSICIAN ASSISTANT

## 2018-07-30 PROCEDURE — 0SRB02A REPLACEMENT OF LEFT HIP JOINT WITH METAL ON POLYETHYLENE SYNTHETIC SUBSTITUTE, UNCEMENTED, OPEN APPROACH: ICD-10-PCS | Performed by: ORTHOPAEDIC SURGERY

## 2018-07-30 PROCEDURE — 25000128 H RX IP 250 OP 636: Performed by: ORTHOPAEDIC SURGERY

## 2018-07-30 PROCEDURE — 25000128 H RX IP 250 OP 636: Performed by: NURSE ANESTHETIST, CERTIFIED REGISTERED

## 2018-07-30 PROCEDURE — 85049 AUTOMATED PLATELET COUNT: CPT | Performed by: ORTHOPAEDIC SURGERY

## 2018-07-30 PROCEDURE — 25000125 ZZHC RX 250: Performed by: NURSE ANESTHETIST, CERTIFIED REGISTERED

## 2018-07-30 PROCEDURE — 25000132 ZZH RX MED GY IP 250 OP 250 PS 637: Performed by: PHYSICIAN ASSISTANT

## 2018-07-30 PROCEDURE — 25000566 ZZH SEVOFLURANE, EA 15 MIN: Performed by: ORTHOPAEDIC SURGERY

## 2018-07-30 PROCEDURE — 36000063 ZZH SURGERY LEVEL 4 EA 15 ADDTL MIN: Performed by: ORTHOPAEDIC SURGERY

## 2018-07-30 PROCEDURE — 37000008 ZZH ANESTHESIA TECHNICAL FEE, 1ST 30 MIN: Performed by: ORTHOPAEDIC SURGERY

## 2018-07-30 PROCEDURE — 40000985 XR HIP PORT LT 1 VW

## 2018-07-30 PROCEDURE — 25800025 ZZH RX 258: Performed by: ORTHOPAEDIC SURGERY

## 2018-07-30 PROCEDURE — 99222 1ST HOSP IP/OBS MODERATE 55: CPT | Performed by: INTERNAL MEDICINE

## 2018-07-30 PROCEDURE — C1776 JOINT DEVICE (IMPLANTABLE): HCPCS | Performed by: ORTHOPAEDIC SURGERY

## 2018-07-30 PROCEDURE — 97110 THERAPEUTIC EXERCISES: CPT | Mod: GP

## 2018-07-30 PROCEDURE — 25000132 ZZH RX MED GY IP 250 OP 250 PS 637: Performed by: ORTHOPAEDIC SURGERY

## 2018-07-30 PROCEDURE — 25000125 ZZHC RX 250: Performed by: PHYSICIAN ASSISTANT

## 2018-07-30 PROCEDURE — C1713 ANCHOR/SCREW BN/BN,TIS/BN: HCPCS | Performed by: ORTHOPAEDIC SURGERY

## 2018-07-30 PROCEDURE — 71000012 ZZH RECOVERY PHASE 1 LEVEL 1 FIRST HR: Performed by: ORTHOPAEDIC SURGERY

## 2018-07-30 PROCEDURE — 36000093 ZZH SURGERY LEVEL 4 1ST 30 MIN: Performed by: ORTHOPAEDIC SURGERY

## 2018-07-30 PROCEDURE — 36415 COLL VENOUS BLD VENIPUNCTURE: CPT | Performed by: ORTHOPAEDIC SURGERY

## 2018-07-30 PROCEDURE — 99207 ZZC CONSULT E&M CHANGED TO INITIAL LEVEL: CPT | Performed by: INTERNAL MEDICINE

## 2018-07-30 PROCEDURE — 25000131 ZZH RX MED GY IP 250 OP 636 PS 637: Performed by: NURSE ANESTHETIST, CERTIFIED REGISTERED

## 2018-07-30 PROCEDURE — 37000009 ZZH ANESTHESIA TECHNICAL FEE, EACH ADDTL 15 MIN: Performed by: ORTHOPAEDIC SURGERY

## 2018-07-30 DEVICE — IMP SCR BIOM G7 ACETABULAR DOME 6.5X20MM LOW PRO 010000997: Type: IMPLANTABLE DEVICE | Site: HIP | Status: FUNCTIONAL

## 2018-07-30 DEVICE — IMPLANTABLE DEVICE: Type: IMPLANTABLE DEVICE | Site: HIP | Status: FUNCTIONAL

## 2018-07-30 DEVICE — IMP LINER BIOM G7 ACET NEU ARCOMXL CRSLNK 36MM SZ D 10000739: Type: IMPLANTABLE DEVICE | Site: HIP | Status: FUNCTIONAL

## 2018-07-30 DEVICE — IMP SHELL BIOM G7 ACETAB PPS LIM HOLE 50MM SZ D 010000662: Type: IMPLANTABLE DEVICE | Site: HIP | Status: FUNCTIONAL

## 2018-07-30 DEVICE — IMP SLEEVE BIOM TYPE1 TPR FOR BIOLOX DELTA -6MM 650-1064: Type: IMPLANTABLE DEVICE | Site: HIP | Status: FUNCTIONAL

## 2018-07-30 DEVICE — IMP HEAD FEM BIOM BIOLOX DELTA OPTION 36MM 650-1057: Type: IMPLANTABLE DEVICE | Site: HIP | Status: FUNCTIONAL

## 2018-07-30 RX ORDER — HYDRALAZINE HYDROCHLORIDE 20 MG/ML
2.5-5 INJECTION INTRAMUSCULAR; INTRAVENOUS EVERY 10 MIN PRN
Status: DISCONTINUED | OUTPATIENT
Start: 2018-07-30 | End: 2018-07-30 | Stop reason: HOSPADM

## 2018-07-30 RX ORDER — DEXAMETHASONE SODIUM PHOSPHATE 4 MG/ML
INJECTION, SOLUTION INTRA-ARTICULAR; INTRALESIONAL; INTRAMUSCULAR; INTRAVENOUS; SOFT TISSUE PRN
Status: DISCONTINUED | OUTPATIENT
Start: 2018-07-30 | End: 2018-07-30

## 2018-07-30 RX ORDER — TOLTERODINE 4 MG/1
4 CAPSULE, EXTENDED RELEASE ORAL DAILY
Status: DISCONTINUED | OUTPATIENT
Start: 2018-07-30 | End: 2018-08-01 | Stop reason: HOSPADM

## 2018-07-30 RX ORDER — HYDROXYZINE HYDROCHLORIDE 25 MG/1
25-50 TABLET, FILM COATED ORAL EVERY 6 HOURS PRN
Status: DISCONTINUED | OUTPATIENT
Start: 2018-07-30 | End: 2018-08-01 | Stop reason: HOSPADM

## 2018-07-30 RX ORDER — ALBUTEROL SULFATE 90 UG/1
2 AEROSOL, METERED RESPIRATORY (INHALATION) EVERY 6 HOURS PRN
Status: DISCONTINUED | OUTPATIENT
Start: 2018-07-30 | End: 2018-08-01 | Stop reason: HOSPADM

## 2018-07-30 RX ORDER — PROPOFOL 10 MG/ML
INJECTION, EMULSION INTRAVENOUS CONTINUOUS PRN
Status: DISCONTINUED | OUTPATIENT
Start: 2018-07-30 | End: 2018-07-30

## 2018-07-30 RX ORDER — LABETALOL HYDROCHLORIDE 5 MG/ML
10 INJECTION, SOLUTION INTRAVENOUS
Status: DISCONTINUED | OUTPATIENT
Start: 2018-07-30 | End: 2018-07-30 | Stop reason: HOSPADM

## 2018-07-30 RX ORDER — SODIUM CHLORIDE 9 MG/ML
INJECTION, SOLUTION INTRAVENOUS CONTINUOUS
Status: DISCONTINUED | OUTPATIENT
Start: 2018-07-30 | End: 2018-08-01 | Stop reason: HOSPADM

## 2018-07-30 RX ORDER — ONDANSETRON 2 MG/ML
4 INJECTION INTRAMUSCULAR; INTRAVENOUS EVERY 30 MIN PRN
Status: DISCONTINUED | OUTPATIENT
Start: 2018-07-30 | End: 2018-07-30 | Stop reason: HOSPADM

## 2018-07-30 RX ORDER — HYDROXYZINE HYDROCHLORIDE 25 MG/1
25 TABLET, FILM COATED ORAL 3 TIMES DAILY PRN
Status: DISCONTINUED | OUTPATIENT
Start: 2018-07-30 | End: 2018-08-01 | Stop reason: HOSPADM

## 2018-07-30 RX ORDER — SODIUM CHLORIDE, SODIUM LACTATE, POTASSIUM CHLORIDE, CALCIUM CHLORIDE 600; 310; 30; 20 MG/100ML; MG/100ML; MG/100ML; MG/100ML
INJECTION, SOLUTION INTRAVENOUS CONTINUOUS
Status: DISCONTINUED | OUTPATIENT
Start: 2018-07-30 | End: 2018-07-30 | Stop reason: HOSPADM

## 2018-07-30 RX ORDER — ONDANSETRON 4 MG/1
4 TABLET, ORALLY DISINTEGRATING ORAL EVERY 30 MIN PRN
Status: DISCONTINUED | OUTPATIENT
Start: 2018-07-30 | End: 2018-07-30 | Stop reason: HOSPADM

## 2018-07-30 RX ORDER — LIDOCAINE HYDROCHLORIDE 10 MG/ML
INJECTION, SOLUTION INFILTRATION; PERINEURAL PRN
Status: DISCONTINUED | OUTPATIENT
Start: 2018-07-30 | End: 2018-07-30

## 2018-07-30 RX ORDER — LIDOCAINE 40 MG/G
CREAM TOPICAL
Status: DISCONTINUED | OUTPATIENT
Start: 2018-07-30 | End: 2018-08-01

## 2018-07-30 RX ORDER — AMOXICILLIN 250 MG
1 CAPSULE ORAL 2 TIMES DAILY
Status: DISCONTINUED | OUTPATIENT
Start: 2018-07-30 | End: 2018-08-01 | Stop reason: HOSPADM

## 2018-07-30 RX ORDER — CELECOXIB 200 MG/1
400 CAPSULE ORAL ONCE
Status: COMPLETED | OUTPATIENT
Start: 2018-07-30 | End: 2018-07-30

## 2018-07-30 RX ORDER — MAGNESIUM OXIDE 400 MG/1
400 TABLET ORAL DAILY
Status: DISCONTINUED | OUTPATIENT
Start: 2018-07-30 | End: 2018-08-01 | Stop reason: HOSPADM

## 2018-07-30 RX ORDER — PROPOFOL 10 MG/ML
INJECTION, EMULSION INTRAVENOUS PRN
Status: DISCONTINUED | OUTPATIENT
Start: 2018-07-30 | End: 2018-07-30

## 2018-07-30 RX ORDER — NEOSTIGMINE METHYLSULFATE 1 MG/ML
VIAL (ML) INJECTION PRN
Status: DISCONTINUED | OUTPATIENT
Start: 2018-07-30 | End: 2018-07-30

## 2018-07-30 RX ORDER — ATORVASTATIN CALCIUM 40 MG/1
40 TABLET, FILM COATED ORAL DAILY
Status: DISCONTINUED | OUTPATIENT
Start: 2018-07-30 | End: 2018-08-01 | Stop reason: HOSPADM

## 2018-07-30 RX ORDER — CEFAZOLIN SODIUM 2 G/100ML
2 INJECTION, SOLUTION INTRAVENOUS EVERY 8 HOURS
Status: COMPLETED | OUTPATIENT
Start: 2018-07-30 | End: 2018-07-31

## 2018-07-30 RX ORDER — PANTOPRAZOLE SODIUM 40 MG/1
40 TABLET, DELAYED RELEASE ORAL ONCE
Status: COMPLETED | OUTPATIENT
Start: 2018-07-30 | End: 2018-07-30

## 2018-07-30 RX ORDER — NALOXONE HYDROCHLORIDE 0.4 MG/ML
.1-.4 INJECTION, SOLUTION INTRAMUSCULAR; INTRAVENOUS; SUBCUTANEOUS
Status: DISCONTINUED | OUTPATIENT
Start: 2018-07-30 | End: 2018-08-01 | Stop reason: HOSPADM

## 2018-07-30 RX ORDER — ONDANSETRON 2 MG/ML
4 INJECTION INTRAMUSCULAR; INTRAVENOUS EVERY 6 HOURS PRN
Status: DISCONTINUED | OUTPATIENT
Start: 2018-07-30 | End: 2018-08-01 | Stop reason: HOSPADM

## 2018-07-30 RX ORDER — HYDROXYZINE HYDROCHLORIDE 25 MG/1
25 TABLET, FILM COATED ORAL EVERY 6 HOURS PRN
Status: DISCONTINUED | OUTPATIENT
Start: 2018-07-30 | End: 2018-08-01 | Stop reason: HOSPADM

## 2018-07-30 RX ORDER — LIDOCAINE 40 MG/G
CREAM TOPICAL
Status: DISCONTINUED | OUTPATIENT
Start: 2018-07-30 | End: 2018-07-30 | Stop reason: HOSPADM

## 2018-07-30 RX ORDER — GLUCOSAMINE/D3/BOSWELLIA SERRA 1500MG-400
1 TABLET ORAL DAILY
Status: DISCONTINUED | OUTPATIENT
Start: 2018-07-30 | End: 2018-07-30 | Stop reason: RX

## 2018-07-30 RX ORDER — ONDANSETRON 2 MG/ML
4 INJECTION INTRAMUSCULAR; INTRAVENOUS EVERY 6 HOURS
Status: DISPENSED | OUTPATIENT
Start: 2018-07-30 | End: 2018-07-31

## 2018-07-30 RX ORDER — AMOXICILLIN 250 MG
2 CAPSULE ORAL 2 TIMES DAILY
Status: DISCONTINUED | OUTPATIENT
Start: 2018-07-30 | End: 2018-08-01 | Stop reason: HOSPADM

## 2018-07-30 RX ORDER — FLUTICASONE PROPIONATE 50 MCG
1 SPRAY, SUSPENSION (ML) NASAL DAILY PRN
Status: DISCONTINUED | OUTPATIENT
Start: 2018-07-30 | End: 2018-08-01 | Stop reason: HOSPADM

## 2018-07-30 RX ORDER — FENTANYL CITRATE 50 UG/ML
25-50 INJECTION, SOLUTION INTRAMUSCULAR; INTRAVENOUS
Status: DISCONTINUED | OUTPATIENT
Start: 2018-07-30 | End: 2018-07-30 | Stop reason: HOSPADM

## 2018-07-30 RX ORDER — ONDANSETRON 4 MG/1
4 TABLET, ORALLY DISINTEGRATING ORAL EVERY 6 HOURS PRN
Status: DISCONTINUED | OUTPATIENT
Start: 2018-07-30 | End: 2018-08-01 | Stop reason: HOSPADM

## 2018-07-30 RX ORDER — GLYCOPYRROLATE 0.2 MG/ML
INJECTION, SOLUTION INTRAMUSCULAR; INTRAVENOUS PRN
Status: DISCONTINUED | OUTPATIENT
Start: 2018-07-30 | End: 2018-07-30

## 2018-07-30 RX ORDER — HYDROMORPHONE HYDROCHLORIDE 1 MG/ML
.3-.5 INJECTION, SOLUTION INTRAMUSCULAR; INTRAVENOUS; SUBCUTANEOUS
Status: DISCONTINUED | OUTPATIENT
Start: 2018-07-30 | End: 2018-08-01 | Stop reason: HOSPADM

## 2018-07-30 RX ORDER — CELECOXIB 200 MG/1
200 CAPSULE ORAL 2 TIMES DAILY
Status: COMPLETED | OUTPATIENT
Start: 2018-07-30 | End: 2018-08-01

## 2018-07-30 RX ORDER — HYDROMORPHONE HYDROCHLORIDE 1 MG/ML
.3-.5 INJECTION, SOLUTION INTRAMUSCULAR; INTRAVENOUS; SUBCUTANEOUS EVERY 5 MIN PRN
Status: DISCONTINUED | OUTPATIENT
Start: 2018-07-30 | End: 2018-07-30 | Stop reason: HOSPADM

## 2018-07-30 RX ORDER — CEFAZOLIN SODIUM 1 G/3ML
1 INJECTION, POWDER, FOR SOLUTION INTRAMUSCULAR; INTRAVENOUS SEE ADMIN INSTRUCTIONS
Status: DISCONTINUED | OUTPATIENT
Start: 2018-07-30 | End: 2018-07-30 | Stop reason: HOSPADM

## 2018-07-30 RX ORDER — FENTANYL CITRATE 50 UG/ML
INJECTION, SOLUTION INTRAMUSCULAR; INTRAVENOUS PRN
Status: DISCONTINUED | OUTPATIENT
Start: 2018-07-30 | End: 2018-07-30

## 2018-07-30 RX ORDER — GLYCINE 1.5 G/100ML
SOLUTION IRRIGATION PRN
Status: DISCONTINUED | OUTPATIENT
Start: 2018-07-30 | End: 2018-07-30 | Stop reason: HOSPADM

## 2018-07-30 RX ORDER — PERPHENAZINE 16 MG
1 TABLET ORAL DAILY
Status: DISCONTINUED | OUTPATIENT
Start: 2018-07-30 | End: 2018-07-30 | Stop reason: RX

## 2018-07-30 RX ORDER — OXYCODONE HYDROCHLORIDE 5 MG/1
5-10 TABLET ORAL
Status: DISCONTINUED | OUTPATIENT
Start: 2018-07-30 | End: 2018-08-01 | Stop reason: HOSPADM

## 2018-07-30 RX ORDER — NALOXONE HYDROCHLORIDE 0.4 MG/ML
.1-.4 INJECTION, SOLUTION INTRAMUSCULAR; INTRAVENOUS; SUBCUTANEOUS
Status: ACTIVE | OUTPATIENT
Start: 2018-07-30 | End: 2018-07-31

## 2018-07-30 RX ORDER — LABETALOL HYDROCHLORIDE 5 MG/ML
INJECTION, SOLUTION INTRAVENOUS PRN
Status: DISCONTINUED | OUTPATIENT
Start: 2018-07-30 | End: 2018-07-30

## 2018-07-30 RX ORDER — ONDANSETRON 2 MG/ML
INJECTION INTRAMUSCULAR; INTRAVENOUS PRN
Status: DISCONTINUED | OUTPATIENT
Start: 2018-07-30 | End: 2018-07-30

## 2018-07-30 RX ORDER — ZOLPIDEM TARTRATE 5 MG/1
5 TABLET ORAL
Status: DISCONTINUED | OUTPATIENT
Start: 2018-07-31 | End: 2018-08-01 | Stop reason: HOSPADM

## 2018-07-30 RX ORDER — ACETAMINOPHEN 325 MG/1
975 TABLET ORAL EVERY 8 HOURS
Status: DISCONTINUED | OUTPATIENT
Start: 2018-07-30 | End: 2018-08-01 | Stop reason: HOSPADM

## 2018-07-30 RX ORDER — METHOCARBAMOL 500 MG/1
1000 TABLET, FILM COATED ORAL 3 TIMES DAILY PRN
Status: DISCONTINUED | OUTPATIENT
Start: 2018-07-30 | End: 2018-08-01 | Stop reason: HOSPADM

## 2018-07-30 RX ORDER — CEFAZOLIN SODIUM 2 G/100ML
2 INJECTION, SOLUTION INTRAVENOUS
Status: COMPLETED | OUTPATIENT
Start: 2018-07-30 | End: 2018-07-30

## 2018-07-30 RX ORDER — ACETAMINOPHEN 325 MG/1
650 TABLET ORAL EVERY 4 HOURS PRN
Status: DISCONTINUED | OUTPATIENT
Start: 2018-08-02 | End: 2018-08-01 | Stop reason: HOSPADM

## 2018-07-30 RX ORDER — PROCHLORPERAZINE MALEATE 5 MG
10 TABLET ORAL EVERY 6 HOURS PRN
Status: DISCONTINUED | OUTPATIENT
Start: 2018-07-30 | End: 2018-08-01 | Stop reason: HOSPADM

## 2018-07-30 RX ADMIN — FENTANYL CITRATE 50 MCG: 50 INJECTION INTRAMUSCULAR; INTRAVENOUS at 09:28

## 2018-07-30 RX ADMIN — PHENYLEPHRINE HYDROCHLORIDE 100 MCG: 10 INJECTION, SOLUTION INTRAMUSCULAR; INTRAVENOUS; SUBCUTANEOUS at 08:43

## 2018-07-30 RX ADMIN — LABETALOL HYDROCHLORIDE 5 MG: 5 INJECTION INTRAVENOUS at 07:56

## 2018-07-30 RX ADMIN — ACETAMINOPHEN 975 MG: 325 TABLET, FILM COATED ORAL at 20:39

## 2018-07-30 RX ADMIN — PHENYLEPHRINE HYDROCHLORIDE 50 MCG: 10 INJECTION, SOLUTION INTRAMUSCULAR; INTRAVENOUS; SUBCUTANEOUS at 08:10

## 2018-07-30 RX ADMIN — GLYCOPYRROLATE 0.2 MG: 0.2 INJECTION, SOLUTION INTRAMUSCULAR; INTRAVENOUS at 07:20

## 2018-07-30 RX ADMIN — PROPOFOL 50 MCG/KG/MIN: 10 INJECTION, EMULSION INTRAVENOUS at 07:30

## 2018-07-30 RX ADMIN — SENNOSIDES AND DOCUSATE SODIUM 2 TABLET: 8.6; 5 TABLET ORAL at 20:01

## 2018-07-30 RX ADMIN — ATORVASTATIN CALCIUM 40 MG: 40 TABLET, FILM COATED ORAL at 13:25

## 2018-07-30 RX ADMIN — PHENYLEPHRINE HYDROCHLORIDE 50 MCG: 10 INJECTION, SOLUTION INTRAMUSCULAR; INTRAVENOUS; SUBCUTANEOUS at 08:09

## 2018-07-30 RX ADMIN — CEFAZOLIN SODIUM 2 G: 2 INJECTION, SOLUTION INTRAVENOUS at 16:19

## 2018-07-30 RX ADMIN — LIDOCAINE HYDROCHLORIDE 40 MG: 10 INJECTION, SOLUTION INFILTRATION; PERINEURAL at 07:20

## 2018-07-30 RX ADMIN — ONDANSETRON 4 MG: 2 INJECTION INTRAMUSCULAR; INTRAVENOUS at 19:59

## 2018-07-30 RX ADMIN — ROCURONIUM BROMIDE 10 MG: 10 INJECTION INTRAVENOUS at 07:54

## 2018-07-30 RX ADMIN — Medication 1 G: at 07:29

## 2018-07-30 RX ADMIN — HYDROMORPHONE HYDROCHLORIDE 0.5 MG: 1 INJECTION, SOLUTION INTRAMUSCULAR; INTRAVENOUS; SUBCUTANEOUS at 09:08

## 2018-07-30 RX ADMIN — OXYCODONE HYDROCHLORIDE 5 MG: 5 TABLET ORAL at 13:24

## 2018-07-30 RX ADMIN — HYDROXYZINE HYDROCHLORIDE 50 MG: 25 TABLET ORAL at 20:05

## 2018-07-30 RX ADMIN — OXYCODONE HYDROCHLORIDE 10 MG: 5 TABLET ORAL at 16:34

## 2018-07-30 RX ADMIN — HYDROMORPHONE HYDROCHLORIDE 0.5 MG: 1 INJECTION, SOLUTION INTRAMUSCULAR; INTRAVENOUS; SUBCUTANEOUS at 07:39

## 2018-07-30 RX ADMIN — PHENYLEPHRINE HYDROCHLORIDE 100 MCG: 10 INJECTION, SOLUTION INTRAMUSCULAR; INTRAVENOUS; SUBCUTANEOUS at 08:32

## 2018-07-30 RX ADMIN — PROPOFOL 180 MG: 10 INJECTION, EMULSION INTRAVENOUS at 07:20

## 2018-07-30 RX ADMIN — ROCURONIUM BROMIDE 40 MG: 10 INJECTION INTRAVENOUS at 07:20

## 2018-07-30 RX ADMIN — FENTANYL CITRATE 100 MCG: 50 INJECTION, SOLUTION INTRAMUSCULAR; INTRAVENOUS at 07:20

## 2018-07-30 RX ADMIN — Medication 4 MG: at 09:04

## 2018-07-30 RX ADMIN — Medication 1 G: at 08:46

## 2018-07-30 RX ADMIN — OXYCODONE HYDROCHLORIDE 10 MG: 5 TABLET ORAL at 20:01

## 2018-07-30 RX ADMIN — ONDANSETRON 4 MG: 2 INJECTION INTRAMUSCULAR; INTRAVENOUS at 08:36

## 2018-07-30 RX ADMIN — RANITIDINE 150 MG: 150 TABLET ORAL at 20:01

## 2018-07-30 RX ADMIN — AMITRIPTYLINE HYDROCHLORIDE 25 MG: 25 TABLET, FILM COATED ORAL at 20:05

## 2018-07-30 RX ADMIN — MIDAZOLAM 2 MG: 1 INJECTION INTRAMUSCULAR; INTRAVENOUS at 07:16

## 2018-07-30 RX ADMIN — SODIUM CHLORIDE 1000 ML: 9 INJECTION, SOLUTION INTRAVENOUS at 12:12

## 2018-07-30 RX ADMIN — SODIUM CHLORIDE, POTASSIUM CHLORIDE, SODIUM LACTATE AND CALCIUM CHLORIDE: 600; 310; 30; 20 INJECTION, SOLUTION INTRAVENOUS at 07:17

## 2018-07-30 RX ADMIN — HYDROMORPHONE HYDROCHLORIDE 0.5 MG: 1 INJECTION, SOLUTION INTRAMUSCULAR; INTRAVENOUS; SUBCUTANEOUS at 09:58

## 2018-07-30 RX ADMIN — GLYCOPYRROLATE 0.6 MG: 0.2 INJECTION, SOLUTION INTRAMUSCULAR; INTRAVENOUS at 09:04

## 2018-07-30 RX ADMIN — CELECOXIB 400 MG: 200 CAPSULE ORAL at 06:02

## 2018-07-30 RX ADMIN — TOLTERODINE 4 MG: 4 CAPSULE, EXTENDED RELEASE ORAL at 16:19

## 2018-07-30 RX ADMIN — ONDANSETRON 4 MG: 2 INJECTION INTRAMUSCULAR; INTRAVENOUS at 13:24

## 2018-07-30 RX ADMIN — PHENYLEPHRINE HYDROCHLORIDE 100 MCG: 10 INJECTION, SOLUTION INTRAMUSCULAR; INTRAVENOUS; SUBCUTANEOUS at 08:36

## 2018-07-30 RX ADMIN — MAGNESIUM OXIDE TAB 400 MG (241.3 MG ELEMENTAL MG) 400 MG: 400 (241.3 MG) TAB at 13:25

## 2018-07-30 RX ADMIN — OXYCODONE HYDROCHLORIDE 10 MG: 5 TABLET ORAL at 22:57

## 2018-07-30 RX ADMIN — SERTRALINE HYDROCHLORIDE 150 MG: 50 TABLET ORAL at 13:24

## 2018-07-30 RX ADMIN — PANTOPRAZOLE SODIUM 40 MG: 40 TABLET, DELAYED RELEASE ORAL at 06:02

## 2018-07-30 RX ADMIN — FENTANYL CITRATE 100 MCG: 50 INJECTION, SOLUTION INTRAMUSCULAR; INTRAVENOUS at 07:45

## 2018-07-30 RX ADMIN — HYDROMORPHONE HYDROCHLORIDE 0.5 MG: 1 INJECTION, SOLUTION INTRAMUSCULAR; INTRAVENOUS; SUBCUTANEOUS at 07:47

## 2018-07-30 RX ADMIN — FENTANYL CITRATE 50 MCG: 50 INJECTION, SOLUTION INTRAMUSCULAR; INTRAVENOUS at 07:53

## 2018-07-30 RX ADMIN — SODIUM CHLORIDE, POTASSIUM CHLORIDE, SODIUM LACTATE AND CALCIUM CHLORIDE: 600; 310; 30; 20 INJECTION, SOLUTION INTRAVENOUS at 09:00

## 2018-07-30 RX ADMIN — CELECOXIB 200 MG: 200 CAPSULE ORAL at 20:01

## 2018-07-30 RX ADMIN — HYDROXYZINE HYDROCHLORIDE 50 MG: 25 TABLET ORAL at 13:29

## 2018-07-30 RX ADMIN — FENTANYL CITRATE 50 MCG: 50 INJECTION INTRAMUSCULAR; INTRAVENOUS at 09:41

## 2018-07-30 RX ADMIN — DEXAMETHASONE SODIUM PHOSPHATE 4 MG: 4 INJECTION, SOLUTION INTRA-ARTICULAR; INTRALESIONAL; INTRAMUSCULAR; INTRAVENOUS; SOFT TISSUE at 07:20

## 2018-07-30 RX ADMIN — ACETAMINOPHEN 975 MG: 325 TABLET, FILM COATED ORAL at 13:24

## 2018-07-30 RX ADMIN — LABETALOL HYDROCHLORIDE 5 MG: 5 INJECTION INTRAVENOUS at 07:50

## 2018-07-30 RX ADMIN — HYDROMORPHONE HYDROCHLORIDE 0.5 MG: 1 INJECTION, SOLUTION INTRAMUSCULAR; INTRAVENOUS; SUBCUTANEOUS at 10:07

## 2018-07-30 RX ADMIN — CEFAZOLIN SODIUM 2 G: 2 INJECTION, SOLUTION INTRAVENOUS at 07:16

## 2018-07-30 ASSESSMENT — ACTIVITIES OF DAILY LIVING (ADL)
ADLS_ACUITY_SCORE: 11
ADLS_ACUITY_SCORE: 13
ADLS_ACUITY_SCORE: 13

## 2018-07-30 ASSESSMENT — LIFESTYLE VARIABLES: TOBACCO_USE: 1

## 2018-07-30 NOTE — ANESTHESIA PREPROCEDURE EVALUATION
Anesthesia Evaluation     . Pt has had prior anesthetic.     History of anesthetic complications   - PONV        ROS/MED HX    ENT/Pulmonary:     (+)sleep apnea, tobacco use, asthma uses CPAP , . .    Neurologic:     (+)other neuro disc disease    Cardiovascular:     (+) Dyslipidemia, ----. : . . . :. .       METS/Exercise Tolerance:     Hematologic:     (+) Anemia, -      Musculoskeletal:   (+) arthritis, , , -       GI/Hepatic:         Renal/Genitourinary:     (+) Other Renal/ Genitourinary, incont      Endo:         Psychiatric:     (+) psychiatric history depression      Infectious Disease:         Malignancy:         Other:                     Physical Exam  Normal systems: cardiovascular and pulmonary    Airway   Mallampati: II  TM distance: >3 FB  Neck ROM: full    Dental     Cardiovascular       Pulmonary                     Anesthesia Plan      History & Physical Review  History and physical reviewed and following examination; no interval change.    ASA Status:  2 .    NPO Status:  > 8 hours    Plan for General and ETT with Intravenous and Propofol induction. Maintenance will be Balanced.    PONV prophylaxis:  Ondansetron (or other 5HT-3) and Dexamethasone or Solumedrol       Postoperative Care  Postoperative pain management:  IV analgesics.      Consents  Anesthetic plan, risks, benefits and alternatives discussed with:  Patient.  Use of blood products discussed: Yes.   Use of blood products discussed with Patient.  Consented to blood products.  .                          .

## 2018-07-30 NOTE — IP AVS SNAPSHOT
MRN:[id number]                      After Visit Summary   7/30/2018    Delilah Miranda    MRN: [id number]           Thank you!     Thank you for choosing [facility] for your care. Our goal is always to provide you with excellent care. Hearing back from our patients is one way we can continue to improve our services. Please take a few minutes to complete the written survey that you may receive in the mail after you visit. If you would like to speak to someone directly about your visit please contact Patient Relations at [phone]. Thank you!          Patient Information     Date Of Birth          1960        Designated Caregiver       Most Recent Value    Caregiver    Will someone help with your care after discharge? yes    Name of designated caregiver Dariel Miranda    Phone number of caregiver     Caregiver address same as patient      About your hospital stay     You were admitted on:  July 30, 2018 You last received care in the:  [facility]    You were discharged on:  August 1, 2018        Reason for your hospital stay       Following left total hip arthroplasty                  Who to Call     For medical emergencies, please call 911.  For non-urgent questions about your medical care, please call your primary care provider or clinic, [phone]  For questions related to your surgery, please call your surgery clinic        Attending Provider     Provider Specialty    Bryon Zaragoza MD Orthopaedic Surgery       Primary Care Provider Office Phone # Fax #    Roxana Cuadra PA-C [phone]      After Care Instructions     Activity       Your activity upon discharge: activity as tolerated but no driving until off of daytime narcotics.            Diet       Follow this diet upon discharge: regular            Wound care and dressings       Instructions to care for your wound at home: Leave aquacel dressing in place until  post-op follow-up.  If it becomes loose or soiled then remove and replace with daily dry dressings.                  Follow-up Appointments     Follow-up and recommended labs and tests        Follow up with Darlyn Justin PA-C within 12-16 days from surgery.  If you do not already have a follow up appointment scheduled, please call our [place] office: [phone].  No follow up labs or test are needed.                  Further instructions from your care team       Return to clinic in10-14 days call if no appt made yet    1. Do exercises as instructed by therapist.  2. No hip precautions.per surgeons approach  3. Walk daily increasing distance and time each day by a little.  4. Ice hip for swelling and discomfort.  5. May shower dressing is waterproof and stays in place until follow up Dr visit .  6. Notify your dentist of your implant so you can get antibiotics before any dental work or for any other invasive procedure.      Call your physician if:   1. Fever greater than 101 degrees with body chills or excessive sweating.  2. Increased redness, localized warmth, tenderness, drainage or swelling at dressing site.     3. Pain not controlled with oral pain medications, ice and rest.   4. No bowel movement in 3 days (may use Milk of Magnesia or other over the counter remedy).  5. Chest pain, shortness of breath, and/or calf pain with excessive swelling.  6. Generalized feeling of illness such as nausea/vomiting and/or lightheaded/dizziness .  7. Any other questions or concerns related to your surgery/recovery.      Thank you for allowing [facility] to participate in your cares!!       Pending Results     No orders found from 7/28/2018 to 7/31/2018.            Statement of Approval     Ordered          08/01/18 0934  I have reviewed and agree with all the recommendations and orders detailed in this document.  EFFECTIVE NOW     Approved and electronically signed by:  Darlyn Justin PA-C            "07/31/18 0730  I have reviewed and agree with all the recommendations and orders detailed in this document.  EFFECTIVE NOW     Approved and electronically signed by:  Darlyn Justin PA-C             Admission Information     Date & Time Provider Department Dept. Phone    7/30/2018 Bryon Zaragoza MD [facility] [phone]      Your Vitals Were     Blood Pressure Pulse Temperature Respirations Height Weight    93/52 107 97.4  F (36.3  C) 16 1.575 m (5' 2\") 82.6 kg (182 lb)    Last Period Pulse Oximetry BMI (Body Mass Index)             04/25/2007 95% 33.29 kg/m2         MyChart Information     [facility] gives you secure access to your electronic health record. If you see a primary care provider, you can also send messages to your care team and make appointments. If you have questions, please call your primary care clinic.  If you do not have a primary care provider, please call [phone] and they will assist you.        Care EveryWhere ID     This is your Care EveryWhere ID. This could be used by other organizations to access your [place] medical records  [id number]        Equal Access to Services     JACKIE HANSEN : Hadii elder Lowe, wagailda prabhu, qaybta willalrickey giraldo, mikala dalton. So [facility] [phone].    ATENCIÓN: Si habla español, tiene a rodriguez disposición servicios gratuitos de asistencia lingüística. Davon al [phone].    We comply with applicable federal civil rights laws and [place] laws. We do not discriminate on the basis of race, color, national origin, age, disability, sex, sexual orientation, or gender identity.               Review of your medicines      START taking        Dose / Directions    aspirin 325 MG EC tablet   Replaces:  ASPIRIN 81 81 MG chewable tablet        Take one Aspirin tab twice daily for 5 weeks.   Quantity:  70 tablet   Refills:  0       oxyCODONE-acetaminophen 5-325 MG per tablet   Commonly known " as:  PERCOCET        Dose:  1-2 tablet   Take 1-2 tablets by mouth every 4 hours as needed for severe pain   Quantity:  60 tablet   Refills:  0       senna-docusate 8.6-50 MG per tablet   Commonly known as:  SENOKOT-S;PERICOLACE        Dose:  1 tablet   Take 1 tablet by mouth 2 times daily   Quantity:  30 tablet   Refills:  0         CONTINUE these medicines which may have CHANGED, or have new prescriptions. If we are uncertain of the size of tablets/capsules you have at home, strength may be listed as something that might have changed.        Dose / Directions    amitriptyline 25 MG tablet   Commonly known as:  ELAVIL   This may have changed:    - when to take this  - reasons to take this  - additional instructions   Used for:  Psychophysiological insomnia        TAKE ONE TABLET BY MOUTH AT BEDTIME   Quantity:  90 tablet   Refills:  1         CONTINUE these medicines which have NOT CHANGED        Dose / Directions    albuterol 108 (90 Base) MCG/ACT Inhaler   Commonly known as:  PROAIR HFA   Used for:  Mild intermittent asthma        Dose:  2 puff   Inhale 2 puffs into the lungs every 6 hours as needed INHALE 1 TO 2 PUFFS EVERY 4 TO 6 HOURS AS NEEDED   Quantity:  2 Inhaler   Refills:  3       alpha-lipoic acid 600 MG Caps   Notes to Patient:  Home schedule        Dose:  1 capsule   Take 1 capsule by mouth daily   Refills:  0       atorvastatin 40 MG tablet   Commonly known as:  LIPITOR   Used for:  Hyperlipidemia LDL goal <130        Dose:  40 mg   Take 1 tablet (40 mg) by mouth daily   Quantity:  90 tablet   Refills:  3       Biotin 49989 MCG Tabs   Notes to Patient:  Home schedule        Dose:  1 tablet   Take 1 tablet by mouth daily   Refills:  0       [employer]S TRIPLE MAGNESIUM COMPLEX 400 MG Caps   Generic drug:  Magnesium   Notes to Patient:  Home schedule        Dose:  1 capsule   Take 1 capsule by mouth daily   Refills:  0       fluticasone 50 MCG/ACT spray   Commonly known as:  FLONASE   Notes to Patient:  Home  schedule        Dose:  1 spray   Spray 1 spray into both nostrils daily as needed for rhinitis or allergies   Refills:  0       hydrOXYzine 25 MG tablet   Commonly known as:  ATARAX   Used for:  Situational anxiety        Dose:  25-50 mg   Take 1-2 tablets (25-50 mg) by mouth every 6 hours as needed for anxiety   Quantity:  60 tablet   Refills:  0       ibuprofen 800 MG tablet   Commonly known as:  ADVIL/MOTRIN   Notes to Patient:  Home schedule        Dose:  800 mg   800 mg every 6 hours as needed (Will stop 7 days pre op) prn   Refills:  0       methocarbamol 500 MG tablet   Commonly known as:  ROBAXIN   Used for:  Muscle spasm   Notes to Patient:  Home schedule        Dose:  1000 mg   Take 2 tablets (1,000 mg) by mouth 3 times daily as needed for muscle spasms   Quantity:  90 tablet   Refills:  1       order for DME        Equipment being ordered: Walker Wheels () and Walker () Treatment Diagnosis: Impaired gait s/p MEKA.   Quantity:  1 each   Refills:  0       sertraline 100 MG tablet   Commonly known as:  ZOLOFT   Used for:  Anxiety        TAKE ONE AND ONE-HALF TABLETS  BY MOUTH ONCE DAILY   Quantity:  135 tablet   Refills:  1       VESICARE 5 MG tablet   Used for:  Overactive bladder   Generic drug:  solifenacin   Notes to Patient:  Home schedule        TAKE ONE TABLET BY MOUTH EVERY DAY   Quantity:  90 tablet   Refills:  2         STOP taking     ASPIRIN 81 81 MG chewable tablet   Generic drug:  aspirin   Replaced by:  aspirin 325 MG EC tablet                Where to get your medicines      These medications were sent to [facility] [address] [facility]  [address] [facility] [address]     Phone:  [phone]     aspirin 325 MG EC tablet    hydrOXYzine 25 MG tablet    senna-docusate 8.6-50 MG per tablet         Some of these will need a paper prescription and others can be bought over the counter. Ask your nurse if you have questions.     Bring a  paper prescription for each of these medications     oxyCODONE-acetaminophen 5-325 MG per tablet                Protect others around you: Learn how to safely use, store and throw away your medicines at www.disposemymeds.org.        Information about OPIOIDS     PRESCRIPTION OPIOIDS: WHAT YOU NEED TO KNOW   We gave you an opioid (narcotic) pain medicine. It is important to manage your pain, but opioids are not always the best choice. You should first try all the other options your care team gave you. Take this medicine for as short a time (and as few doses) as possible.     These medicines have risks:    DO NOT drive when on new or higher doses of pain medicine. These medicines can affect your alertness and reaction times, and you could be arrested for driving under the influence (DUI). If you need to use opioids long-term, talk to your care team about driving.    DO NOT operate heave machinery    DO NOT do any other dangerous activities while taking these medicines.     DO NOT drink any alcohol while taking these medicines.      If the opioid prescribed includes acetaminophen, DO NOT take with any other medicines that contain acetaminophen. Read all labels carefully. Look for the word  acetaminophen  or  Tylenol.  Ask your pharmacist if you have questions or are unsure.    You can get addicted to pain medicines, especially if you have a history of addiction (chemical, alcohol or substance dependence). Talk to your care team about ways to reduce this risk.    Store your pills in a secure place, locked if possible. We will not replace any lost or stolen medicine. If you don t finish your medicine, please throw away (dispose) as directed by your pharmacist. The [place] Pollution Control Agency has more information about safe disposal: https://www.pca.state.[place]/living-green/managing-unwanted-medications.     All opioids tend to cause constipation. Drink plenty of water and eat foods that have a lot of fiber, such  as fruits, vegetables, prune juice, apple juice and high-fiber cereal. Take a laxative (Miralax, milk of magnesia, Colace, Senna) if you don t move your bowels at least every other day.              Medication List: This is a list of all your medications and when to take them. Check marks below indicate your daily home schedule. Keep this list as a reference.      Medications           Morning Afternoon Evening Bedtime As Needed    albuterol 108 (90 Base) MCG/ACT Inhaler   Commonly known as:  PROAIR HFA   Inhale 2 puffs into the lungs every 6 hours as needed INHALE 1 TO 2 PUFFS EVERY 4 TO 6 HOURS AS NEEDED                                   alpha-lipoic acid 600 MG Caps   Take 1 capsule by mouth daily   Notes to Patient:  Home schedule                                amitriptyline 25 MG tablet   Commonly known as:  ELAVIL   TAKE ONE TABLET BY MOUTH AT BEDTIME   Last time this was given:  25 mg on 7/30/2018  8:05 PM   Next Dose Due:  Tonight at bedtime                                   aspirin 325 MG EC tablet   Take one Aspirin tab twice daily for 5 weeks.   Next Dose Due:  Begin thursday                                      atorvastatin 40 MG tablet   Commonly known as:  LIPITOR   Take 1 tablet (40 mg) by mouth daily   Last time this was given:  40 mg on 8/1/2018  8:37 AM   Next Dose Due:  thursday                                   Biotin 68292 MCG Tabs   Take 1 tablet by mouth daily   Notes to Patient:  Home schedule                                CVS TRIPLE MAGNESIUM COMPLEX 400 MG Caps   Take 1 capsule by mouth daily   Generic drug:  Magnesium   Notes to Patient:  Home schedule                                fluticasone 50 MCG/ACT spray   Commonly known as:  FLONASE   Spray 1 spray into both nostrils daily as needed for rhinitis or allergies   Notes to Patient:  Home schedule                                   hydrOXYzine 25 MG tablet   Commonly known as:  ATARAX   Take 1-2 tablets (25-50 mg) by mouth every 6 hours  as needed for anxiety   Last time this was given:  25 mg on 8/1/2018  9:59 AM   Next Dose Due:  400pm today                                   ibuprofen 800 MG tablet   Commonly known as:  ADVIL/MOTRIN   800 mg every 6 hours as needed (Will stop 7 days pre op) prn   Notes to Patient:  Home schedule                                   methocarbamol 500 MG tablet   Commonly known as:  ROBAXIN   Take 2 tablets (1,000 mg) by mouth 3 times daily as needed for muscle spasms   Notes to Patient:  Home schedule                                   order for DME   Equipment being ordered: Walker Wheels () and Walker () Treatment Diagnosis: Impaired gait s/p MEKA.                                oxyCODONE-acetaminophen 5-325 MG per tablet   Commonly known as:  PERCOCET   Take 1-2 tablets by mouth every 4 hours as needed for severe pain                                   senna-docusate 8.6-50 MG per tablet   Commonly known as:  SENOKOT-S;PERICOLACE   Take 1 tablet by mouth 2 times daily   Last time this was given:  2 tablets on 8/1/2018  8:40 AM   Next Dose Due:  This evening                                      sertraline 100 MG tablet   Commonly known as:  ZOLOFT   TAKE ONE AND ONE-HALF TABLETS  BY MOUTH ONCE DAILY   Last time this was given:  150 mg on 8/1/2018  8:38 AM   Next Dose Due:  thursday                                   VESICARE 5 MG tablet   TAKE ONE TABLET BY MOUTH EVERY DAY   Generic drug:  solifenacin   Notes to Patient:  Home schedule

## 2018-07-30 NOTE — IP AVS SNAPSHOT
[facility]    [address] [facility] [address]    Phone:  [phone]    Fax:  [phone]                                       After Visit Summary   7/30/2018    Delilah Miranda    MRN: [id number]           After Visit Summary Signature Page     I have received my discharge instructions, and my questions have been answered. I have discussed any challenges I see with this plan with the nurse or doctor.    ..........................................................................................................................................  Patient/Patient Representative Signature      ..........................................................................................................................................  Patient Representative Print Name and Relationship to Patient    ..................................................               ................................................  Date                                            Time    ..........................................................................................................................................  Reviewed by Signature/Title    ...................................................              ..............................................  Date                                                            Time

## 2018-07-30 NOTE — CONSULTS
[facility]  Hospitalist Consult Note  Name: Delilah Miranda    MRN: [id number]  YOB: 1960    Age: 58 year old  Date of admission: 7/30/2018  Primary care provider: Roxana Cuadra     Requesting Physician:  Dr Zaragoza  Reason for consult:  Post-operative medical management         Assessment and Plan:   Delilah Miranda is a 58 year old female with a history of SHIRA on CPAP, anxiety, mild intermittent asthma  who was admitted for elective MEKA for primary left hip OA.    1. DJD left hip s/p MEKA  on POD #0: The patient is doing well, currently has well controlled pain and is hemodynamically stable. Will defer diet, activity, DVT prophylaxis, and pain control to the primary team. Currently the patient is on lovenox. Continue physical and occupational therapy. Continue incentive spirometry and check hemoglobin to evaluate for surgical blood loss and potential need for transfusion.     2. Hx of dyslipidemia- resumed home statins  3. Hx of mild intermittent asthma- not in exacerbation. Resume as need inhalers  4. SHIRA and home appliance resumed.  5. Hx of anxiety- on sertraline.      Code status: full  Prophylaxis: on lovenox  Disposition: defer to ortho service    Thank you for the consultation, we will continue to follow along during the hospitalization. Please page with any questions or concerns.         History of Present Illness:   Delilah Miranda is a 58 year old female who was hospitalized for elective L MEKA with L hip OA . Pre-operative note was fully reviewed and recommendations acknowledged. Op note and anesthesia notes and flow sheets reviewed.     The patient had no complications related to the procedure and has had an unremarkable post-operative course to this point. Currently patient is denying any ongoing pain. No nausea, vomiting, diarrhea or constipation. No fevers, chills, diaphoresis. No chest pain, palpitations, dyspnea. Huerta catheter still in place. Tolerating oral  intake. No excessive somnolence and patient is fully alert and oriented. The patient has no other complaints at this time.                Past Medical History:     Past Medical History:   Diagnosis Date     Anemia     after previous hip replacement     Anxiety 10/31/2013     Degeneration of lumbar or lumbosacral intervertebral disc     knees and hips     Mild intermittent asthma      Other chronic pain     lt hip     Personal history of tobacco use, presenting hazards to health      Pneumonia      PONV (postoperative nausea and vomiting)      Sleep apnea     Will bring CPAP             Past Surgical History:     Past Surgical History:   Procedure Laterality Date     ARTHROPLASTY HIP Right 2018    Procedure: ARTHROPLASTY HIP;  Right total hip arthroplasty using a Biomet G7 acetabulum and Taperloc femoral stem. ;  Surgeon: Bryon Zaragoza MD;  Location: [facility] OR     ARTHROSCOPY KNEE BILATERAL       C NONSPECIFIC PROCEDURE      meniscus      SECTION       excsion of lypoma      times 3     GENITOURINARY SURGERY      bladder sling               Social History:     Social History   Substance Use Topics     Smoking status: Former Smoker     Packs/day: 0.50     Years: 5.00     Types: Cigarettes     Quit date: 3/29/1993     Smokeless tobacco: Never Used      Comment: smoked 1 pk qd in her twenties quit at age 26     Alcohol use 0.0 oz/week     0 Standard drinks or equivalent per week      Comment: 3 beers monthly             Family History:   Family history was fully reviewed and non-contributory in this case.          Allergies:     Allergies   Allergen Reactions     No Known Drug Allergies      Metal [Staples] Rash             Medications:     Prior to Admission medications    Medication Sig Last Dose Taking? Auth Provider   alpha-lipoic acid 600 MG CAPS Take 1 capsule by mouth daily 2018 Yes Reported, Patient   amitriptyline (ELAVIL) 25 MG tablet TAKE ONE TABLET BY MOUTH AT BEDTIME  Patient taking  differently: nightly as needed  7/29/2018 at Unknown time Yes Roxana Cuadra PA-C   aspirin (ASPIRIN 81) 81 MG chewable tablet Take 81 mg by mouth daily 7/20/2018 Yes Reported, Patient   atorvastatin (LIPITOR) 40 MG tablet Take 1 tablet (40 mg) by mouth daily 7/29/2018 at Unknown time Yes Roxana Cuadra PA-C   Biotin 75804 MCG TABS Take 1 tablet by mouth daily 7/22/2018 Yes Reported, Patient   fluticasone (FLONASE) 50 MCG/ACT spray Spray 1 spray into both nostrils daily as needed for rhinitis or allergies 7/16/2018 Yes Reported, Patient   hydrOXYzine (ATARAX) 25 MG tablet Take 1-2 tablets (25-50 mg) by mouth every 6 hours as needed for anxiety 7/29/2018 at Unknown time Yes Roxana Cuadra PA-C   ibuprofen (ADVIL/MOTRIN) 800 MG tablet 800 mg every 6 hours as needed (Will stop 7 days pre op) prn 7/20/2018 Yes Reported, Patient   Magnesium (CVS TRIPLE MAGNESIUM COMPLEX) 400 MG CAPS Take 1 capsule by mouth daily 7/21/2018 Yes Reported, Patient   methocarbamol (ROBAXIN) 500 MG tablet Take 2 tablets (1,000 mg) by mouth 3 times daily as needed for muscle spasms 7/22/2018 Yes Roxana Cuadra PA-C   sertraline (ZOLOFT) 100 MG tablet TAKE ONE AND ONE-HALF TABLETS  BY MOUTH ONCE DAILY 7/29/2018 at Unknown time Yes Roxana Cuadra PA-C   VESICARE 5 MG tablet TAKE ONE TABLET BY MOUTH EVERY DAY 7/29/2018 at Unknown time Yes Roxana Cuadra PA-C   albuterol (ALBUTEROL) 108 (90 BASE) MCG/ACT inhaler Inhale 2 puffs into the lungs every 6 hours as needed INHALE 1 TO 2 PUFFS EVERY 4 TO 6 HOURS AS NEEDED More than a month at Unknown time  Roxana Cuadra PA-C   order for DME Equipment being ordered: Walker Wheels () and Walker ()  Treatment Diagnosis: Impaired gait s/p MEKA.   Bryon Zaragoza MD       Current hospital administered medication list (MAR) also reviewed.          Review of Systems:   A comprehensive greater than 10 system review of systems was carried out.  Pertinent positives and  "negatives are noted above.  Otherwise negative for contributory info.            Physical Exam:   Blood pressure 142/84, pulse 78, temperature 98.2  F (36.8  C), temperature source Oral, resp. rate 16, height 1.575 m (5' 2\"), weight 82.6 kg (182 lb), last menstrual period 04/25/2007, SpO2 95 %, not currently breastfeeding.  Exam:  GENERAL: No apparent distress. Awake, alert, and fully oriented.  HEENT: Normocephalic, atraumatic. Extraocular movements intact.  CARDIOVASCULAR: Regular rate and rhythm without murmurs or rubs. No S3.  PULMONARY: Clear bilaterally.  ABDOMINAL: Soft, non-tender, non-distended. Bowel sounds normoactive. No hepatosplenomegaly.  EXTREMITIES: No cyanosis or clubbing. No edema.  NEUROLOGICAL: CN 2-12 grossly intact, no focal neurological deficits.  DERMATOLOGICAL: No rash, ulcer, ecchymoses, jaundice.         Data:   Imaging:  Reviewed.    EKG/Telemetry:  None seen    Labs: Reviewed.     Recent Labs  Lab 07/30/18  1226          Recent Labs  Lab 07/30/18  1226   CR 0.58   GFRESTIMATED >90   GFRESTBLACK >90     No results for input(s): SED, CRP in the last 168 hours.  No results for input(s): GLC, BGM in the last 168 hours.  No results for input(s): COLOR, APPEARANCE, URINEGLC, URINEBILI, URINEKETONE, SG, UBLD, URINEPH, PROTEIN, UROBILINOGEN, NITRITE, LEUKEST, RBCU, WBCU in the last 168 hours.      "

## 2018-07-30 NOTE — BRIEF OP NOTE
Brief Operative Note    Pre-operative diagnosis: DJD   Post-operative diagnosis same   Procedure: Procedure(s):  Left Total hip Arthroplasty   Surgeon request Choerose Anesthesia  - Wound Class: I-Clean   Surgeon(s): Surgeon(s) and Role:     * Bryon Zaragoza MD - Primary     * Darlyn Justin PA-C   Estimated blood loss: 100 mL    Specimens: * No specimens in log *   Findings: See dictation

## 2018-07-30 NOTE — PROGRESS NOTES
07/30/18 1500   Quick Adds   Type of Visit Initial PT Evaluation   Living Environment   Lives With spouse   Living Arrangements house   Home Accessibility stairs to enter home;stairs within home   Number of Stairs to Enter Home 2  (No rail)   Number of Stairs Within Home 14  (One rail)   Transportation Available car;family or friend will provide   Living Environment Comment  planning to be home for the first few days to assist as needed.    Self-Care   Usual Activity Tolerance good   Current Activity Tolerance fair   Equipment Currently Used at Home cane, straight;walker, rolling  (Comfort height toilets, FWW and4WW)   Functional Level Prior   Ambulation 0-->independent  (Was using a 4WW in the community)   Transferring 0-->independent   Toileting 0-->independent   Bathing 0-->independent   Dressing 2-->assistive person  ( helped with donning socks)   Fall history within last six months no  (Pt does have balance concerns at baseline)   General Information   Onset of Illness/Injury or Date of Surgery - Date 07/30/18   Referring Physician MD Nik   Patient/Family Goals Statement Return home   Pertinent History of Current Problem (include personal factors and/or comorbidities that impact the POC) 58 year old s/p L MEKA.    Precautions/Limitations fall precautions   Weight-Bearing Status - LLE weight-bearing as tolerated   Cognitive Status Examination   Orientation orientation to person, place and time   Level of Consciousness alert   Follows Commands and Answers Questions 100% of the time   Pain Assessment   Patient Currently in Pain Yes, see Vital Sign flowsheet  (4/10 L hip)   Range of Motion (ROM)   ROM Comment Decreased LLE AROM secondary to weakness, pain. Other extremities WFLs.    Strength   Strength Comments Fair quad set on the L LE; no buckling with functional mobility.    Bed Mobility   Bed Mobility Comments Supine to sit with min A   Transfer Skills   Transfer Comments Sit to/from stand  "with CGA.    Gait   Gait Comments Ambulates 5' with FWW and CGA.    Balance   Balance Comments Requires bilat UE support on FWW.    Sensory Examination   Sensory Perception Comments Pt denies burning, numbness and tingling. Pt does report L LE heaviness.    Modality Interventions   Planned Modality Interventions Cryotherapy   Planned Modality Interventions Comments PRN   General Therapy Interventions   Planned Therapy Interventions bed mobility training;gait training;ROM;strengthening;transfer training   Clinical Impression   Criteria for Skilled Therapeutic Intervention yes, treatment indicated   PT Diagnosis Impaired gait   Influenced by the following impairments Pain, decreased L LE AROM/strength, impaired balance   Functional limitations due to impairments Decreased bed mobility, transfers and ambulation   Clinical Presentation Stable/Uncomplicated   Clinical Presentation Rationale Stable.    Clinical Decision Making (Complexity) Low complexity   Therapy Frequency` 2 times/day   Predicted Duration of Therapy Intervention (days/wks) 3 days   Anticipated Discharge Disposition Home with Assist   Risk & Benefits of therapy have been explained Yes   Patient, Family & other staff in agreement with plan of care Yes   [facility] [employer] TM \"6 Clicks\"   2016, Trustees of [facility], under license to [employer]  All rights reserved.   6 Clicks Short Forms Basic Mobility Inpatient Short Form   [facility] AM-PAC  \"6 Clicks\" V.2 Basic Mobility Inpatient Short Form   1. Turning from your back to your side while in a flat bed without using bedrails? 3 - A Little   2. Moving from lying on your back to sitting on the side of a flat bed without using bedrails? 3 - A Little   3. Moving to and from a bed to a chair (including a wheelchair)? 3 - A Little   4. Standing up from a chair using your arms (e.g., wheelchair, or bedside chair)? 3 - A Little   5. To walk in hospital room? 3 - A Little   6. Climbing 3-5 " steps with a railing? 3 - A Little   Basic Mobility Raw Score (Score out of 24.Lower scores equate to lower levels of function) 18   Total Evaluation Time   Total Evaluation Time (Minutes) 9

## 2018-07-30 NOTE — PLAN OF CARE
Problem: Patient Care Overview  Goal: Plan of Care/Patient Progress Review  Outcome: Improving  Pt arrived to floor about 1100, asleep. Jarad to arouse by voice. Pt very lethargic. Oriented to room and call light. IVF. CMS intact. bipap applied with 4L. Will defer education until pt is more awake and alert. Continuous pulse ox on, vitals taken per unit routine. Will continue to monitor.

## 2018-07-30 NOTE — ANESTHESIA POSTPROCEDURE EVALUATION
Patient: Delilah Miranda    Procedure(s):  Left Total hip Arthroplasty   Surgeon request Naty Anesthesia  - Wound Class: I-Clean    Diagnosis:DJD  Diagnosis Additional Information: EUGENIA Zaragoza    Anesthesia Type:  General, ETT    Note:  Anesthesia Post Evaluation    Patient location during evaluation: PACU  Patient participation: Able to fully participate in evaluation  Level of consciousness: awake  Pain management: adequate  Airway patency: patent  Cardiovascular status: acceptable  Respiratory status: acceptable  Hydration status: acceptable  PONV: none     Anesthetic complications: None          Last vitals:  Vitals:    07/30/18 1015 07/30/18 1028 07/30/18 1030   BP: (!) 145/98 (!) 139/96 139/84   Pulse:      Resp: 12 12    Temp:  97.9  F (36.6  C)    SpO2: 97% 97%          Electronically Signed By: Jamie Marcelino MD  July 30, 2018  10:44 AM

## 2018-07-30 NOTE — PLAN OF CARE
Problem: Patient Care Overview  Goal: Plan of Care/Patient Progress Review  PT: Orders received, evaluation completed and treatment initiated. 58 year old female s/p L MEKA. No precautions. Pt reports IND with mobility in her home, mod I outside the home with a 4WW. Pt lives with her  who can provide assist as needed on discharge. Pt lives in a home with 2 stairs (no rail) to enter and an additional 14 (one rail) to the bedroom. Has both a 4WW and a FWW.     Discharge Planner PT   Patient plan for discharge: Home  Current status: Supine to/from sit with min A. Sit to/from stand with CGA. Ambulates 125' with FWW and CGA progressing to SBA. No L LE buckling. Tolerates L MEKA exercises well.   Barriers to return to prior living situation: Stairs - will address in therapy.   Recommendations for discharge: Home with assist from  as needed  Rationale for recommendations: Pt moving well. Anticipate continued improvement with mobility and ability to meet goals with continued IP PT.        Entered by: Bishnu Palacios 07/30/2018 3:54 PM

## 2018-07-30 NOTE — ANESTHESIA CARE TRANSFER NOTE
Patient: Delilah Miranda    Procedure(s):  Left Total hip Arthroplasty   Surgeon request Chocie Anesthesia  - Wound Class: I-Clean    Diagnosis: DJD  Diagnosis Additional Information: No value filed.    Anesthesia Type:   General, ETT     Note:    Patient transferred to:PACU  Comments: Pt spont resps oral airway suctioned ETT removed to PACU VSS Report to RNHandoff Report: Identifed the Patient, Identified the Reponsible Provider, Reviewed the pertinent medical history, Discussed the surgical course, Reviewed Intra-OP anesthesia mangement and issues during anesthesia, Set expectations for post-procedure period and Allowed opportunity for questions and acknowledgement of understanding      Vitals: (Last set prior to Anesthesia Care Transfer)    CRNA VITALS  7/30/2018 0848 - 7/30/2018 0922      7/30/2018             Resp Rate (observed): (!)  7                Electronically Signed By: SHERWIN Chua CRNA  July 30, 2018  9:22 AM

## 2018-07-30 NOTE — OP NOTE
Procedure Date: 07/30/2018      PREOPERATIVE DIAGNOSIS:  Primary left hip osteoarthritis.      POSTOPERATIVE DIAGNOSIS:  Primary left hip osteoarthritis.      PROCEDURE:  Left total hip arthroplasty using a Biomet G7 acetabulum and Taperloc femoral stem.      SURGEON:  Karthik Zaragoza MD       FIRST ASSISTANT:  Darlyn Justin PA-C      INDICATIONS:  Ms. Miranda is a very pleasant 58-year-old female who has had ongoing left hip pain for some time now, failed conservative management with oral analgesics, activity  modifications and injections, and now wishes to proceed with operative intervention.  We had a  long discussion about the risks, benefits and alternatives of total hip arthroplasty.  She understands and wished to proceed with surgery.        NARRATIVE EVENTS:  After thorough preop evaluation and proper identification of patient and extremity to be operated on, Ms. Miranda was taken to the operating room where she underwent general anesthetic, placed in the left lateral decubitus position.  The left hip was prepped and draped in the usual sterile manner.  After appropriate surgical pause to confirm the patient's extremity to be operated on, 30 minutes after patient received 2 g of Ancef, her left hip was approached through a lateral incision centered over the greater trochanter.  Skin and soft tissue were sharply dissected down to the tensor fascia.  Fascia was then split in line with the fibers in line with the femur and taken town to the trochanteric bursa.  The bursal tissue was removed, the anterior one-third gluteus medius removed off the greater trochanter in a modified Hardinge fashion.  This extended the joint capsule which was T'd and the femoral head was surgically dislocated from the acetabulum.  At this point, we made our femoral neck osteotomy 12 mm proximal to the lesser trochanter in accordance with our preoperative templating.  We removed the femoral head from the field, exposed the acetabulum,  and removed the ligamentum  teres, transverse acetabular ligament and labrum, sequentially reamed the acetabulum up to fit a size 50 Biomet G7 acetabular component.  We impacted this into position with approximately 40 degrees of abduction, 20 degrees of anteversion.  Once we had good primary stability of the cup, we augmented this with 2 screws, 1 in the posterior column, 1 in the inner table of the pelvis.  Once this was done, we then placed the polyethylene liner to fit a size 36 mm femoral head.        We then paid our attention to the femur.  Here we removed the bone from the piriformis fossa using entry reamer, lateralized and sequentially broached up to fit a size 7 Biomet Taperloc femoral stem with a standard neck offset and a -6 neck length.  We had good stability, full range of motion, appropriate soft tissue tension and stability, so at this point, we then impacted a size 7 Taperloc femoral stem with standard neck offset into position.  Once this was solidly into position, we then retrialed our femoral head, found that a -6 neck length on a 36 mm femoral head gave us the best stability, full range of motion, soft tissue tension and balance.  So a 36 mm ceramic femoral head with a -6 neck length was impacted onto the trunnion.  The hip was thoroughly irrigated with both saline and IrriSept solution and reduced and then we closed the joint capsule with interrupted 0 Vicryl sutures, closed the abductor with #5 Ethibond sutures through bone tunnels, closed the tensor fascia with interrupted and running 0 Vicryl suture and closed skin and soft tissues with absorbable sutures and nylon sutures in the skin.  The patient was placed in a well-padded postoperative dressing and taken to the recovery room in stable condition.  She tolerated the procedure without difficulty.         DARIANA GRAVES MD             D: 07/30/2018   T: 07/30/2018   MT: ALICJA      Name:     STEPHEN WHITTINGTON   MRN:      [id number]         Account:        [id number]   :      1960           Procedure Date: 2018      Document: [id number]

## 2018-07-31 ENCOUNTER — APPOINTMENT (OUTPATIENT)
Dept: PHYSICAL THERAPY | Facility: CLINIC | Age: 58
DRG: 470 | End: 2018-07-31
Attending: ORTHOPAEDIC SURGERY
Payer: COMMERCIAL

## 2018-07-31 ENCOUNTER — APPOINTMENT (OUTPATIENT)
Dept: OCCUPATIONAL THERAPY | Facility: CLINIC | Age: 58
DRG: 470 | End: 2018-07-31
Attending: ORTHOPAEDIC SURGERY
Payer: COMMERCIAL

## 2018-07-31 LAB — HGB BLD-MCNC: 8.9 G/DL (ref 11.7–15.7)

## 2018-07-31 PROCEDURE — 85018 HEMOGLOBIN: CPT | Performed by: ORTHOPAEDIC SURGERY

## 2018-07-31 PROCEDURE — 97165 OT EVAL LOW COMPLEX 30 MIN: CPT | Mod: GO

## 2018-07-31 PROCEDURE — 97110 THERAPEUTIC EXERCISES: CPT | Mod: GP

## 2018-07-31 PROCEDURE — 25000128 H RX IP 250 OP 636: Performed by: ORTHOPAEDIC SURGERY

## 2018-07-31 PROCEDURE — 40000193 ZZH STATISTIC PT WARD VISIT

## 2018-07-31 PROCEDURE — 12000000 ZZH R&B MED SURG/OB

## 2018-07-31 PROCEDURE — 40000133 ZZH STATISTIC OT WARD VISIT

## 2018-07-31 PROCEDURE — 97116 GAIT TRAINING THERAPY: CPT | Mod: GP

## 2018-07-31 PROCEDURE — 25000132 ZZH RX MED GY IP 250 OP 250 PS 637: Performed by: ORTHOPAEDIC SURGERY

## 2018-07-31 PROCEDURE — 97530 THERAPEUTIC ACTIVITIES: CPT | Mod: GO

## 2018-07-31 PROCEDURE — 99232 SBSQ HOSP IP/OBS MODERATE 35: CPT | Performed by: HOSPITALIST

## 2018-07-31 PROCEDURE — 97535 SELF CARE MNGMENT TRAINING: CPT | Mod: GO

## 2018-07-31 PROCEDURE — 25000132 ZZH RX MED GY IP 250 OP 250 PS 637: Performed by: HOSPITALIST

## 2018-07-31 PROCEDURE — 36415 COLL VENOUS BLD VENIPUNCTURE: CPT | Performed by: ORTHOPAEDIC SURGERY

## 2018-07-31 RX ORDER — FERROUS SULFATE 325(65) MG
325 TABLET ORAL 2 TIMES DAILY WITH MEALS
Status: DISCONTINUED | OUTPATIENT
Start: 2018-07-31 | End: 2018-08-01 | Stop reason: HOSPADM

## 2018-07-31 RX ORDER — AMOXICILLIN 250 MG
1 CAPSULE ORAL 2 TIMES DAILY
Qty: 30 TABLET | Refills: 0 | Status: ON HOLD | OUTPATIENT
Start: 2018-07-31 | End: 2018-11-16

## 2018-07-31 RX ORDER — HYDROXYZINE HYDROCHLORIDE 25 MG/1
25-50 TABLET, FILM COATED ORAL EVERY 6 HOURS PRN
Qty: 60 TABLET | Refills: 0 | Status: ON HOLD | OUTPATIENT
Start: 2018-07-31 | End: 2018-11-16

## 2018-07-31 RX ORDER — OXYCODONE AND ACETAMINOPHEN 5; 325 MG/1; MG/1
1-2 TABLET ORAL EVERY 4 HOURS PRN
Qty: 60 TABLET | Refills: 0 | Status: SHIPPED | OUTPATIENT
Start: 2018-07-31 | End: 2018-10-30

## 2018-07-31 RX ADMIN — OXYCODONE HYDROCHLORIDE 10 MG: 5 TABLET ORAL at 19:09

## 2018-07-31 RX ADMIN — OXYCODONE HYDROCHLORIDE 10 MG: 5 TABLET ORAL at 14:32

## 2018-07-31 RX ADMIN — ENOXAPARIN SODIUM 40 MG: 40 INJECTION SUBCUTANEOUS at 07:43

## 2018-07-31 RX ADMIN — SENNOSIDES AND DOCUSATE SODIUM 2 TABLET: 8.6; 5 TABLET ORAL at 19:32

## 2018-07-31 RX ADMIN — ACETAMINOPHEN 975 MG: 325 TABLET, FILM COATED ORAL at 12:55

## 2018-07-31 RX ADMIN — OXYCODONE HYDROCHLORIDE 10 MG: 5 TABLET ORAL at 08:30

## 2018-07-31 RX ADMIN — ONDANSETRON 4 MG: 2 INJECTION INTRAMUSCULAR; INTRAVENOUS at 08:19

## 2018-07-31 RX ADMIN — ACETAMINOPHEN 975 MG: 325 TABLET, FILM COATED ORAL at 04:41

## 2018-07-31 RX ADMIN — TOLTERODINE 4 MG: 4 CAPSULE, EXTENDED RELEASE ORAL at 08:23

## 2018-07-31 RX ADMIN — CELECOXIB 200 MG: 200 CAPSULE ORAL at 19:33

## 2018-07-31 RX ADMIN — SENNOSIDES AND DOCUSATE SODIUM 1 TABLET: 8.6; 5 TABLET ORAL at 08:23

## 2018-07-31 RX ADMIN — CEFAZOLIN SODIUM 2 G: 2 INJECTION, SOLUTION INTRAVENOUS at 00:47

## 2018-07-31 RX ADMIN — MAGNESIUM OXIDE TAB 400 MG (241.3 MG ELEMENTAL MG) 400 MG: 400 (241.3 MG) TAB at 08:23

## 2018-07-31 RX ADMIN — CELECOXIB 200 MG: 200 CAPSULE ORAL at 08:22

## 2018-07-31 RX ADMIN — SODIUM CHLORIDE: 9 INJECTION, SOLUTION INTRAVENOUS at 00:49

## 2018-07-31 RX ADMIN — ATORVASTATIN CALCIUM 40 MG: 40 TABLET, FILM COATED ORAL at 08:22

## 2018-07-31 RX ADMIN — HYDROXYZINE HYDROCHLORIDE 25 MG: 25 TABLET ORAL at 21:16

## 2018-07-31 RX ADMIN — OXYCODONE HYDROCHLORIDE 10 MG: 5 TABLET ORAL at 04:41

## 2018-07-31 RX ADMIN — SERTRALINE HYDROCHLORIDE 150 MG: 50 TABLET ORAL at 08:23

## 2018-07-31 RX ADMIN — FERROUS SULFATE TAB 325 MG (65 MG ELEMENTAL FE) 325 MG: 325 (65 FE) TAB at 17:25

## 2018-07-31 RX ADMIN — RANITIDINE 150 MG: 150 TABLET ORAL at 08:23

## 2018-07-31 RX ADMIN — OXYCODONE HYDROCHLORIDE 10 MG: 5 TABLET ORAL at 11:22

## 2018-07-31 RX ADMIN — HYDROXYZINE HYDROCHLORIDE 25 MG: 25 TABLET ORAL at 11:23

## 2018-07-31 RX ADMIN — RANITIDINE 150 MG: 150 TABLET ORAL at 19:33

## 2018-07-31 RX ADMIN — ACETAMINOPHEN 975 MG: 325 TABLET, FILM COATED ORAL at 21:16

## 2018-07-31 ASSESSMENT — ACTIVITIES OF DAILY LIVING (ADL)
ADLS_ACUITY_SCORE: 12
ADLS_ACUITY_SCORE: 12
ADLS_ACUITY_SCORE: 13
ADLS_ACUITY_SCORE: 12

## 2018-07-31 ASSESSMENT — PAIN DESCRIPTION - DESCRIPTORS: DESCRIPTORS: SHARP

## 2018-07-31 NOTE — PLAN OF CARE
Problem: Patient Care Overview  Goal: Plan of Care/Patient Progress Review  Discharge Planner PT   Patient plan for discharge: Home  Current status: Supine to/from sit with SBA. Sit to/from stand with CGA. Ambulates 125' x 2 reps with FWW and SBA progressing to supervision. Performs 4 stairs x 2 reps with bilateral rail and CGA.   Barriers to return to prior living situation: Stairs - will address in therapy.   Recommendations for discharge: Home with assist from  as needed  Rationale for recommendations: Pt moving well. Anticipate continued improvement with mobility and ability to meet goals with continued IP PT.        Entered by: Bishnu Bowell 07/31/2018 10:13 AM

## 2018-07-31 NOTE — PROGRESS NOTES
07/31/18 1223   Quick Adds   Type of Visit Initial Occupational Therapy Evaluation   Living Environment   Lives With spouse   Living Arrangements house   Home Accessibility stairs to enter home;stairs within home   Number of Stairs to Enter Home 2  (from garage)   Number of Stairs Within Home 10  (5 to landing, then 5 to upstairs)   Stair Railings at Home inside, present on right side   Transportation Available car;family or friend will provide   Living Environment Comment Pt lives with her supportive spouse in a multi-level home; her spouse has taken Fri off work to assist as needed   Self-Care   Dominant Hand right   Usual Activity Tolerance good   Current Activity Tolerance moderate   Regular Exercise no   Equipment Currently Used at Home cane, straight;walker, rolling;grab bar  (comfort height toilet)   Activity/Exercise/Self-Care Comment Pt has grab bars in the walk-in shower. She had a previous R MEKA in April of this year.   Functional Level Prior   Ambulation 0-->independent   Transferring 0-->independent   Toileting 0-->independent   Bathing 0-->independent   Dressing 2-->assistive person   Eating 0-->independent   Communication 0-->understands/communicates without difficulty   Swallowing 0-->swallows foods/liquids without difficulty   Cognition 0 - no cognition issues reported   Fall history within last six months no   Which of the above functional risks had a recent onset or change? toileting;bathing;dressing   Prior Functional Level Comment Pt was IND in most ADLs PTA; needed assist from spouse for socks   General Information   Onset of Illness/Injury or Date of Surgery - Date 07/30/18   Referring Physician Bryon Zaragoza MD   Patient/Family Goals Statement return home Thursday   Additional Occupational Profile Info/Pertinent History of Current Problem Pt is a 59 yo female seen POD #1 s/p L MEKA   Precautions/Limitations fall precautions;other (see comments)  (no hip precautions secondary to surgical  appoach)   Weight-Bearing Status - LLE weight-bearing as tolerated   General Info Comments Alert, pleasant   Cognitive Status Examination   Orientation orientation to person, place and time   Cognitive Comment No cogntive deficits identified   Sensory Examination   Sensory Comments denies n/t   Pain Assessment   Patient Currently in Pain (pt states pain is tolerable)   Integumentary/Edema   Integumentary/Edema Comments not observed   Range of Motion (ROM)   ROM Comment B UE WFL; not formally assessed    Strength   Strength Comments B UE WFL; not formally assessed   Mobility   Bed Mobility Comments not observed   Transfer Skills   Transfer Comments SBA with 2ww   Transfer Skill: Sit to Stand   Level of [place]: Sit/Stand stand-by assist   Physical Assist/Nonphysical Assist: Sit/Stand supervision   Transfer Skill: Sit to Stand weight-bearing as tolerated   Assistive Device for Transfer: Sit/Stand rolling walker   Transfer Skill: Toilet Transfer   Level of [place]: Toilet stand-by assist   Physical Assist/Nonphysical Assist: Toilet supervision;verbal cues   Weight-Bearing Restrictions: Toilet weight-bearing as tolerated   Assistive Device rolling walker;grab bars   Balance   Balance Comments good balance observed   Upper Body Dressing   Level of [place]: Dress Upper Body independent   Lower Body Dressing   Level of [place]: Dress Lower Body moderate assist (50% patients effort)   Physical Assist/Nonphysical Assist: Dress Lower Body 1 person assist   Grooming   Level of [place]: Grooming stand-by assist   Physical Assist/Nonphysical Assist: Grooming supervision;verbal cues   Eating/Self Feeding   Level of [place]: Eating independent   Instrumental Activities of Daily Living (IADL)   Previous Responsibilities meal prep;driving;finances;medication management  (pet care)   IADL Comments Pt's spouse can manage all IADls as needed, including driving. Pt has friends/family in the area that can  "also assist as needed. Pt is on long term disability and is not currently working.   Activities of Daily Living Analysis   Impairments Contributing to Impaired Activities of Daily Living pain;post surgical precautions   General Therapy Interventions   Planned Therapy Interventions ADL retraining;progressive activity/exercise   Clinical Impression   Criteria for Skilled Therapeutic Interventions Met yes, treatment indicated   OT Diagnosis Decreased ADL/IADLs   Influenced by the following impairments pain;post surgical precautions   Assessment of Occupational Performance 3-5 Performance Deficits   Identified Performance Deficits ADL/IADLs: dressing, bathing, toileting, driving   Clinical Decision Making (Complexity) Low complexity   Therapy Frequency daily   Predicted Duration of Therapy Intervention (days/wks) 2 days   Anticipated Equipment Needs at Discharge sock aide  (flexible sock-aid)   Anticipated Discharge Disposition Home   Risks and Benefits of Treatment have been explained. Yes   Patient, Family & other staff in agreement with plan of care Yes   Clinical Impression Comments Agreeable to session   [facility] TM \"6 Clicks\"   2016, Trustees of [facility], under license to [employer].  All rights reserved.   6 Clicks Short Forms Daily Activity Inpatient Short Form   [facility]  \"6 Clicks\" Daily Activity Inpatient Short Form   1. Putting on and taking off regular lower body clothing? 3 - A Little   2. Bathing (including washing, rinsing, drying)? 3 - A Little   3. Toileting, which includes using toilet, bedpan or urinal? 3 - A Little   4. Putting on and taking off regular upper body clothing? 4 - None   5. Taking care of personal grooming such as brushing teeth? 4 - None   6. Eating meals? 4 - None   Daily Activity Raw Score (Score out of 24.Lower scores equate to lower levels of function) 21   Total Evaluation Time   Total Evaluation Time (Minutes) 8     "

## 2018-07-31 NOTE — PROGRESS NOTES
"[facility]  Hospitalist Progress Note  Patient Name: Delilah Miranda    MRN: [id number]  Provider:  Elham Domingo MD  Date:07/31/2018      Requesting Physician:  Dr Zaragoza  Reason for consult:  Post-operative medical management    Delilah Miranda is a 58 year old female with a history of SHIRA on CPAP, anxiety, mild intermittent asthma  who was admitted for elective MEKA for primary left hip OA.       Assessment and Plan:      DJD left hip s/p MEKA  on POD #0: The patient is doing well, currently has well controlled pain and is hemodynamically stable. Will defer diet, activity, DVT prophylaxis, and pain control to the primary team. Currently the patient is on lovenox. Continue physical and occupational therapy. Continue incentive spirometry and check hemoglobin to evaluate for surgical blood loss and potential need for transfusion.     ABLA: Hb dropped from 124.4->8.7. Pt has no symptoms. Last surgery in April (R MEKA) had similar drop in Hb and did not require blood transfusion  --cont to monitor for symptoms   --will start ferrous sulphate     Hx of dyslipidemia- resumed home statins  Hx of mild intermittent asthma- not in exacerbation. Resume as need inhalers  SHIRA and home appliance resumed.  Hx of anxiety- on sertraline.        Code status: full  Prophylaxis: on lovenox  Disposition: defer to ortho service     Thank you for the consultation, we will continue to follow along during the hospitalization. Please page with any questions or concerns.           Interval History:      Feels good  No dizziness, chest pain or fatigue          Data reviewed today: I reviewed all new labs and imaging reports over the last 24 hours. I personally reviewed no images or EKG's today.         Physical Exam:      Last Vital Signs:  /66  Pulse 98  Temp 97  F (36.1  C)  Resp 16  Ht 1.575 m (5' 2\")  Wt 82.6 kg (182 lb)  LMP 04/25/2007  SpO2 100%  BMI 33.29 kg/m2  GENERAL: No apparent distress. Awake, alert, " and fully oriented.  HEENT: Normocephalic, atraumatic. Extraocular movements intact.  CARDIOVASCULAR: Regular rate and rhythm without murmurs or rubs. No S3.  PULMONARY: Clear bilaterally.  ABDOMINAL: Soft, non-tender, non-distended. Bowel sounds normoactive. No hepatosplenomegaly.  EXTREMITIES: No cyanosis or clubbing. No edema.  NEUROLOGICAL: CN 2-12 grossly intact, no focal neurological deficits.  DERMATOLOGICAL: No rash, ulcer, ecchymoses, jaundice.  PSYCH: appropriate           Medications:      All current medications were reviewed.         Data:      All new lab and imaging data was reviewed.   Data       Recent Labs  Lab 07/31/18  0646 07/30/18  1226   HGB 8.9*  --    PLT  --  242       Recent Labs  Lab 07/30/18  1226   CR 0.58   GFRESTIMATED >90   GFRESTBLACK >90       No results found for this or any previous visit (from the past 24 hour(s)).    Elham Domingo MD  Hospitalist  [facility] [address] [place] [address]

## 2018-07-31 NOTE — PROGRESS NOTES
"Orthopedic Surgery  7/31/2018  POD#: 1    S: Patient voices no complaints today other than some thigh pain.  Denies dizziness, SOB, nausea.    O: Blood pressure 122/68, pulse 98, temperature 97.1  F (36.2  C), temperature source Oral, resp. rate 18, height 1.575 m (5' 2\"), weight 82.6 kg (182 lb), last menstrual period 04/25/2007, SpO2 97 %, not currently breastfeeding.  Lab Results   Component Value Date    HGB 8.9 07/31/2018     No results found for: INR     I/O last 3 completed shifts:  In: 3136 [P.O.:810; I.V.:2326]  Out: 3150 [Urine:3050; Blood:100]    Neurovascularly intact.  Calves are negative bilaterally, both soft and nontender.  The wound is C/D/I.  The wound looks good with minimal erythema of the surrounding skin.    A: Ms. Miranda is doing well status post Procedure(s):  L MEKA    P:   1. Mobilize and continue physical therapy. No hip precautions.  2. Anticoagulation - will dc on ASA 325mg BID  3. Pain management - controlled well with oxycodone and hydroxyzine  4. Anticipate discharge to home on Thurs.      Darlyn Justin PA-C  O: [phone]  "

## 2018-07-31 NOTE — PLAN OF CARE
Problem: Patient Care Overview  Goal: Plan of Care/Patient Progress Review  OT- Eval completed, tx initiated. Pt seen POD #1 s/p L MEKA. She lives in a mulit-level home with her spouse. She had a previous R MEKA in April 2018 and owns a 2ww, sock-aid, long handled sponge, grab bars. She has comfort height toilets and a walk-in shower. She was IND in most ADLs PTA (spouse helped with socks) and IADLs. Pt's spouse took Friday off of work to assist as needed upon return home.     Discharge Planner OT   Patient plan for discharge: home Thurs  Current status: Stating tolerable pain. Sit>stand, ambulated to bathroom SBA with 2ww. Pt completes 3 g/h tasks sinkside SBA with good balance noted, VCs for safe 2ww use. Discussed home toilet set-up, following instruction pt completes SBA with 2ww and grab bar (similar to home set-up). Discussed home shower set-up and educated in safe shower transfer; following instruction pt completes SBA with VCs and use of 2ww and GB. Ambulated back to chair SBA with 2ww. Discussed AE recomendation of flexible sock-aid.   Barriers to return to prior living situation: stairs to bedroom-defer to PT  Recommendations for discharge: home with spouse A for IADLs  Rationale for recommendations: Pt presents with below baseline ability to complete ADL and functional mobility tasks; will benefit from continued IP OT services to maximize safety and independence in daily activities       Entered by: Geno Noriega 07/31/2018 12:35 PM

## 2018-07-31 NOTE — PROGRESS NOTES
"Patient requested Healing Touch for pain and anxiety because it was helpful with her last surgery. Healing Touch was explained and patient consented to Healing Touch session. Healing Touch performed with music, pain prior to session 5/10 anxiety 3-4/10. Patient denies pain or anxiety post session. \"Thank you that was so relaxing!\" Time spent with patient 60 minutes.    Ryder CABA RN-BC HNB-BC  "

## 2018-07-31 NOTE — PLAN OF CARE
Problem: Hip Arthroplasty (Total, Partial) (Adult)  Goal: Signs and Symptoms of Listed Potential Problems Will be Absent, Minimized or Managed (Hip Arthroplasty)  Signs and symptoms of listed potential problems will be absent, minimized or managed by discharge/transition of care (reference Hip Arthroplasty (Total, Partial) (Adult) CPG).   A&Ox4. VSS. A1 with gb and walker. Tolerating diet well. Huerta in place. Not passing gas yet, BS hypoactive. Dressing has scant dried drainage. Taking PRN Oxy, atarax and amatriptyline for pain which are effective. Healing touch effective. CPAP on for sleep. Plans to dc home in a few days.

## 2018-07-31 NOTE — PLAN OF CARE
Problem: Hip Arthroplasty (Total, Partial) (Adult)  Goal: Signs and Symptoms of Listed Potential Problems Will be Absent, Minimized or Managed (Hip Arthroplasty)  Signs and symptoms of listed potential problems will be absent, minimized or managed by discharge/transition of care (reference Hip Arthroplasty (Total, Partial) (Adult) CPG).   Outcome: Improving  Afeb, slightly tachy, BPs wnl, cms intact, aquacel with scant drainage. Huerta removed and pt voided. Moderate pain controlled with po analgesics. Up with assist of 1 and walker. Plans on home on day 2 or 3 upon discharge.

## 2018-08-01 ENCOUNTER — APPOINTMENT (OUTPATIENT)
Dept: OCCUPATIONAL THERAPY | Facility: CLINIC | Age: 58
DRG: 470 | End: 2018-08-01
Attending: ORTHOPAEDIC SURGERY
Payer: COMMERCIAL

## 2018-08-01 ENCOUNTER — APPOINTMENT (OUTPATIENT)
Dept: PHYSICAL THERAPY | Facility: CLINIC | Age: 58
DRG: 470 | End: 2018-08-01
Attending: ORTHOPAEDIC SURGERY
Payer: COMMERCIAL

## 2018-08-01 VITALS
OXYGEN SATURATION: 95 % | HEART RATE: 107 BPM | BODY MASS INDEX: 33.49 KG/M2 | WEIGHT: 182 LBS | TEMPERATURE: 97.4 F | HEIGHT: 62 IN | SYSTOLIC BLOOD PRESSURE: 93 MMHG | RESPIRATION RATE: 16 BRPM | DIASTOLIC BLOOD PRESSURE: 52 MMHG

## 2018-08-01 LAB
ANION GAP SERPL CALCULATED.3IONS-SCNC: 4 MMOL/L (ref 3–14)
BUN SERPL-MCNC: 8 MG/DL (ref 7–30)
CALCIUM SERPL-MCNC: 8.4 MG/DL (ref 8.5–10.1)
CHLORIDE SERPL-SCNC: 106 MMOL/L (ref 94–109)
CO2 SERPL-SCNC: 29 MMOL/L (ref 20–32)
CREAT SERPL-MCNC: 0.57 MG/DL (ref 0.52–1.04)
ERYTHROCYTE [DISTWIDTH] IN BLOOD BY AUTOMATED COUNT: 13.7 % (ref 10–15)
GFR SERPL CREATININE-BSD FRML MDRD: >90 ML/MIN/1.7M2
GLUCOSE SERPL-MCNC: 124 MG/DL (ref 70–99)
HCT VFR BLD AUTO: 26.2 % (ref 35–47)
HGB BLD-MCNC: 8.2 G/DL (ref 11.7–15.7)
MCH RBC QN AUTO: 26.6 PG (ref 26.5–33)
MCHC RBC AUTO-ENTMCNC: 31.3 G/DL (ref 31.5–36.5)
MCV RBC AUTO: 85 FL (ref 78–100)
PLATELET # BLD AUTO: 186 10E9/L (ref 150–450)
POTASSIUM SERPL-SCNC: 4.2 MMOL/L (ref 3.4–5.3)
RBC # BLD AUTO: 3.08 10E12/L (ref 3.8–5.2)
SODIUM SERPL-SCNC: 139 MMOL/L (ref 133–144)
WBC # BLD AUTO: 6.3 10E9/L (ref 4–11)

## 2018-08-01 PROCEDURE — 40000133 ZZH STATISTIC OT WARD VISIT

## 2018-08-01 PROCEDURE — 82947 ASSAY GLUCOSE BLOOD QUANT: CPT | Performed by: HOSPITALIST

## 2018-08-01 PROCEDURE — 97530 THERAPEUTIC ACTIVITIES: CPT | Mod: GP | Performed by: PHYSICAL THERAPY ASSISTANT

## 2018-08-01 PROCEDURE — 97116 GAIT TRAINING THERAPY: CPT | Mod: GP | Performed by: PHYSICAL THERAPY ASSISTANT

## 2018-08-01 PROCEDURE — 97535 SELF CARE MNGMENT TRAINING: CPT | Mod: GO

## 2018-08-01 PROCEDURE — 25000132 ZZH RX MED GY IP 250 OP 250 PS 637: Performed by: HOSPITALIST

## 2018-08-01 PROCEDURE — 80048 BASIC METABOLIC PNL TOTAL CA: CPT | Performed by: HOSPITALIST

## 2018-08-01 PROCEDURE — 25000132 ZZH RX MED GY IP 250 OP 250 PS 637: Performed by: ORTHOPAEDIC SURGERY

## 2018-08-01 PROCEDURE — 25000128 H RX IP 250 OP 636: Performed by: ORTHOPAEDIC SURGERY

## 2018-08-01 PROCEDURE — 99232 SBSQ HOSP IP/OBS MODERATE 35: CPT | Performed by: HOSPITALIST

## 2018-08-01 PROCEDURE — 36415 COLL VENOUS BLD VENIPUNCTURE: CPT | Performed by: HOSPITALIST

## 2018-08-01 PROCEDURE — 40000193 ZZH STATISTIC PT WARD VISIT: Performed by: PHYSICAL THERAPY ASSISTANT

## 2018-08-01 PROCEDURE — 85027 COMPLETE CBC AUTOMATED: CPT | Performed by: HOSPITALIST

## 2018-08-01 RX ORDER — FERROUS SULFATE 325(65) MG
325 TABLET ORAL 2 TIMES DAILY WITH MEALS
Qty: 100 TABLET | Refills: 0 | Status: SHIPPED | OUTPATIENT
Start: 2018-08-01 | End: 2020-10-28

## 2018-08-01 RX ADMIN — MAGNESIUM OXIDE TAB 400 MG (241.3 MG ELEMENTAL MG) 400 MG: 400 (241.3 MG) TAB at 08:37

## 2018-08-01 RX ADMIN — ENOXAPARIN SODIUM 40 MG: 40 INJECTION SUBCUTANEOUS at 05:33

## 2018-08-01 RX ADMIN — TOLTERODINE 4 MG: 4 CAPSULE, EXTENDED RELEASE ORAL at 08:37

## 2018-08-01 RX ADMIN — ACETAMINOPHEN 975 MG: 325 TABLET, FILM COATED ORAL at 12:16

## 2018-08-01 RX ADMIN — HYDROXYZINE HYDROCHLORIDE 25 MG: 25 TABLET ORAL at 09:59

## 2018-08-01 RX ADMIN — OXYCODONE HYDROCHLORIDE 10 MG: 5 TABLET ORAL at 01:51

## 2018-08-01 RX ADMIN — CELECOXIB 200 MG: 200 CAPSULE ORAL at 08:40

## 2018-08-01 RX ADMIN — OXYCODONE HYDROCHLORIDE 5 MG: 5 TABLET ORAL at 09:59

## 2018-08-01 RX ADMIN — ATORVASTATIN CALCIUM 40 MG: 40 TABLET, FILM COATED ORAL at 08:37

## 2018-08-01 RX ADMIN — FERROUS SULFATE TAB 325 MG (65 MG ELEMENTAL FE) 325 MG: 325 (65 FE) TAB at 08:37

## 2018-08-01 RX ADMIN — OXYCODONE HYDROCHLORIDE 10 MG: 5 TABLET ORAL at 05:33

## 2018-08-01 RX ADMIN — SERTRALINE HYDROCHLORIDE 150 MG: 50 TABLET ORAL at 08:38

## 2018-08-01 RX ADMIN — RANITIDINE 150 MG: 150 TABLET ORAL at 08:40

## 2018-08-01 RX ADMIN — SENNOSIDES AND DOCUSATE SODIUM 2 TABLET: 8.6; 5 TABLET ORAL at 08:40

## 2018-08-01 RX ADMIN — ACETAMINOPHEN 975 MG: 325 TABLET, FILM COATED ORAL at 05:33

## 2018-08-01 ASSESSMENT — ACTIVITIES OF DAILY LIVING (ADL)
ADLS_ACUITY_SCORE: 12

## 2018-08-01 NOTE — PLAN OF CARE
Problem: Patient Care Overview  Goal: Plan of Care/Patient Progress Review  Discharge Planner PT   Patient plan for discharge: Home  Current status: Pt amb 250' with ww indep with = step length. Goal met Pt performed 8 steps with 1 rail independently. Goal partially met (pt reported only having 8 steps) Pt is indep with all transfers. Goal met  Barriers to return to prior living situation: none  Recommendations for discharge: Home with assist from  as needed per plan established by the PT.    Rationale for recommendations: Pt moving well. Anticipate continued improvement with mobility and ability to meet goals with continued IP PT.     PT - Pt discharging to home today with goals partially met. Please refer to discharge summary.        Entered by: Ileana Fletcher 08/01/2018 11:14 AM

## 2018-08-01 NOTE — PLAN OF CARE
Problem: Patient Care Overview  Goal: Plan of Care/Patient Progress Review  Outcome: Improving  Patient A & O. VSS  Afebrile. Voiding well. Saline locked. Up with assist of 1 gait belt and walker. CMS intact. Dressing c/d/i. Hgb 8.9 MD aware PO Iron started. Lovenox started earlier with plt 242. Tolerated reg diet. Pain managed with Oxycodone and atarax. Plans to DC home tomorrow. DC medications in lock box.

## 2018-08-01 NOTE — PLAN OF CARE
Problem: Patient Care Overview  Goal: Plan of Care/Patient Progress Review    Discharge Planner OT   Patient plan for discharge: home Thurs  Current status: Educated in use of AE during dressing task-flexible sock-aid, reacher, and long handled shoe horn. Following instruction, pt completes LB dressing including socks/shoes Mod Ind using AE. IND in UB dressing. Educated in EC/WS techniques for dressing tasks. Pt with no further questions at end of session.   Barriers to return to prior living situation: stairs to bedroom-defer to PT  Recommendations for discharge: home with spouse A for IADLs  Rationale for recommendations: Pt demonstrates ability to complete ADL and functional mobility tasks appropriate for safe dc home with spouse A.       Entered by: Geno Noriega 08/01/2018 12:14 PM     Occupational Therapy Discharge Summary    Reason for therapy discharge:    All goals and outcomes met, no further needs identified.    Progress towards therapy goal(s). See goals on Care Plan in [facility] electronic health record for goal details.  Goals met    Therapy recommendation(s):    No further therapy is recommended.

## 2018-08-01 NOTE — PLAN OF CARE
Problem: Patient Care Overview  Goal: Plan of Care/Patient Progress Review  Outcome: Improving  Vss except for intermittent tachy hr. Afebrile. Lungs clr-room air via cpap-mid to high 90s. Is done. +bs/+gas, no nausea. Tolerating diet. Last bm 07/29/18. Voiding. Saline locked. Drsg-dried drainage. Cms wnl except for 2+ edema. Skin has few scabs. Pain managed with Oxycodone/Tylenol. Tolerating ice. Repositioning self. Pt planning on discharge to home today. No other significant issues noted overnight.

## 2018-08-01 NOTE — PROGRESS NOTES
[facility]  Hospitalist Progress Note  Patient Name: Delilah Miranda    MRN: [id number]  Provider:  Elham Domingo MD  Date:08/01/2018      Requesting Physician:  Dr Zaragoza  Reason for consult:  Post-operative medical management    Delilah Miranda is a 58 year old female with a history of SHIRA on CPAP, anxiety, mild intermittent asthma  who was admitted for elective MEKA for primary left hip OA.       Assessment and Plan:      DJD left hip s/p MEKA on 7/31/2018: The patient is doing well, currently has well controlled pain and is hemodynamically stable. Will defer diet, activity, DVT prophylaxis, and pain control to the primary team. Currently the patient is on lovenox. Continue physical and occupational therapy. Continue incentive spirometry and check hemoglobin to evaluate for surgical blood loss and potential need for transfusion.     ABLA: Hb dropped from 124.4->8.2. Pt has no symptoms at all. Last surgery in April (R MEKA) had similar drop in Hb and did not require blood transfusion.  Her blood pressure is slightly soft today in 90s over 60s and she is mildly tachycardic likely due to blood loss however she is absolutely asymptomatic so transfusion is not indicated.  She will hopefully recover with iron supplementation  --cont to monitor for symptoms   --Prescribed ferrous sulphate for home    Hx of dyslipidemia- resumed home statins  Hx of mild intermittent asthma- not in exacerbation. Resume as need inhalers  SHIRA and home appliance resumed.  Hx of anxiety- on sertraline.        Code status: full  Prophylaxis: on lovenox  Disposition: defer to ortho service     Thank you for the consultation, we will continue to follow along during the hospitalization. Please page with any questions or concerns.           Interval History:      Feels good participated with therapy without any issues, pain is under control  She denies specifically any dizziness, chest tightness, excessive fatigue or palpitations  "discussed  Resuming oral iron supplement for anemia-and she is agreeable to that       Data reviewed today: I reviewed all new labs and imaging reports over the last 24 hours. I personally reviewed no images or EKG's today.         Physical Exam:      Last Vital Signs:  BP 93/52  Pulse 107  Temp 97.4  F (36.3  C)  Resp 16  Ht 1.575 m (5' 2\")  Wt 82.6 kg (182 lb)  LMP 04/25/2007  SpO2 95%  BMI 33.29 kg/m2  GENERAL: No apparent distress. Awake, alert, and fully oriented.  HEENT: Normocephalic, atraumatic. Extraocular movements intact.  CARDIOVASCULAR: Regular rate and rhythm without murmurs or rubs. No S3.  PULMONARY: Clear bilaterally.  ABDOMINAL: Soft, non-tender, non-distended. Bowel sounds normoactive. No hepatosplenomegaly.  EXTREMITIES: No cyanosis or clubbing. No edema.  NEUROLOGICAL: CN 2-12 grossly intact, no focal neurological deficits.  DERMATOLOGICAL: No rash, ulcer, ecchymoses, jaundice.  PSYCH: appropriate           Medications:      All current medications were reviewed.         Data:      All new lab and imaging data was reviewed.   Data       Recent Labs  Lab 08/01/18  0636 07/31/18  0646 07/30/18  1226   WBC 6.3  --   --    HGB 8.2* 8.9*  --    HCT 26.2*  --   --    MCV 85  --   --      --  242       Recent Labs  Lab 08/01/18  0636 07/30/18  1226     --    POTASSIUM 4.2  --    CHLORIDE 106  --    CO2 29  --    ANIONGAP 4  --    *  --    BUN 8  --    CR 0.57 0.58   GFRESTIMATED >90 >90   GFRESTBLACK >90 >90   LEONORA 8.4*  --        No results found for this or any previous visit (from the past 24 hour(s)).    Elham Domingo MD  Hospitalist  [facility] [address] [place] [address]  "

## 2018-08-01 NOTE — DISCHARGE INSTRUCTIONS
Return to clinic in10-14 days call if no appt made yet    1. Do exercises as instructed by therapist.  2. No hip precautions.per surgeons approach  3. Walk daily increasing distance and time each day by a little.  4. Ice hip for swelling and discomfort.  5. May shower dressing is waterproof and stays in place until follow up Dr visit .  6. Notify your dentist of your implant so you can get antibiotics before any dental work or for any other invasive procedure.      Call your physician if:   1. Fever greater than 101 degrees with body chills or excessive sweating.  2. Increased redness, localized warmth, tenderness, drainage or swelling at dressing site.     3. Pain not controlled with oral pain medications, ice and rest.   4. No bowel movement in 3 days (may use Milk of Magnesia or other over the counter remedy).  5. Chest pain, shortness of breath, and/or calf pain with excessive swelling.  6. Generalized feeling of illness such as nausea/vomiting and/or lightheaded/dizziness .  7. Any other questions or concerns related to your surgery/recovery.      Thank you for allowing [facility] [place] to participate in your cares!!

## 2018-08-02 ENCOUNTER — TELEPHONE (OUTPATIENT)
Dept: FAMILY MEDICINE | Facility: CLINIC | Age: 58
End: 2018-08-02

## 2018-08-02 NOTE — DISCHARGE SUMMARY
"Discharge Summary    Delilah Miranda MRN# [id number]   YOB: 1960 Age: 58 year old     Date of Admission: 7/30/2018    Date of Discharge: 8/1/2018    Reason for Admission: Delilah Miranda is an 58 year old female who was admitted to the hospital following surgery with Dr. Bryon Zaragoza.    Preoperative Diagnosis: DJD    Postoperative Diagnosis: DJD    Procedure Completed: left total hip arthroplasty     Hospital Course:  Ms. Miranda was admitted and underwent the above procedure. The patient tolerated the procedure well. There were no complications. Following surgery she was admitted to the floor.  During her stay she did not require any blood transfusions.  Her last hemoglobin was noted to be   Lab Results   Component Value Date    HGB 8.2 08/01/2018   .  Her pain was controlled with oral pain medication.  During her stay she progressed well in physical therapy and all the therapy goals were met.     Discharge Physical Exam:  BP 93/52  Pulse 107  Temp 97.4  F (36.3  C)  Resp 16  Ht 1.575 m (5' 2\")  Wt 82.6 kg (182 lb)  LMP 04/25/2007  SpO2 95%  BMI 33.29 kg/m2  Neurovascularly intact, distal pulses present bilaterally.  Calves are negative bilaterally, both soft and nontender.  The wound looks good with minimal erythema of the surrounding skin.    Assessment: Ms. Miranda is stable and doing well status post left total hip arthroplasty on POD #2.    Plan: We will discharge her home on analgesics and deep venous thrombosis prophylaxis.  She will follow-up with Darlyn Justin PA-C or Dr. Bryon Zaragoza approximately 2 weeks from surgery.  She may call [phone] to schedule an appointment.    Discharge Instructions:  Discharge diet: Regular   Discharge activity: Weight bear as tolerated.  Advance from the walker to a cane as tolerated.  Discontinue the use of cane when comfortable.   Physical therapy: Work on therapy exercises given in the hospital.    Discharge follow-up: Follow up with Darlyn" ANDREW Justin in 10-14 days.   Anticoagulation Asprin 325 mg twice daily for 5 weeks.   Wound care: Leave aquacel dressing in place until post-op follow-up.  If it becomes loose or soiled then remove and replace with daily dry dressings.  Keep wound clean and dry.  May get incision area wet in shower but do not soak or scrub.         Meds:   Delilah Miranda   Home Medication Instructions JARROD:[id number]    Printed on:08/02/18 1105   Medication Information                      albuterol (ALBUTEROL) 108 (90 BASE) MCG/ACT inhaler  Inhale 2 puffs into the lungs every 6 hours as needed INHALE 1 TO 2 PUFFS EVERY 4 TO 6 HOURS AS NEEDED             alpha-lipoic acid 600 MG CAPS  Take 1 capsule by mouth daily             amitriptyline (ELAVIL) 25 MG tablet  TAKE ONE TABLET BY MOUTH AT BEDTIME             aspirin 325 MG EC tablet  Take one Aspirin tab twice daily for 5 weeks.             atorvastatin (LIPITOR) 40 MG tablet  Take 1 tablet (40 mg) by mouth daily             Biotin 51268 MCG TABS  Take 1 tablet by mouth daily             ferrous sulfate (IRON) 325 (65 Fe) MG tablet  Take 1 tablet (325 mg) by mouth 2 times daily (with meals)             fluticasone (FLONASE) 50 MCG/ACT spray  Spray 1 spray into both nostrils daily as needed for rhinitis or allergies             hydrOXYzine (ATARAX) 25 MG tablet  Take 1-2 tablets (25-50 mg) by mouth every 6 hours as needed for anxiety             ibuprofen (ADVIL/MOTRIN) 800 MG tablet  800 mg every 6 hours as needed (Will stop 7 days pre op) prn             Magnesium (CVS TRIPLE MAGNESIUM COMPLEX) 400 MG CAPS  Take 1 capsule by mouth daily             methocarbamol (ROBAXIN) 500 MG tablet  Take 2 tablets (1,000 mg) by mouth 3 times daily as needed for muscle spasms             order for DME  Equipment being ordered: Walker Wheels () and Walker ()  Treatment Diagnosis: Impaired gait s/p MEKA.             oxyCODONE-acetaminophen (PERCOCET) 5-325 MG per tablet  Take 1-2  tablets by mouth every 4 hours as needed for severe pain             senna-docusate (SENOKOT-S;PERICOLACE) 8.6-50 MG per tablet  Take 1 tablet by mouth 2 times daily             sertraline (ZOLOFT) 100 MG tablet  TAKE ONE AND ONE-HALF TABLETS  BY MOUTH ONCE DAILY             VESICARE 5 MG tablet  TAKE ONE TABLET BY MOUTH EVERY DAY                     Darlyn Justin PA-C  O: [phone]

## 2018-08-02 NOTE — TELEPHONE ENCOUNTER
"[facility]    Hospital/TCU/ED for chronic condition Discharge Protocol    \"Hi, my name is Chandni Kendrick, a registered nurse, and I am calling from [facility].  I am calling to follow up and see how things are going for you after your recent emergency visit/hospital/TCU stay.\"    Tell me how you are doing now that you are home?\" feel pretty good, has 4 wheeled walker, able to climb stairs, feels safe, steady, no dizziness, doing home exercises, drinking plenty of water, taking stool softener  Had rt hip done in April      Discharge Instructions    \"Let's review your discharge instructions.  What is/are the follow-up recommendations?  Pt. Response: home exercises, see surgeon in 2 wks, PT, hydrated, walk    \"Has an appointment with your primary care provider been scheduled?\"   No (schedule appointment)    \"When you see the provider, I would recommend that you bring your medications with you.\"    Medications    \"Tell me what changed about your medicines when you discharged?\"    Changes to chronic meds?    0-1   Added iron, stool softener, pain med, changed ASA 81 mg to  mg BID  \"What questions do you have about your medications?\"    None     New diagnoses of heart failure, COPD, diabetes, or MI?    No              Medication reconciliation completed? Yes  Was MTM referral placed (*Make sure to put transitions as reason for referral)?   No    Call Summary    \"What questions or concerns do you have about your recent visit and your follow-up care?\"     none    \"If you have questions or things don't continue to improve, we encourage you contact us through the main clinic number (give number).  Even if the clinic is not open, triage nurses are available 24/7 to help you.     We would like you to know that our clinic has extended hours (provide information).  We also have urgent care (provide details on closest location and hours/contact info)\"      \"Thank you for your time and take care!\"  Chandni Kendrick RN, " BS  Clinical Nurse Triage.

## 2018-08-09 ENCOUNTER — OFFICE VISIT (OUTPATIENT)
Dept: FAMILY MEDICINE | Facility: CLINIC | Age: 58
End: 2018-08-09
Payer: COMMERCIAL

## 2018-08-09 VITALS
DIASTOLIC BLOOD PRESSURE: 72 MMHG | HEART RATE: 116 BPM | TEMPERATURE: 99 F | WEIGHT: 181 LBS | RESPIRATION RATE: 16 BRPM | SYSTOLIC BLOOD PRESSURE: 120 MMHG | OXYGEN SATURATION: 97 % | BODY MASS INDEX: 33.11 KG/M2

## 2018-08-09 DIAGNOSIS — D64.9 LOW HEMOGLOBIN: ICD-10-CM

## 2018-08-09 DIAGNOSIS — Z96.642 S/P HIP REPLACEMENT, LEFT: Primary | ICD-10-CM

## 2018-08-09 DIAGNOSIS — J30.9 CHRONIC ALLERGIC RHINITIS, UNSPECIFIED SEASONALITY, UNSPECIFIED TRIGGER: ICD-10-CM

## 2018-08-09 LAB
ERYTHROCYTE [DISTWIDTH] IN BLOOD BY AUTOMATED COUNT: 14.7 % (ref 10–15)
HCT VFR BLD AUTO: 32 % (ref 35–47)
HGB BLD-MCNC: 10.1 G/DL (ref 11.7–15.7)
MCH RBC QN AUTO: 26.8 PG (ref 26.5–33)
MCHC RBC AUTO-ENTMCNC: 31.6 G/DL (ref 31.5–36.5)
MCV RBC AUTO: 85 FL (ref 78–100)
PLATELET # BLD AUTO: 527 10E9/L (ref 150–450)
RBC # BLD AUTO: 3.77 10E12/L (ref 3.8–5.2)
WBC # BLD AUTO: 11.7 10E9/L (ref 4–11)

## 2018-08-09 PROCEDURE — 85027 COMPLETE CBC AUTOMATED: CPT | Performed by: PHYSICIAN ASSISTANT

## 2018-08-09 PROCEDURE — 36415 COLL VENOUS BLD VENIPUNCTURE: CPT | Performed by: PHYSICIAN ASSISTANT

## 2018-08-09 PROCEDURE — 99495 TRANSJ CARE MGMT MOD F2F 14D: CPT | Performed by: PHYSICIAN ASSISTANT

## 2018-08-09 RX ORDER — FLUTICASONE PROPIONATE 50 MCG
1 SPRAY, SUSPENSION (ML) NASAL DAILY PRN
Qty: 3 BOTTLE | Refills: 3 | Status: SHIPPED | OUTPATIENT
Start: 2018-08-09 | End: 2019-08-13

## 2018-08-09 NOTE — MR AVS SNAPSHOT
After Visit Summary   8/9/2018    Delilah Miranda    MRN: [id number]           Patient Information     Date Of Birth          1960        Visit Information        Provider Department      8/9/2018 3:30 PM Roxana Cuadra PA-C [facility]         Follow-ups after your visit        Who to contact     If you have questions or need follow up information about today's clinic visit or your schedule please contact [facility] directly at [phone].  Normal or non-critical lab and imaging results will be communicated to you by MyChart, letter or phone within 4 business days after the clinic has received the results. If you do not hear from us within 7 days, please contact the clinic through Overbloghart or phone. If you have a critical or abnormal lab result, we will notify you by phone as soon as possible.  Submit refill requests through GOSO or call your pharmacy and they will forward the refill request to us. Please allow 3 business days for your refill to be completed.          Additional Information About Your Visit        MyChart Information     GOSO gives you secure access to your electronic health record. If you see a primary care provider, you can also send messages to your care team and make appointments. If you have questions, please call your primary care clinic.  If you do not have a primary care provider, please call [phone] and they will assist you.        Care EveryWhere ID     This is your Care EveryWhere ID. This could be used by other organizations to access your [facility] medical records  [id number]        Your Vitals Were     Pulse Temperature Respirations Last Period Pulse Oximetry BMI (Body Mass Index)    116 99  F (37.2  C) (Oral) 16 04/25/2007 97% 33.11 kg/m2       Blood Pressure from Last 3 Encounters:   08/09/18 120/72   08/01/18 93/52   07/06/18 119/75    Weight from Last 3 Encounters:   08/09/18 181 lb (82.1 kg)   07/30/18 182  lb (82.6 kg)   07/06/18 183 lb (83 kg)              Today, you had the following     No orders found for display         Today's Medication Changes          These changes are accurate as of 8/9/18  4:02 PM.  If you have any questions, ask your nurse or doctor.               These medicines have changed or have updated prescriptions.        Dose/Directions    amitriptyline 25 MG tablet   Commonly known as:  ELAVIL   This may have changed:    - when to take this  - reasons to take this  - additional instructions   Used for:  Psychophysiological insomnia        TAKE ONE TABLET BY MOUTH AT BEDTIME   Quantity:  90 tablet   Refills:  1                Primary Care Provider Office Phone # Fax #    Roxana Cuadra PA-C [phone]       [address] [facility] [address]        Equal Access to Services     JACKIE HANSEN : Kenzie Lowe, waaxda luqadaha, qaybta kaalmada adedarrenyamartita, mikala weber . So [facility] [phone].    ATENCIÓN: Si habla español, tiene a rodriguez disposición servicios gratuitos de asistencia lingüística. [facility] [phone].    We comply with applicable federal civil rights laws and [place] laws. We do not discriminate on the basis of race, color, national origin, age, disability, sex, sexual orientation, or gender identity.            Thank you!     Thank you for choosing [facility]  for your care. Our goal is always to provide you with excellent care. Hearing back from our patients is one way we can continue to improve our services. Please take a few minutes to complete the written survey that you may receive in the mail after your visit with us. Thank you!             Your Updated Medication List - Protect others around you: Learn how to safely use, store and throw away your medicines at www.disposemymeds.org.          This list is accurate as of 8/9/18  4:02 PM.  Always use your most recent med list.                   Brand  Name Dispense Instructions for use Diagnosis    albuterol 108 (90 Base) MCG/ACT Inhaler    PROAIR HFA    2 Inhaler    Inhale 2 puffs into the lungs every 6 hours as needed INHALE 1 TO 2 PUFFS EVERY 4 TO 6 HOURS AS NEEDED    Mild intermittent asthma       alpha-lipoic acid 600 MG Caps      Take 1 capsule by mouth daily        amitriptyline 25 MG tablet    ELAVIL    90 tablet    TAKE ONE TABLET BY MOUTH AT BEDTIME    Psychophysiological insomnia       aspirin 325 MG EC tablet     70 tablet    Take one Aspirin tab twice daily for 5 weeks.    Status post total replacement of left hip       atorvastatin 40 MG tablet    LIPITOR    90 tablet    Take 1 tablet (40 mg) by mouth daily    Hyperlipidemia LDL goal <130       Biotin 36060 MCG Tabs      Take 1 tablet by mouth daily        CVS TRIPLE MAGNESIUM COMPLEX 400 MG Caps   Generic drug:  Magnesium      Take 1 capsule by mouth daily        ferrous sulfate 325 (65 Fe) MG tablet    IRON    100 tablet    Take 1 tablet (325 mg) by mouth 2 times daily (with meals)    Anemia due to blood loss, acute       fluticasone 50 MCG/ACT spray    FLONASE     Spray 1 spray into both nostrils daily as needed for rhinitis or allergies        hydrOXYzine 25 MG tablet    ATARAX    60 tablet    Take 1-2 tablets (25-50 mg) by mouth every 6 hours as needed for anxiety    Situational anxiety       ibuprofen 800 MG tablet    ADVIL/MOTRIN     800 mg every 6 hours as needed (Will stop 7 days pre op) prn        methocarbamol 500 MG tablet    ROBAXIN    90 tablet    Take 2 tablets (1,000 mg) by mouth 3 times daily as needed for muscle spasms    Muscle spasm       order for DME     1 each    Equipment being ordered: Walker Wheels () and Walker () Treatment Diagnosis: Impaired gait s/p MEKA.    Status post total replacement of right hip       oxyCODONE-acetaminophen 5-325 MG per tablet    PERCOCET    60 tablet    Take 1-2 tablets by mouth every 4 hours as needed for severe pain    Status post  total replacement of left hip       senna-docusate 8.6-50 MG per tablet    SENOKOT-S;PERICOLACE    30 tablet    Take 1 tablet by mouth 2 times daily    Status post total replacement of left hip       sertraline 100 MG tablet    ZOLOFT    135 tablet    TAKE ONE AND ONE-HALF TABLETS  BY MOUTH ONCE DAILY    Anxiety       VESICARE 5 MG tablet   Generic drug:  solifenacin     90 tablet    TAKE ONE TABLET BY MOUTH EVERY DAY    Overactive bladder

## 2018-08-09 NOTE — PROGRESS NOTES
"8/9/2018      Kenneth G. Pallas, MD  [facility] [address]  [facility] [address]      RE: Yenni Ramires       Dear Colleague,    I had the pleasure of seeing Yenni Ramires in the [facility].    Service Date: 08/09/2018      INDICATION FOR CARDIAC CONSULTATION:  Tachycardia.      HISTORY OF PRESENT ILLNESS:  It was my pleasure to see your patient, Yenni Ramires, who is a very pleasant 49-year-old patient who has noticed over the last 2 weeks that she is getting headaches which extend from the back of her neck up to the top of her head and also she is noticing episodes of tachycardia on her Apple Watch when she has the headaches with heart rates going up to 110-115.  She sometimes notices that her heart is also beating strongly with the tachycardia.  She also has the sensation of \"foggy thinking.\"  This can be associated with the headache, but sometimes it is not associated with a headache and not necessarily associated with the tachycardia either.  She has not been complaining of irregular heartbeat.  She has had no symptoms of syncope or near-syncope.  However, if she does get up quickly, she feels somewhat dizzy.  She has noticed that her blood pressure is at the upper limits of normal at 138/82.        The patient only drinks decaffeinated coffee and very occasionally will drink a caffeinated pop.  She does not use Sudafed.  She occasionally uses Tylenol, so there is not a history of stimulant medications or compounds in this patient's case.      She did have a calcium score performed in 03/2017 which was normal.        She has noticed some discomfort in the upper chest.  This occurs very occasionally when she is under a lot of stress and lasts maybe for a few seconds.  This occurs very infrequently, maybe monthly.        Her 12-lead electrocardiogram was entirely normal.        She gets no exertional chest discomfort.  She has a possible diagnosis of " "Orthopedic Surgery  8/1/2018    S: Patient voices no complaints today.     O: Blood pressure 119/58, pulse 107, temperature 96.7  F (35.9  C), temperature source Oral, resp. rate 16, height 1.575 m (5' 2\"), weight 82.6 kg (182 lb), last menstrual period 04/25/2007, SpO2 95 %, not currently breastfeeding.  Lab Results   Component Value Date    HGB 8.2 08/01/2018     No results found for: INR     Neurovascularly intact.  Calves are negative bilaterally, both soft and nontender.  The wound is C/D/I.  The wound looks good with minimal erythema of the surrounding skin.    A: Ms. Miranda is doing well status post Procedure(s):  ARTHROPLASTY HIP.    P: Continue physical therapy.   Anticoagulation, home on asa  Pain management  Discharge planning, likely home today    Bryon Zaragoza  [phone]  " asthma, but her pulmonary function tests were entirely normal and methacholine challenge was also normal.  If she smells something noxious, she starts to vomit and her breathing worsens.  She has been followed by Pulmonology with respect to this.      IMPRESSION:     1.  Tachycardia.  We need to determine whether this is a true arrhythmia or sinus tachycardia in the setting of headache.     2.  Chest discomfort.  This sounds somewhat atypical and it is reassuring that her calcium score last year was 0.  However, she does have some risk factors for coronary artery disease.  Her mother had a myocardial infarct.  The patient is overweight with a BMI of 36.8 and she appears to be probably borderline hypertensive.   3.  Obesity.   4.  Borderline hypertension.      PLAN:  We will obtain an event monitor to determine the nature of her palpitations.  We will obtain an echocardiogram to determine if there is any evidence of structural heart disease which might be underlying the tachycardia and we will also obtain a stress echocardiogram to ensure that ischemia is not present.  Finally, we will obtain a basic metabolic profile, TSH and basic metabolic profile and magnesium today.        We will have the patient follow up with the results of these tests in about 4 week's time at [place].  I look forward to seeing her again.  She has been told to contact us if she has any questions or any concerns.        cc:      Kenneth Pallas, MD    [facility]   [address] [place] [address]         ANALIA DUNHAM MD, [facility]             D: 2018   T: 2018   MT: LILLIE      Name:     CHRISTOPHER HERNANDEZ   MRN:      [id number]        Account:      [id number]   :      1968           Service Date: 2018      Document: [id number]         Outpatient Encounter Prescriptions as of 2018   Medication Sig Dispense Refill     ALBUTEROL IN Inhale  into the lungs. prn       Cholecalciferol  (VITAMIN D PO) Take  by mouth. occ and occ vitamin C       Multiple Vitamin (DAILY MULTIVITAMIN PO) Take  by mouth.       No facility-administered encounter medications on file as of 8/9/2018.        Again, thank you for allowing me to participate in the care of your patient.      Sincerely,    Watson [place] MD, MD     [facility]

## 2018-08-09 NOTE — PROGRESS NOTES
SUBJECTIVE:   Delilah Miranda is a 58 year old female who presents to clinic today for the following health issues:          Hospital Follow-up Visit:    Hospital/Nursing Home/IP Rehab Facility: [facility]  Date of Admission: 7/30/18  Date of Discharge: 8/1/18  Reason(s) for Admission: Left Total Hip Arthroplasty             Problems taking medications regularly:  None       Medication changes since discharge: None       Problems adhering to non-medication therapy:  None    Summary of hospitalization:  [facility] discharge summary reviewed  Diagnostic Tests/Treatments reviewed.  Follow up needed: none  Other Healthcare Providers Involved in Patient s Care:         ORTHO  Update since discharge: improved.     Post Discharge Medication Reconciliation: discharge medications reconciled, continue medications without change.  Plan of care communicated with patient and family     Coding guidelines for this visit:  Type of Medical   Decision Making Face-to-Face Visit       within 7 Days of discharge Face-to-Face Visit        within 14 days of discharge   Moderate Complexity 96543 [id number]   High Complexity 55447 26409                  Problem list and histories reviewed & adjusted, as indicated.  Additional history: as documented    Patient Active Problem List   Diagnosis     Allergic rhinitis     Thoracic or lumbosacral neuritis or radiculitis, unspecified     Mild intermittent asthma     Mild major depression (H)     OA (osteoarthritis) of knee     Hyperlipidemia LDL goal <160     Elevated fasting glucose     Vitamin D deficiency     Heartburn     Snoring     Urinary incontinence, nocturnal enuresis     Anxiety     Overactive bladder     Situational anxiety     S/P total hip arthroplasty     S/P hip replacement, right     SHIRA (obstructive sleep apnea)     S/P hip replacement, left     Past Surgical History:   Procedure Laterality Date     ARTHROPLASTY HIP Right 4/9/2018    Procedure: ARTHROPLASTY HIP;   Right total hip arthroplasty using a Biomet G7 acetabulum and Taperloc femoral stem. ;  Surgeon: Bryon Zaragoza MD;  Location: [facility] OR     ARTHROPLASTY HIP Left 2018    Procedure: ARTHROPLASTY HIP;  Left total hip arthroplasty using a Biomet G7 acetabulum and Taperloc femoral stem. ;  Surgeon: Bryon Zaragoza MD;  Location: [facility] OR     ARTHROSCOPY KNEE BILATERAL       C NONSPECIFIC PROCEDURE      meniscus      SECTION       excsion of lypoma      times 3     GENITOURINARY SURGERY      bladder sling       Social History   Substance Use Topics     Smoking status: Former Smoker     Packs/day: 0.50     Years: 5.00     Types: Cigarettes     Quit date: 3/29/1993     Smokeless tobacco: Never Used      Comment: smoked 1 pk qd in her twenties quit at age 26     Alcohol use 0.0 oz/week     0 Standard drinks or equivalent per week      Comment: 3 beers monthly     Family History   Problem Relation Age of Onset     Cancer Maternal Grandmother      Cancer Paternal Grandmother      Cancer Paternal Grandfather      HEART DISEASE Father      Hypertension Father      Diabetes Father      Diabetes Sister      Diabetes Maternal Grandfather            Reviewed and updated as needed this visit by clinical staff       Reviewed and updated as needed this visit by Provider         ROS:  Constitutional, HEENT, cardiovascular, pulmonary, gi and gu systems are negative, except as otherwise noted.    OBJECTIVE:     /72 (BP Location: Right arm, Patient Position: Chair, Cuff Size: Adult Large)  Pulse 116  Temp 99  F (37.2  C) (Oral)  Resp 16  Wt 181 lb (82.1 kg)  LMP 2007  SpO2 97%  BMI 33.11 kg/m2  Body mass index is 33.11 kg/(m^2).  GENERAL APPEARANCE: healthy, alert and no distress  RESP: lungs clear to auscultation - no rales, rhonchi or wheezes  CV: regular rates and rhythm, normal S1 S2, no S3 or S4 and no murmur, click or rub  NEURO: Normal strength and tone, mentation intact and speech  normal  PSYCH: mentation appears normal and affect normal/bright        ASSESSMENT/PLAN:             1. S/P hip replacement, left  Await labs. F/u with ortho  - CBC with platelets    2. Low hemoglobin  See #1. H/o anemia  - CBC with platelets    3. Chronic allergic rhinitis, unspecified seasonality, unspecified trigger  Refilled.   - fluticasone (FLONASE) 50 MCG/ACT spray; Spray 1 spray into both nostrils daily as needed for rhinitis or allergies  Dispense: 3 Bottle; Refill: 3        Roxana Cuadra PA-C  [facility]

## 2018-08-13 ENCOUNTER — TRANSFERRED RECORDS (OUTPATIENT)
Dept: HEALTH INFORMATION MANAGEMENT | Facility: CLINIC | Age: 58
End: 2018-08-13

## 2018-08-25 DIAGNOSIS — F41.8 SITUATIONAL ANXIETY: ICD-10-CM

## 2018-08-27 NOTE — TELEPHONE ENCOUNTER
Routing refill request to provider for review/approval because:  Last prescribed by Dr. Nik Barnes, RN

## 2018-08-27 NOTE — TELEPHONE ENCOUNTER
"Requested Prescriptions   Pending Prescriptions Disp Refills     hydrOXYzine (ATARAX) 25 MG tablet [Pharmacy Med Name: hydrOXYzine HCL 25MG TAB]  Last Written Prescription Date:  7/31/2018  Last Fill Quantity: 60 tablet,  # refills: 0   Last office visit: 8/9/2018 with prescribing provider:  Khalif calderon 1     Sig: TAKE ONE TO TWO TABLETS BY MOUTH EVERY 6 HOURS AS NEEDED FOR ANXIETY    Antihistamines Protocol Passed    8/25/2018 10:50 AM       Passed - Recent (12 mo) or future (30 days) visit within the authorizing provider's specialty    Patient had office visit in the last 12 months or has a visit in the next 30 days with authorizing provider or within the authorizing provider's specialty.  See \"Patient Info\" tab in inbasket, or \"Choose Columns\" in Meds & Orders section of the refill encounter.           Passed - Patient is age 3 or older    Apply age and/or weight-based dosing for peds patients age 3 and older.    Forward request to provider for patients under the age of 3.            "

## 2018-08-28 RX ORDER — HYDROXYZINE HYDROCHLORIDE 25 MG/1
TABLET, FILM COATED ORAL
Qty: 90 TABLET | Refills: 1 | Status: SHIPPED | OUTPATIENT
Start: 2018-08-28 | End: 2018-10-30

## 2018-09-10 ENCOUNTER — TRANSFERRED RECORDS (OUTPATIENT)
Dept: HEALTH INFORMATION MANAGEMENT | Facility: CLINIC | Age: 58
End: 2018-09-10

## 2018-09-24 ENCOUNTER — MYC MEDICAL ADVICE (OUTPATIENT)
Dept: FAMILY MEDICINE | Facility: CLINIC | Age: 58
End: 2018-09-24

## 2018-09-24 DIAGNOSIS — Z29.8 * * * SBE PROPHYLAXIS * * *: ICD-10-CM

## 2018-09-24 NOTE — TELEPHONE ENCOUNTER
"Requested Prescriptions   Pending Prescriptions Disp Refills     amoxicillin (AMOXIL) 500 MG capsule  Last Written Prescription Date:  6/8/2018  Last Fill Quantity: 4 capsule,  # refills: 0   Last office visit: 8/9/2018 with prescribing provider:  Khalif      4 capsule 0     Sig: Take 4 capsules (2,000 mg) by mouth once for 1 dose    Oral Acne/Rosacea Medications Protocol Failed    9/24/2018 11:16 AM       Failed - Confirmation of diagnosis is required    Please confirm diagnosis is acne or rosacea.     If NOT acne or rosacea; refer request to provider for further evaluation.    If diagnosis IS acne or rosacea, OK to refill BASED ON PREVIOUS REFILL CLINICAL NOTE RECOMMENDATION.         Passed - Patient is 12 years of age or older       Passed - Recent (12 mo) or future (30 days) visit within the authorizing provider's specialty    Patient had office visit in the last 12 months or has a visit in the next 30 days with authorizing provider or within the authorizing provider's specialty.  See \"Patient Info\" tab in inbasket, or \"Choose Columns\" in Meds & Orders section of the refill encounter.           Passed - No active pregnancy on record       Passed - No positive prenancy test is past 12 months          "

## 2018-09-26 RX ORDER — AMOXICILLIN 500 MG/1
2000 CAPSULE ORAL ONCE
Qty: 4 CAPSULE | Refills: 0 | Status: SHIPPED | OUTPATIENT
Start: 2018-09-26 | End: 2019-01-26

## 2018-10-25 ENCOUNTER — TRANSFERRED RECORDS (OUTPATIENT)
Dept: HEALTH INFORMATION MANAGEMENT | Facility: CLINIC | Age: 58
End: 2018-10-25

## 2018-11-06 ENCOUNTER — OFFICE VISIT (OUTPATIENT)
Dept: FAMILY MEDICINE | Facility: CLINIC | Age: 58
End: 2018-11-06
Payer: COMMERCIAL

## 2018-11-06 VITALS
BODY MASS INDEX: 31.83 KG/M2 | HEART RATE: 97 BPM | SYSTOLIC BLOOD PRESSURE: 122 MMHG | DIASTOLIC BLOOD PRESSURE: 80 MMHG | TEMPERATURE: 98.2 F | RESPIRATION RATE: 14 BRPM | OXYGEN SATURATION: 98 % | WEIGHT: 174 LBS

## 2018-11-06 DIAGNOSIS — Z01.818 PREOP GENERAL PHYSICAL EXAM: Primary | ICD-10-CM

## 2018-11-06 DIAGNOSIS — Z12.31 VISIT FOR SCREENING MAMMOGRAM: ICD-10-CM

## 2018-11-06 DIAGNOSIS — Z23 NEED FOR PROPHYLACTIC VACCINATION AND INOCULATION AGAINST INFLUENZA: ICD-10-CM

## 2018-11-06 DIAGNOSIS — F51.04 PSYCHOPHYSIOLOGICAL INSOMNIA: ICD-10-CM

## 2018-11-06 DIAGNOSIS — E78.5 HYPERLIPIDEMIA LDL GOAL <130: ICD-10-CM

## 2018-11-06 DIAGNOSIS — F41.9 ANXIETY: ICD-10-CM

## 2018-11-06 LAB
ERYTHROCYTE [DISTWIDTH] IN BLOOD BY AUTOMATED COUNT: 15.4 % (ref 10–15)
HCT VFR BLD AUTO: 39.1 % (ref 35–47)
HGB BLD-MCNC: 12.3 G/DL (ref 11.7–15.7)
MCH RBC QN AUTO: 25.7 PG (ref 26.5–33)
MCHC RBC AUTO-ENTMCNC: 31.5 G/DL (ref 31.5–36.5)
MCV RBC AUTO: 82 FL (ref 78–100)
MRSA DNA SPEC QL NAA+PROBE: NEGATIVE
PLATELET # BLD AUTO: 291 10E9/L (ref 150–450)
RBC # BLD AUTO: 4.79 10E12/L (ref 3.8–5.2)
SPECIMEN SOURCE: NORMAL
WBC # BLD AUTO: 6.7 10E9/L (ref 4–11)

## 2018-11-06 PROCEDURE — 85027 COMPLETE CBC AUTOMATED: CPT | Performed by: PHYSICIAN ASSISTANT

## 2018-11-06 PROCEDURE — 87641 MR-STAPH DNA AMP PROBE: CPT | Performed by: PHYSICIAN ASSISTANT

## 2018-11-06 PROCEDURE — 99214 OFFICE O/P EST MOD 30 MIN: CPT | Mod: 25 | Performed by: PHYSICIAN ASSISTANT

## 2018-11-06 PROCEDURE — 83550 IRON BINDING TEST: CPT | Performed by: PHYSICIAN ASSISTANT

## 2018-11-06 PROCEDURE — 36415 COLL VENOUS BLD VENIPUNCTURE: CPT | Performed by: PHYSICIAN ASSISTANT

## 2018-11-06 PROCEDURE — 90682 RIV4 VACC RECOMBINANT DNA IM: CPT | Performed by: PHYSICIAN ASSISTANT

## 2018-11-06 PROCEDURE — 90471 IMMUNIZATION ADMIN: CPT | Performed by: PHYSICIAN ASSISTANT

## 2018-11-06 PROCEDURE — 80048 BASIC METABOLIC PNL TOTAL CA: CPT | Performed by: PHYSICIAN ASSISTANT

## 2018-11-06 PROCEDURE — 80061 LIPID PANEL: CPT | Performed by: PHYSICIAN ASSISTANT

## 2018-11-06 PROCEDURE — 83540 ASSAY OF IRON: CPT | Performed by: PHYSICIAN ASSISTANT

## 2018-11-06 PROCEDURE — 82728 ASSAY OF FERRITIN: CPT | Performed by: PHYSICIAN ASSISTANT

## 2018-11-06 PROCEDURE — 87640 STAPH A DNA AMP PROBE: CPT | Mod: 59 | Performed by: PHYSICIAN ASSISTANT

## 2018-11-06 RX ORDER — SERTRALINE HYDROCHLORIDE 100 MG/1
TABLET, FILM COATED ORAL
Qty: 135 TABLET | Refills: 1 | Status: SHIPPED | OUTPATIENT
Start: 2018-11-06 | End: 2019-03-11

## 2018-11-06 RX ORDER — ATORVASTATIN CALCIUM 40 MG/1
40 TABLET, FILM COATED ORAL DAILY
Qty: 90 TABLET | Refills: 3 | Status: SHIPPED | OUTPATIENT
Start: 2018-11-06 | End: 2019-03-11

## 2018-11-06 NOTE — MR AVS SNAPSHOT
After Visit Summary   11/6/2018    Delilah Miranda    MRN: [id number]           Patient Information     Date Of Birth          1960        Visit Information        Provider Department      11/6/2018 10:00 AM Roxana Cuadra PA-C [facility]        Today's Diagnoses     Preop general physical exam    -  1    Visit for screening mammogram        Screening for HIV (human immunodeficiency virus)        Need for prophylactic vaccination and inoculation against influenza          Care Instructions      Before Your Surgery      Call your surgeon if there is any change in your health. This includes signs of a cold or flu (such as a sore throat, runny nose, cough, rash or fever).    Do not smoke, drink alcohol or take over the counter medicine (unless your surgeon or primary care doctor tells you to) for the 24 hours before and after surgery.    If you take prescribed drugs: Follow your doctor s orders about which medicines to take and which to stop until after surgery.    Eating and drinking prior to surgery: follow the instructions from your surgeon    Take a shower or bath the night before surgery. Use the soap your surgeon gave you to gently clean your skin. If you do not have soap from your surgeon, use your regular soap. Do not shave or scrub the surgery site.  Wear clean pajamas and have clean sheets on your bed.           Follow-ups after your visit        Your next 10 appointments already scheduled     Nov 13, 2018   Procedure with Bryon Zaragoza MD   [facility] (--)    [address] [facility] [id number] [phone]              Who to contact     If you have questions or need follow up information about today's clinic visit or your schedule please contact [facility] directly at [phone].  Normal or non-critical lab and imaging results will be communicated to you by MyChart, letter or phone within 4 business  days after the clinic has received the results. If you do not hear from us within 7 days, please contact the clinic through [employer] or phone. If you have a critical or abnormal lab result, we will notify you by phone as soon as possible.  Submit refill requests through [employer] or call your pharmacy and they will forward the refill request to us. Please allow 3 business days for your refill to be completed.          Additional Information About Your Visit        [employer] Information     [employer] gives you secure access to your electronic health record. If you see a primary care provider, you can also send messages to your care team and make appointments. If you have questions, please call your primary care clinic.  If you do not have a primary care provider, please call [phone] and they will assist you.        Care EveryWhere ID     This is your Care EveryWhere ID. This could be used by other organizations to access your [place] medical records  [id number]        Your Vitals Were     Pulse Temperature Respirations Last Period Pulse Oximetry BMI (Body Mass Index)    97 98.2  F (36.8  C) (Oral) 14 04/25/2007 98% 31.83 kg/m2       Blood Pressure from Last 3 Encounters:   11/06/18 122/80   08/09/18 120/72   08/01/18 93/52    Weight from Last 3 Encounters:   11/06/18 174 lb (78.9 kg)   08/09/18 181 lb (82.1 kg)   07/30/18 182 lb (82.6 kg)              We Performed the Following     FLU VACCINE, (RIV4) RECOMBINANT HA  , IM (FluBlok, egg free) [43363]- >18 YRS (FMG recommended  50-64 YRS)     Vaccine Administration, Initial [37856]          Today's Medication Changes          These changes are accurate as of 11/6/18 10:10 AM.  If you have any questions, ask your nurse or doctor.               These medicines have changed or have updated prescriptions.        Dose/Directions    amitriptyline 25 MG tablet   Commonly known as:  ELAVIL   This may have changed:    - when to take this  - reasons to take this  - additional  instructions   Used for:  Psychophysiological insomnia        TAKE ONE TABLET BY MOUTH AT BEDTIME   Quantity:  90 tablet   Refills:  1                Primary Care Provider Office Phone # Fax #    Roxana Cuadra PA-C [phone] [address]  [facility] [address]        Equal Access to Services     JACKIE HANSEN : Hadii aad ku hadasho Soomaali, waaxda luqadaha, qaybta kaalmada adeegyada, waxay josemanuelin hayaan adedarren sunnileola dalton. So [facility] [phone].    ATENCIÓN: Si habla español, tiene a rodriguez disposición servicios gratuitos de asistencia lingüística. Davon al [phone].    We comply with applicable federal civil rights laws and [place] laws. We do not discriminate on the basis of race, color, national origin, age, disability, sex, sexual orientation, or gender identity.            Thank you!     Thank you for choosing [facility]  for your care. Our goal is always to provide you with excellent care. Hearing back from our patients is one way we can continue to improve our services. Please take a few minutes to complete the written survey that you may receive in the mail after your visit with us. Thank you!             Your Updated Medication List - Protect others around you: Learn how to safely use, store and throw away your medicines at www.disposemymeds.org.          This list is accurate as of 11/6/18 10:10 AM.  Always use your most recent med list.                   Brand Name Dispense Instructions for use Diagnosis    albuterol 108 (90 Base) MCG/ACT inhaler    PROAIR HFA    2 Inhaler    Inhale 2 puffs into the lungs every 6 hours as needed INHALE 1 TO 2 PUFFS EVERY 4 TO 6 HOURS AS NEEDED    Mild intermittent asthma       alpha-lipoic acid 600 MG Caps      Take 1 capsule by mouth daily        amitriptyline 25 MG tablet    ELAVIL    90 tablet    TAKE ONE TABLET BY MOUTH AT BEDTIME    Psychophysiological insomnia       ASPIRIN PO      Take 81 mg by mouth daily         atorvastatin 40 MG tablet    LIPITOR    90 tablet    Take 1 tablet (40 mg) by mouth daily    Hyperlipidemia LDL goal <130       Biotin 52960 MCG Tabs      Take 1 tablet by mouth daily        [employer] TRIPLE MAGNESIUM COMPLEX 400 MG Caps   Generic drug:  Magnesium      Take 1 capsule by mouth daily        ferrous sulfate 325 (65 Fe) MG tablet    IRON    100 tablet    Take 1 tablet (325 mg) by mouth 2 times daily (with meals)    Anemia due to blood loss, acute       fluticasone 50 MCG/ACT spray    FLONASE    3 Bottle    Spray 1 spray into both nostrils daily as needed for rhinitis or allergies    Chronic allergic rhinitis, unspecified seasonality, unspecified trigger       hydrOXYzine 25 MG tablet    ATARAX    60 tablet    Take 1-2 tablets (25-50 mg) by mouth every 6 hours as needed for anxiety    Situational anxiety       ibuprofen 800 MG tablet    ADVIL/MOTRIN     800 mg every 6 hours as needed (Will stop 7 days pre op) prn        methocarbamol 500 MG tablet    ROBAXIN    90 tablet    Take 2 tablets (1,000 mg) by mouth 3 times daily as needed for muscle spasms    Muscle spasm       order for DME     1 each    Equipment being ordered: Walker Wheels () and Walker () Treatment Diagnosis: Impaired gait s/p MEKA.    Status post total replacement of right hip       senna-docusate 8.6-50 MG per tablet    SENOKOT-S;PERICOLACE    30 tablet    Take 1 tablet by mouth 2 times daily    Status post total replacement of left hip       sertraline 100 MG tablet    ZOLOFT    135 tablet    TAKE ONE AND ONE-HALF TABLETS  BY MOUTH ONCE DAILY    Anxiety       VESICARE 5 MG tablet   Generic drug:  solifenacin     90 tablet    TAKE ONE TABLET BY MOUTH EVERY DAY    Overactive bladder

## 2018-11-06 NOTE — PROGRESS NOTES
[facility]  [id number] [place] [address]  [phone]  Dept: [phone]    PRE-OP EVALUATION:  Today's date: 2018    Delilah Miranda (: 1960) presents for pre-operative evaluation assessment as requested by Dr. Zaragoza.  She requires evaluation and anesthesia risk assessment prior to undergoing surgery/procedure for treatment of bilateral total knee arthroplasty .    Fax number for surgical facility: [facility]  Primary Physician: Roxana Cuadra  Type of Anesthesia Anticipated: Anticipates General    Patient has a Health Care Directive or Living Will:  NO    Preop Questions 2018   Who is doing your surgery? -   What are you having done? -   Date of Surgery/Procedure: -   Facility or Hospital where procedure/surgery will be performed: -   1.  Do you have a history of Heart attack, stroke, stent, coronary bypass surgery, or other heart surgery? No   2.  Do you ever have any pain or discomfort in your chest? No   3.  Do you have a history of  Heart Failure? No   4.   Are you troubled by shortness of breath when:  walking on a level surface, or up a slight hill, or at night? No   5.  Do you currently have a cold, bronchitis or other respiratory infection? No   6.  Do you have a cough, shortness of breath, or wheezing? No   7.  Do you sometimes get pains in the calves of your legs when you walk? No   8. Do you or anyone in your family have previous history of blood clots? No   9.  Do you or does anyone in your family have a serious bleeding problem such as prolonged bleeding following surgeries or cuts? No   10. Have you ever had problems with anemia or been told to take iron pills? YES - Currently taking Iron   11. Have you had any abnormal blood loss such as black, tarry or bloody stools, or abnormal vaginal bleeding? No   12. Have you ever had a blood transfusion? No   13. Have you or any of your relatives ever had problems with anesthesia? Self,  nausea with anesthesia    14. Do you have sleep apnea, excessive snoring or daytime drowsiness? YES - Sleep Apnea, has CPAP will bring to surgery   15. Do you have any prosthetic heart valves? No   16. Do you have prosthetic joints? YES - Bilateral Hips   17. Is there any chance that you may be pregnant? No         HPI:     HPI related to upcoming procedure: OA of knees      See problem list for active medical problems.  Problems all longstanding and stable, except as noted/documented.  See ROS for pertinent symptoms related to these conditions.                                                                                                                                                          .    MEDICAL HISTORY:     Patient Active Problem List    Diagnosis Date Noted     S/P hip replacement, left 08/09/2018     Priority: Medium     SHIRA (obstructive sleep apnea) 07/06/2018     Priority: Medium     S/P hip replacement, right 04/20/2018     Priority: Medium     S/P total hip arthroplasty 04/09/2018     Priority: Medium     Situational anxiety 05/31/2016     Priority: Medium     Overactive bladder 04/16/2014     Priority: Medium     Anxiety 10/31/2013     Priority: Medium     Heartburn 02/14/2013     Priority: Medium     Snoring 02/14/2013     Priority: Medium     Urinary incontinence, nocturnal enuresis 02/14/2013     Priority: Medium     Elevated fasting glucose 12/07/2012     Priority: Medium     Vitamin D deficiency 12/07/2012     Priority: Medium     Hyperlipidemia LDL goal <160 09/26/2012     Priority: Medium     OA (osteoarthritis) of knee 04/03/2012     Priority: Medium     Mild major depression (H) 04/06/2010     Priority: Medium     Mild intermittent asthma 02/01/2008     Priority: Medium     Allergic rhinitis 10/22/2003     Priority: Medium     Problem list name updated by automated process. Provider to review       Thoracic or lumbosacral neuritis or radiculitis, unspecified 10/22/2003     Priority:  Medium      Past Medical History:   Diagnosis Date     Anemia     after previous hip replacement     Anxiety 10/31/2013     Degeneration of lumbar or lumbosacral intervertebral disc     knees and hips     Mild intermittent asthma      Other chronic pain     lt hip     Personal history of tobacco use, presenting hazards to health      PONV (postoperative nausea and vomiting)      Sleep apnea     Will bring CPAP     Past Surgical History:   Procedure Laterality Date     ARTHROPLASTY HIP Right 2018    Procedure: ARTHROPLASTY HIP;  Right total hip arthroplasty using a Biomet G7 acetabulum and Taperloc femoral stem. ;  Surgeon: Bryon Zaragoza MD;  Location: [facility] OR     ARTHROPLASTY HIP Left 2018    Procedure: ARTHROPLASTY HIP;  Left total hip arthroplasty using a Biomet G7 acetabulum and Taperloc femoral stem. ;  Surgeon: Bryon Zaragoza MD;  Location: [facility] OR     ARTHROSCOPY KNEE BILATERAL       C NONSPECIFIC PROCEDURE      meniscus      SECTION       excsion of lypoma      times 3     GENITOURINARY SURGERY      bladder sling     Current Outpatient Prescriptions   Medication Sig Dispense Refill     albuterol (ALBUTEROL) 108 (90 BASE) MCG/ACT inhaler Inhale 2 puffs into the lungs every 6 hours as needed INHALE 1 TO 2 PUFFS EVERY 4 TO 6 HOURS AS NEEDED 2 Inhaler 3     alpha-lipoic acid 600 MG CAPS Take 1 capsule by mouth daily       amitriptyline (ELAVIL) 25 MG tablet TAKE ONE TABLET BY MOUTH AT BEDTIME (Patient taking differently: nightly as needed ) 90 tablet 1     ASPIRIN PO Take 81 mg by mouth daily       atorvastatin (LIPITOR) 40 MG tablet Take 1 tablet (40 mg) by mouth daily 90 tablet 3     Biotin 27551 MCG TABS Take 1 tablet by mouth daily       ferrous sulfate (IRON) 325 (65 Fe) MG tablet Take 1 tablet (325 mg) by mouth 2 times daily (with meals) 100 tablet 0     fluticasone (FLONASE) 50 MCG/ACT spray Spray 1 spray into both nostrils daily as needed for rhinitis or allergies 3  Bottle 3     hydrOXYzine (ATARAX) 25 MG tablet Take 1-2 tablets (25-50 mg) by mouth every 6 hours as needed for anxiety 60 tablet 0     ibuprofen (ADVIL/MOTRIN) 800 MG tablet 800 mg every 6 hours as needed (Will stop 7 days pre op) prn       Magnesium (CVS TRIPLE MAGNESIUM COMPLEX) 400 MG CAPS Take 1 capsule by mouth daily       methocarbamol (ROBAXIN) 500 MG tablet Take 2 tablets (1,000 mg) by mouth 3 times daily as needed for muscle spasms 90 tablet 1     order for DME Equipment being ordered: Walker Wheels () and Walker ()  Treatment Diagnosis: Impaired gait s/p MEKA. 1 each 0     senna-docusate (SENOKOT-S;PERICOLACE) 8.6-50 MG per tablet Take 1 tablet by mouth 2 times daily 30 tablet 0     sertraline (ZOLOFT) 100 MG tablet TAKE ONE AND ONE-HALF TABLETS  BY MOUTH ONCE DAILY 135 tablet 1     VESICARE 5 MG tablet TAKE ONE TABLET BY MOUTH EVERY DAY 90 tablet 2     OTC products: None, except as noted above    Allergies   Allergen Reactions     No Known Drug Allergies      Metal [Staples] Rash      Latex Allergy: NO    Social History   Substance Use Topics     Smoking status: Former Smoker     Packs/day: 0.50     Years: 5.00     Types: Cigarettes     Quit date: 3/29/1993     Smokeless tobacco: Never Used      Comment: smoked 1 pk qd in her twenties quit at age 26     Alcohol use 0.0 oz/week     0 Standard drinks or equivalent per week      Comment: 3 beers monthly     History   Drug Use No       REVIEW OF SYSTEMS:   Constitutional, neuro, ENT, endocrine, pulmonary, cardiac, gastrointestinal, genitourinary, musculoskeletal, integument and psychiatric systems are negative, except as otherwise noted.    EXAM:   [facility] 04/25/2007    GENERAL APPEARANCE: healthy, alert and no distress     EYES: EOMI, PERRL     HENT: ear canals and TM's normal and nose and mouth without ulcers or lesions     NECK: no adenopathy, no asymmetry, masses, or scars and thyroid normal to palpation     RESP: lungs clear to auscultation - no  rales, rhonchi or wheezes     CV: regular rates and rhythm, normal S1 S2, no S3 or S4 and no murmur, click or rub     ABDOMEN:  soft, nontender, no HSM or masses and bowel sounds normal     MS: extremities normal- no gross deformities noted, no evidence of inflammation in joints, FROM in all extremities.     SKIN: no suspicious lesions or rashes     NEURO: Normal strength and tone, sensory exam grossly normal, mentation intact and speech normal     PSYCH: mentation appears normal. and affect normal/bright     LYMPHATICS: No cervical adenopathy    DIAGNOSTICS:   EKG: appears normal, NSR, normal axis, normal intervals, no acute ST/T changes c/w ischemia, no LVH by voltage criteria    Results for orders placed or performed in visit on 11/06/18   CBC with platelets   Result Value Ref Range    WBC 6.7 4.0 - 11.0 10e9/L    RBC Count 4.79 3.8 - 5.2 10e12/L    Hemoglobin 12.3 11.7 - 15.7 g/dL    Hematocrit 39.1 35.0 - 47.0 %    MCV 82 78 - 100 fl    MCH 25.7 (L) 26.5 - 33.0 pg    MCHC 31.5 31.5 - 36.5 g/dL    RDW 15.4 (H) 10.0 - 15.0 %    Platelet Count 291 150 - 450 10e9/L   Methicillin Resist/Sens S. aureus PCR   Result Value Ref Range    Specimen Description Nares     Methicillin Resist/Sens S. aureus PCR Negative NEG^Negative     BMP pending.     Recent Labs   Lab Test  08/09/18   1620  08/01/18   0636   07/30/18   1226  07/06/18   0858   01/10/18   0800   HGB  10.1*  8.2*   < >   --   12.6   < >   --    PLT  527*  186   --   242  284   < >   --    NA   --   139   --    --   140   < >   --    POTASSIUM   --   4.2   --    --   4.1   < >   --    CR   --   0.57   --   0.58  0.62   < >   --    A1C   --    --    --    --   6.3*   --   6.0    < > = values in this interval not displayed.        IMPRESSION:   Reason for surgery/procedure: bilateral total knee arthroplasty  Diagnosis/reason for consult: preop    The proposed surgical procedure is considered INTERMEDIATE risk.    REVISED CARDIAC RISK INDEX  The patient has the  following serious cardiovascular risks for perioperative complications such as (MI, PE, VFib and 3  AV Block):  No serious cardiac risks  INTERPRETATION: 0 risks: Class I (very low risk - 0.4% complication rate)    The patient has the following additional risks for perioperative complications:  No identified additional risks      ICD-10-CM    1. Preop general physical exam Z01.818        RECOMMENDATIONS:         --Patient is to  Hold all scheduled medications on the day of surgery   APPROVAL GIVEN to proceed with proposed procedure, without further diagnostic evaluation       Signed Electronically by: Roxana Cuadra PA-C    Copy of this evaluation report is provided to requesting physician.    [place] Preop Guidelines    Revised Cardiac Risk Index    Injectable Influenza Immunization Documentation    1.  Is the person to be vaccinated sick today?   No    2. Does the person to be vaccinated have an allergy to a component   of the vaccine?   No  Egg Allergy Algorithm Link    3. Has the person to be vaccinated ever had a serious reaction   to influenza vaccine in the past?   No    4. Has the person to be vaccinated ever had Guillain-Barré syndrome?   No    Form completed by Jose Perez MA

## 2018-11-07 LAB
ANION GAP SERPL CALCULATED.3IONS-SCNC: 9 MMOL/L (ref 3–14)
BUN SERPL-MCNC: 16 MG/DL (ref 7–30)
CALCIUM SERPL-MCNC: 9.5 MG/DL (ref 8.5–10.1)
CHLORIDE SERPL-SCNC: 106 MMOL/L (ref 94–109)
CHOLEST SERPL-MCNC: 160 MG/DL
CO2 SERPL-SCNC: 27 MMOL/L (ref 20–32)
CREAT SERPL-MCNC: 0.65 MG/DL (ref 0.52–1.04)
FERRITIN SERPL-MCNC: 20 NG/ML (ref 8–252)
GFR SERPL CREATININE-BSD FRML MDRD: >90 ML/MIN/1.7M2
GLUCOSE SERPL-MCNC: 119 MG/DL (ref 70–99)
HDLC SERPL-MCNC: 60 MG/DL
IRON SATN MFR SERPL: 7 % (ref 15–46)
IRON SERPL-MCNC: 32 UG/DL (ref 35–180)
LDLC SERPL CALC-MCNC: 70 MG/DL
NONHDLC SERPL-MCNC: 100 MG/DL
POTASSIUM SERPL-SCNC: 4.3 MMOL/L (ref 3.4–5.3)
SODIUM SERPL-SCNC: 142 MMOL/L (ref 133–144)
TIBC SERPL-MCNC: 449 UG/DL (ref 240–430)
TRIGL SERPL-MCNC: 151 MG/DL

## 2018-11-09 NOTE — H&P (VIEW-ONLY)
[facility]  [address] [place] [address]  [phone]  Dept: [phone]    PRE-OP EVALUATION:  Today's date: 2018    Delilah Miranda (: 1960) presents for pre-operative evaluation assessment as requested by Dr. Zaragoza.  She requires evaluation and anesthesia risk assessment prior to undergoing surgery/procedure for treatment of bilateral total knee arthroplasty .    Fax number for surgical facility: [facility] [place]  Primary Physician: Roxana Cuadra  Type of Anesthesia Anticipated: Anticipates General    Patient has a Health Care Directive or Living Will:  NO    Preop Questions 2018   Who is doing your surgery? -   What are you having done? -   Date of Surgery/Procedure: -   Facility or Hospital where procedure/surgery will be performed: -   1.  Do you have a history of Heart attack, stroke, stent, coronary bypass surgery, or other heart surgery? No   2.  Do you ever have any pain or discomfort in your chest? No   3.  Do you have a history of  Heart Failure? No   4.   Are you troubled by shortness of breath when:  walking on a level surface, or up a slight hill, or at night? No   5.  Do you currently have a cold, bronchitis or other respiratory infection? No   6.  Do you have a cough, shortness of breath, or wheezing? No   7.  Do you sometimes get pains in the calves of your legs when you walk? No   8. Do you or anyone in your family have previous history of blood clots? No   9.  Do you or does anyone in your family have a serious bleeding problem such as prolonged bleeding following surgeries or cuts? No   10. Have you ever had problems with anemia or been told to take iron pills? YES - Currently taking Iron   11. Have you had any abnormal blood loss such as black, tarry or bloody stools, or abnormal vaginal bleeding? No   12. Have you ever had a blood transfusion? No   13. Have you or any of your relatives ever had problems with anesthesia? Self,  nausea with anesthesia    14. Do you have sleep apnea, excessive snoring or daytime drowsiness? YES - Sleep Apnea, has CPAP will bring to surgery   15. Do you have any prosthetic heart valves? No   16. Do you have prosthetic joints? YES - Bilateral Hips   17. Is there any chance that you may be pregnant? No         HPI:     HPI related to upcoming procedure: OA of knees      See problem list for active medical problems.  Problems all longstanding and stable, except as noted/documented.  See ROS for pertinent symptoms related to these conditions.                                                                                                                                                          .    MEDICAL HISTORY:     Patient Active Problem List    Diagnosis Date Noted     S/P hip replacement, left 08/09/2018     Priority: Medium     SHIRA (obstructive sleep apnea) 07/06/2018     Priority: Medium     S/P hip replacement, right 04/20/2018     Priority: Medium     S/P total hip arthroplasty 04/09/2018     Priority: Medium     Situational anxiety 05/31/2016     Priority: Medium     Overactive bladder 04/16/2014     Priority: Medium     Anxiety 10/31/2013     Priority: Medium     Heartburn 02/14/2013     Priority: Medium     Snoring 02/14/2013     Priority: Medium     Urinary incontinence, nocturnal enuresis 02/14/2013     Priority: Medium     Elevated fasting glucose 12/07/2012     Priority: Medium     Vitamin D deficiency 12/07/2012     Priority: Medium     Hyperlipidemia LDL goal <160 09/26/2012     Priority: Medium     OA (osteoarthritis) of knee 04/03/2012     Priority: Medium     Mild major depression (H) 04/06/2010     Priority: Medium     Mild intermittent asthma 02/01/2008     Priority: Medium     Allergic rhinitis 10/22/2003     Priority: Medium     Problem list name updated by automated process. Provider to review       Thoracic or lumbosacral neuritis or radiculitis, unspecified 10/22/2003     Priority:  Medium      Past Medical History:   Diagnosis Date     Anemia     after previous hip replacement     Anxiety 10/31/2013     Degeneration of lumbar or lumbosacral intervertebral disc     knees and hips     Mild intermittent asthma      Other chronic pain     lt hip     Personal history of tobacco use, presenting hazards to health      PONV (postoperative nausea and vomiting)      Sleep apnea     Will bring CPAP     Past Surgical History:   Procedure Laterality Date     ARTHROPLASTY HIP Right 2018    Procedure: ARTHROPLASTY HIP;  Right total hip arthroplasty using a Biomet G7 acetabulum and Taperloc femoral stem. ;  Surgeon: Bryon Zaragoza MD;  Location: [facility] OR     ARTHROPLASTY HIP Left 2018    Procedure: ARTHROPLASTY HIP;  Left total hip arthroplasty using a Biomet G7 acetabulum and Taperloc femoral stem. ;  Surgeon: Bryon Zaragoza MD;  Location: [facility] OR     ARTHROSCOPY KNEE BILATERAL       C NONSPECIFIC PROCEDURE      meniscus      SECTION       excsion of lypoma      times 3     GENITOURINARY SURGERY      bladder sling     Current Outpatient Prescriptions   Medication Sig Dispense Refill     albuterol (ALBUTEROL) 108 (90 BASE) MCG/ACT inhaler Inhale 2 puffs into the lungs every 6 hours as needed INHALE 1 TO 2 PUFFS EVERY 4 TO 6 HOURS AS NEEDED 2 Inhaler 3     alpha-lipoic acid 600 MG CAPS Take 1 capsule by mouth daily       amitriptyline (ELAVIL) 25 MG tablet TAKE ONE TABLET BY MOUTH AT BEDTIME (Patient taking differently: nightly as needed ) 90 tablet 1     ASPIRIN PO Take 81 mg by mouth daily       atorvastatin (LIPITOR) 40 MG tablet Take 1 tablet (40 mg) by mouth daily 90 tablet 3     Biotin 59243 MCG TABS Take 1 tablet by mouth daily       ferrous sulfate (IRON) 325 (65 Fe) MG tablet Take 1 tablet (325 mg) by mouth 2 times daily (with meals) 100 tablet 0     fluticasone (FLONASE) 50 MCG/ACT spray Spray 1 spray into both nostrils daily as needed for rhinitis or allergies 3  Bottle 3     hydrOXYzine (ATARAX) 25 MG tablet Take 1-2 tablets (25-50 mg) by mouth every 6 hours as needed for anxiety 60 tablet 0     ibuprofen (ADVIL/MOTRIN) 800 MG tablet 800 mg every 6 hours as needed (Will stop 7 days pre op) prn       Magnesium (CVS TRIPLE MAGNESIUM COMPLEX) 400 MG CAPS Take 1 capsule by mouth daily       methocarbamol (ROBAXIN) 500 MG tablet Take 2 tablets (1,000 mg) by mouth 3 times daily as needed for muscle spasms 90 tablet 1     order for DME Equipment being ordered: Walker Wheels () and Walker ()  Treatment Diagnosis: Impaired gait s/p MEKA. 1 each 0     senna-docusate (SENOKOT-S;PERICOLACE) 8.6-50 MG per tablet Take 1 tablet by mouth 2 times daily 30 tablet 0     sertraline (ZOLOFT) 100 MG tablet TAKE ONE AND ONE-HALF TABLETS  BY MOUTH ONCE DAILY 135 tablet 1     VESICARE 5 MG tablet TAKE ONE TABLET BY MOUTH EVERY DAY 90 tablet 2     OTC products: None, except as noted above    Allergies   Allergen Reactions     No Known Drug Allergies      Metal [Staples] Rash      Latex Allergy: NO    Social History   Substance Use Topics     Smoking status: Former Smoker     Packs/day: 0.50     Years: 5.00     Types: Cigarettes     Quit date: 3/29/1993     Smokeless tobacco: Never Used      Comment: smoked 1 pk qd in her twenties quit at age 26     Alcohol use 0.0 oz/week     0 Standard drinks or equivalent per week      Comment: 3 beers monthly     History   Drug Use No       REVIEW OF SYSTEMS:   Constitutional, neuro, ENT, endocrine, pulmonary, cardiac, gastrointestinal, genitourinary, musculoskeletal, integument and psychiatric systems are negative, except as otherwise noted.    EXAM:   [facility] 04/25/2007    GENERAL APPEARANCE: healthy, alert and no distress     EYES: EOMI, PERRL     HENT: ear canals and TM's normal and nose and mouth without ulcers or lesions     NECK: no adenopathy, no asymmetry, masses, or scars and thyroid normal to palpation     RESP: lungs clear to auscultation - no  rales, rhonchi or wheezes     CV: regular rates and rhythm, normal S1 S2, no S3 or S4 and no murmur, click or rub     ABDOMEN:  soft, nontender, no HSM or masses and bowel sounds normal     MS: extremities normal- no gross deformities noted, no evidence of inflammation in joints, FROM in all extremities.     SKIN: no suspicious lesions or rashes     NEURO: Normal strength and tone, sensory exam grossly normal, mentation intact and speech normal     PSYCH: mentation appears normal. and affect normal/bright     LYMPHATICS: No cervical adenopathy    DIAGNOSTICS:   EKG: appears normal, NSR, normal axis, normal intervals, no acute ST/T changes c/w ischemia, no LVH by voltage criteria    Results for orders placed or performed in visit on 11/06/18   CBC with platelets   Result Value Ref Range    WBC 6.7 4.0 - 11.0 10e9/L    RBC Count 4.79 3.8 - 5.2 10e12/L    Hemoglobin 12.3 11.7 - 15.7 g/dL    Hematocrit 39.1 35.0 - 47.0 %    MCV 82 78 - 100 fl    MCH 25.7 (L) 26.5 - 33.0 pg    MCHC 31.5 31.5 - 36.5 g/dL    RDW 15.4 (H) 10.0 - 15.0 %    Platelet Count 291 150 - 450 10e9/L   Methicillin Resist/Sens S. aureus PCR   Result Value Ref Range    Specimen Description Nares     Methicillin Resist/Sens S. aureus PCR Negative NEG^Negative     BMP pending.     Recent Labs   Lab Test  08/09/18   1620  08/01/18   0636   07/30/18   1226  07/06/18   0858   01/10/18   0800   HGB  10.1*  8.2*   < >   --   12.6   < >   --    PLT  527*  186   --   242  284   < >   --    NA   --   139   --    --   140   < >   --    POTASSIUM   --   4.2   --    --   4.1   < >   --    CR   --   0.57   --   0.58  0.62   < >   --    A1C   --    --    --    --   6.3*   --   6.0    < > = values in this interval not displayed.        IMPRESSION:   Reason for surgery/procedure: bilateral total knee arthroplasty  Diagnosis/reason for consult: preop    The proposed surgical procedure is considered INTERMEDIATE risk.    REVISED CARDIAC RISK INDEX  The patient has the  following serious cardiovascular risks for perioperative complications such as (MI, PE, VFib and 3  AV Block):  No serious cardiac risks  INTERPRETATION: 0 risks: Class I (very low risk - 0.4% complication rate)    The patient has the following additional risks for perioperative complications:  No identified additional risks      ICD-10-CM    1. Preop general physical exam Z01.818        RECOMMENDATIONS:         --Patient is to  Hold all scheduled medications on the day of surgery   APPROVAL GIVEN to proceed with proposed procedure, without further diagnostic evaluation       Signed Electronically by: Roxana Cuadra PA-C    Copy of this evaluation report is provided to requesting physician.    [place] Preop Guidelines    Revised Cardiac Risk Index    Injectable Influenza Immunization Documentation    1.  Is the person to be vaccinated sick today?   No    2. Does the person to be vaccinated have an allergy to a component   of the vaccine?   No  Egg Allergy Algorithm Link    3. Has the person to be vaccinated ever had a serious reaction   to influenza vaccine in the past?   No    4. Has the person to be vaccinated ever had Guillain-Barré syndrome?   No    Form completed by Jose Perez MA

## 2018-11-12 ENCOUNTER — ANESTHESIA EVENT (OUTPATIENT)
Dept: SURGERY | Facility: CLINIC | Age: 58
DRG: 462 | End: 2018-11-12
Payer: COMMERCIAL

## 2018-11-12 NOTE — PHARMACY-ADMISSION MEDICATION HISTORY
Medication reconciliation interview completed by pre-admitting nurse , reviewed by pharmacy. No further clarifications needed.     Prior to Admission medications    Medication Sig Last Dose Taking? Auth Provider   albuterol (ALBUTEROL) 108 (90 BASE) MCG/ACT inhaler Inhale 2 puffs into the lungs every 6 hours as needed INHALE 1 TO 2 PUFFS EVERY 4 TO 6 HOURS AS NEEDED  Yes Roxana Cuadra PA-C   alpha-lipoic acid 600 MG CAPS Take 1 capsule by mouth daily  Yes Reported, Patient   ASPIRIN PO Take 81 mg by mouth daily  Yes Reported, Patient   Biotin 25127 MCG TABS Take 1 tablet by mouth daily  Yes Reported, Patient   ferrous sulfate (IRON) 325 (65 Fe) MG tablet Take 1 tablet (325 mg) by mouth 2 times daily (with meals)  Yes Elham Domingo MD   fluticasone (FLONASE) 50 MCG/ACT spray Spray 1 spray into both nostrils daily as needed for rhinitis or allergies  Yes Roxana Cuadra PA-C   hydrOXYzine (ATARAX) 25 MG tablet Take 1-2 tablets (25-50 mg) by mouth every 6 hours as needed for anxiety  Yes Bryon Zaragoza MD   ibuprofen (ADVIL/MOTRIN) 800 MG tablet 800 mg every 6 hours as needed (Will stop 7 days pre op) prn  Yes Reported, Patient   Magnesium (CVS TRIPLE MAGNESIUM COMPLEX) 400 MG CAPS Take 1 capsule by mouth daily  Yes Reported, Patient   methocarbamol (ROBAXIN) 500 MG tablet Take 2 tablets (1,000 mg) by mouth 3 times daily as needed for muscle spasms  Yes Roxana Cuadra PA-C   senna-docusate (SENOKOT-S;PERICOLACE) 8.6-50 MG per tablet Take 1 tablet by mouth 2 times daily  Yes Bryon Zaragoza MD   VESICARE 5 MG tablet TAKE ONE TABLET BY MOUTH EVERY DAY  Yes Roxana Cuadra PA-C   amitriptyline (ELAVIL) 25 MG tablet TAKE ONE TABLET BY MOUTH AT BEDTIME   Roxana Cuadra PA-C   atorvastatin (LIPITOR) 40 MG tablet Take 1 tablet (40 mg) by mouth daily   Roxana Cuadra PA-C   order for DME Equipment being ordered: Walker Wheels () and Walker ()  Treatment Diagnosis:  Impaired gait s/p MEKA.   Bryon Zaragoza MD   sertraline (ZOLOFT) 100 MG tablet TAKE ONE AND ONE-HALF TABLETS  BY MOUTH ONCE DAILY   Roxana Cuadra PA-C

## 2018-11-13 ENCOUNTER — HOSPITAL ENCOUNTER (INPATIENT)
Facility: CLINIC | Age: 58
LOS: 3 days | Discharge: NURSING, REHAB OR LONG-TERM CARE | DRG: 462 | End: 2018-11-16
Attending: ORTHOPAEDIC SURGERY | Admitting: ORTHOPAEDIC SURGERY
Payer: COMMERCIAL

## 2018-11-13 ENCOUNTER — APPOINTMENT (OUTPATIENT)
Dept: PHYSICAL THERAPY | Facility: CLINIC | Age: 58
DRG: 462 | End: 2018-11-13
Attending: ORTHOPAEDIC SURGERY
Payer: COMMERCIAL

## 2018-11-13 ENCOUNTER — APPOINTMENT (OUTPATIENT)
Dept: GENERAL RADIOLOGY | Facility: CLINIC | Age: 58
DRG: 462 | End: 2018-11-13
Attending: ORTHOPAEDIC SURGERY
Payer: COMMERCIAL

## 2018-11-13 ENCOUNTER — ANESTHESIA (OUTPATIENT)
Dept: SURGERY | Facility: CLINIC | Age: 58
DRG: 462 | End: 2018-11-13
Payer: COMMERCIAL

## 2018-11-13 DIAGNOSIS — Z96.653 STATUS POST TOTAL BILATERAL KNEE REPLACEMENT: Primary | ICD-10-CM

## 2018-11-13 PROBLEM — Z96.659 S/P TOTAL KNEE ARTHROPLASTY: Status: ACTIVE | Noted: 2018-11-13

## 2018-11-13 PROCEDURE — 25000125 ZZHC RX 250: Performed by: ANESTHESIOLOGY

## 2018-11-13 PROCEDURE — 25000132 ZZH RX MED GY IP 250 OP 250 PS 637: Performed by: PHYSICIAN ASSISTANT

## 2018-11-13 PROCEDURE — 40000171 ZZH STATISTIC PRE-PROCEDURE ASSESSMENT III: Performed by: ORTHOPAEDIC SURGERY

## 2018-11-13 PROCEDURE — 25000125 ZZHC RX 250: Performed by: NURSE ANESTHETIST, CERTIFIED REGISTERED

## 2018-11-13 PROCEDURE — 25000128 H RX IP 250 OP 636: Performed by: ANESTHESIOLOGY

## 2018-11-13 PROCEDURE — 27810169 ZZH OR IMPLANT GENERAL: Performed by: ORTHOPAEDIC SURGERY

## 2018-11-13 PROCEDURE — 36000063 ZZH SURGERY LEVEL 4 EA 15 ADDTL MIN: Performed by: ORTHOPAEDIC SURGERY

## 2018-11-13 PROCEDURE — 12000007 ZZH R&B INTERMEDIATE

## 2018-11-13 PROCEDURE — 99222 1ST HOSP IP/OBS MODERATE 55: CPT | Performed by: PHYSICIAN ASSISTANT

## 2018-11-13 PROCEDURE — 37000009 ZZH ANESTHESIA TECHNICAL FEE, EACH ADDTL 15 MIN: Performed by: ORTHOPAEDIC SURGERY

## 2018-11-13 PROCEDURE — 0SRC0J9 REPLACEMENT OF RIGHT KNEE JOINT WITH SYNTHETIC SUBSTITUTE, CEMENTED, OPEN APPROACH: ICD-10-PCS | Performed by: ORTHOPAEDIC SURGERY

## 2018-11-13 PROCEDURE — 99207 ZZC CONSULT E&M CHANGED TO INITIAL LEVEL: CPT | Performed by: PHYSICIAN ASSISTANT

## 2018-11-13 PROCEDURE — 97161 PT EVAL LOW COMPLEX 20 MIN: CPT | Mod: GP

## 2018-11-13 PROCEDURE — 0SRD0J9 REPLACEMENT OF LEFT KNEE JOINT WITH SYNTHETIC SUBSTITUTE, CEMENTED, OPEN APPROACH: ICD-10-PCS | Performed by: ORTHOPAEDIC SURGERY

## 2018-11-13 PROCEDURE — 25000128 H RX IP 250 OP 636: Performed by: PHYSICIAN ASSISTANT

## 2018-11-13 PROCEDURE — 27210794 ZZH OR GENERAL SUPPLY STERILE: Performed by: ORTHOPAEDIC SURGERY

## 2018-11-13 PROCEDURE — 97530 THERAPEUTIC ACTIVITIES: CPT | Mod: GP

## 2018-11-13 PROCEDURE — 25000132 ZZH RX MED GY IP 250 OP 250 PS 637: Performed by: ORTHOPAEDIC SURGERY

## 2018-11-13 PROCEDURE — 36000093 ZZH SURGERY LEVEL 4 1ST 30 MIN: Performed by: ORTHOPAEDIC SURGERY

## 2018-11-13 PROCEDURE — 25000128 H RX IP 250 OP 636: Performed by: NURSE ANESTHETIST, CERTIFIED REGISTERED

## 2018-11-13 PROCEDURE — C1713 ANCHOR/SCREW BN/BN,TIS/BN: HCPCS | Performed by: ORTHOPAEDIC SURGERY

## 2018-11-13 PROCEDURE — 25000128 H RX IP 250 OP 636: Performed by: ORTHOPAEDIC SURGERY

## 2018-11-13 PROCEDURE — 25000566 ZZH SEVOFLURANE, EA 15 MIN: Performed by: ORTHOPAEDIC SURGERY

## 2018-11-13 PROCEDURE — 40000986 XR KNEE PORT BILATERAL 1/2 VW: Mod: 50

## 2018-11-13 PROCEDURE — 25800025 ZZH RX 258: Performed by: ORTHOPAEDIC SURGERY

## 2018-11-13 PROCEDURE — 25000125 ZZHC RX 250: Performed by: PHYSICIAN ASSISTANT

## 2018-11-13 PROCEDURE — 40000193 ZZH STATISTIC PT WARD VISIT

## 2018-11-13 PROCEDURE — 37000008 ZZH ANESTHESIA TECHNICAL FEE, 1ST 30 MIN: Performed by: ORTHOPAEDIC SURGERY

## 2018-11-13 PROCEDURE — 71000015 ZZH RECOVERY PHASE 1 LEVEL 2 EA ADDTL HR: Performed by: ORTHOPAEDIC SURGERY

## 2018-11-13 PROCEDURE — 71000014 ZZH RECOVERY PHASE 1 LEVEL 2 FIRST HR: Performed by: ORTHOPAEDIC SURGERY

## 2018-11-13 PROCEDURE — 97110 THERAPEUTIC EXERCISES: CPT | Mod: GP

## 2018-11-13 PROCEDURE — C1776 JOINT DEVICE (IMPLANTABLE): HCPCS | Performed by: ORTHOPAEDIC SURGERY

## 2018-11-13 DEVICE — IMP COMP TKA IBALANCE MOD TIBIAL TRAY SZ 3 AR-513-T3: Type: IMPLANTABLE DEVICE | Site: KNEE | Status: FUNCTIONAL

## 2018-11-13 DEVICE — IMPLANTABLE DEVICE: Type: IMPLANTABLE DEVICE | Site: KNEE | Status: FUNCTIONAL

## 2018-11-13 DEVICE — BONE CEMENT STRK SIMPLEX P SPEEDSET 6192-1-001: Type: IMPLANTABLE DEVICE | Site: KNEE | Status: FUNCTIONAL

## 2018-11-13 RX ORDER — FERROUS SULFATE 325(65) MG
325 TABLET ORAL 2 TIMES DAILY WITH MEALS
Status: DISCONTINUED | OUTPATIENT
Start: 2018-11-13 | End: 2018-11-16 | Stop reason: HOSPADM

## 2018-11-13 RX ORDER — TOLTERODINE 2 MG/1
2 CAPSULE, EXTENDED RELEASE ORAL DAILY
Status: DISCONTINUED | OUTPATIENT
Start: 2018-11-13 | End: 2018-11-16 | Stop reason: HOSPADM

## 2018-11-13 RX ORDER — LIDOCAINE 40 MG/G
CREAM TOPICAL
Status: DISCONTINUED | OUTPATIENT
Start: 2018-11-13 | End: 2018-11-16 | Stop reason: HOSPADM

## 2018-11-13 RX ORDER — PROPOFOL 10 MG/ML
INJECTION, EMULSION INTRAVENOUS PRN
Status: DISCONTINUED | OUTPATIENT
Start: 2018-11-13 | End: 2018-11-13

## 2018-11-13 RX ORDER — HYDROXYZINE HYDROCHLORIDE 25 MG/1
25-50 TABLET, FILM COATED ORAL EVERY 6 HOURS PRN
Qty: 40 TABLET | Refills: 0 | Status: SHIPPED | OUTPATIENT
Start: 2018-11-13 | End: 2018-11-19

## 2018-11-13 RX ORDER — CELECOXIB 200 MG/1
400 CAPSULE ORAL ONCE
Status: COMPLETED | OUTPATIENT
Start: 2018-11-13 | End: 2018-11-13

## 2018-11-13 RX ORDER — HYDROMORPHONE HYDROCHLORIDE 1 MG/ML
.3-.5 INJECTION, SOLUTION INTRAMUSCULAR; INTRAVENOUS; SUBCUTANEOUS EVERY 5 MIN PRN
Status: DISCONTINUED | OUTPATIENT
Start: 2018-11-13 | End: 2018-11-13 | Stop reason: HOSPADM

## 2018-11-13 RX ORDER — ONDANSETRON 2 MG/ML
4 INJECTION INTRAMUSCULAR; INTRAVENOUS EVERY 30 MIN PRN
Status: DISCONTINUED | OUTPATIENT
Start: 2018-11-13 | End: 2018-11-13 | Stop reason: HOSPADM

## 2018-11-13 RX ORDER — CEFAZOLIN SODIUM 1 G/50ML
1 INJECTION, SOLUTION INTRAVENOUS EVERY 8 HOURS
Status: COMPLETED | OUTPATIENT
Start: 2018-11-13 | End: 2018-11-14

## 2018-11-13 RX ORDER — NALOXONE HYDROCHLORIDE 0.4 MG/ML
.1-.4 INJECTION, SOLUTION INTRAMUSCULAR; INTRAVENOUS; SUBCUTANEOUS
Status: DISCONTINUED | OUTPATIENT
Start: 2018-11-13 | End: 2018-11-16 | Stop reason: HOSPADM

## 2018-11-13 RX ORDER — GLYCOPYRROLATE 0.2 MG/ML
INJECTION, SOLUTION INTRAMUSCULAR; INTRAVENOUS PRN
Status: DISCONTINUED | OUTPATIENT
Start: 2018-11-13 | End: 2018-11-13

## 2018-11-13 RX ORDER — BUPIVACAINE HYDROCHLORIDE AND EPINEPHRINE 2.5; 5 MG/ML; UG/ML
INJECTION, SOLUTION INFILTRATION; PERINEURAL PRN
Status: DISCONTINUED | OUTPATIENT
Start: 2018-11-13 | End: 2018-11-13

## 2018-11-13 RX ORDER — ZOLPIDEM TARTRATE 5 MG/1
5 TABLET ORAL
Status: DISCONTINUED | OUTPATIENT
Start: 2018-11-14 | End: 2018-11-16 | Stop reason: HOSPADM

## 2018-11-13 RX ORDER — SODIUM CHLORIDE, SODIUM LACTATE, POTASSIUM CHLORIDE, CALCIUM CHLORIDE 600; 310; 30; 20 MG/100ML; MG/100ML; MG/100ML; MG/100ML
INJECTION, SOLUTION INTRAVENOUS CONTINUOUS
Status: DISCONTINUED | OUTPATIENT
Start: 2018-11-13 | End: 2018-11-13 | Stop reason: HOSPADM

## 2018-11-13 RX ORDER — BUPIVACAINE HYDROCHLORIDE AND EPINEPHRINE 5; 5 MG/ML; UG/ML
INJECTION, SOLUTION PERINEURAL PRN
Status: DISCONTINUED | OUTPATIENT
Start: 2018-11-13 | End: 2018-11-13

## 2018-11-13 RX ORDER — FENTANYL CITRATE 50 UG/ML
25-50 INJECTION, SOLUTION INTRAMUSCULAR; INTRAVENOUS
Status: DISCONTINUED | OUTPATIENT
Start: 2018-11-13 | End: 2018-11-13 | Stop reason: HOSPADM

## 2018-11-13 RX ORDER — FLUTICASONE PROPIONATE 50 MCG
1 SPRAY, SUSPENSION (ML) NASAL DAILY PRN
Status: DISCONTINUED | OUTPATIENT
Start: 2018-11-13 | End: 2018-11-16 | Stop reason: HOSPADM

## 2018-11-13 RX ORDER — MAGNESIUM OXIDE 400 MG/1
400 TABLET ORAL DAILY
Status: DISCONTINUED | OUTPATIENT
Start: 2018-11-13 | End: 2018-11-16 | Stop reason: HOSPADM

## 2018-11-13 RX ORDER — OXYCODONE AND ACETAMINOPHEN 5; 325 MG/1; MG/1
1-2 TABLET ORAL EVERY 4 HOURS PRN
Qty: 60 TABLET | Refills: 0 | Status: SHIPPED | OUTPATIENT
Start: 2018-11-13 | End: 2019-10-23

## 2018-11-13 RX ORDER — CELECOXIB 200 MG/1
200 CAPSULE ORAL 2 TIMES DAILY
Status: COMPLETED | OUTPATIENT
Start: 2018-11-14 | End: 2018-11-15

## 2018-11-13 RX ORDER — ALBUTEROL SULFATE 90 UG/1
2 AEROSOL, METERED RESPIRATORY (INHALATION) EVERY 6 HOURS PRN
Status: DISCONTINUED | OUTPATIENT
Start: 2018-11-13 | End: 2018-11-16 | Stop reason: HOSPADM

## 2018-11-13 RX ORDER — HYDROXYZINE HYDROCHLORIDE 25 MG/1
25 TABLET, FILM COATED ORAL 3 TIMES DAILY PRN
Status: DISCONTINUED | OUTPATIENT
Start: 2018-11-13 | End: 2018-11-13

## 2018-11-13 RX ORDER — ONDANSETRON 4 MG/1
4 TABLET, ORALLY DISINTEGRATING ORAL EVERY 30 MIN PRN
Status: DISCONTINUED | OUTPATIENT
Start: 2018-11-13 | End: 2018-11-13 | Stop reason: HOSPADM

## 2018-11-13 RX ORDER — PANTOPRAZOLE SODIUM 40 MG/1
40 TABLET, DELAYED RELEASE ORAL ONCE
Status: COMPLETED | OUTPATIENT
Start: 2018-11-13 | End: 2018-11-13

## 2018-11-13 RX ORDER — LABETALOL HYDROCHLORIDE 5 MG/ML
10 INJECTION, SOLUTION INTRAVENOUS
Status: DISCONTINUED | OUTPATIENT
Start: 2018-11-13 | End: 2018-11-13 | Stop reason: HOSPADM

## 2018-11-13 RX ORDER — CEFAZOLIN SODIUM 1 G/3ML
1 INJECTION, POWDER, FOR SOLUTION INTRAMUSCULAR; INTRAVENOUS SEE ADMIN INSTRUCTIONS
Status: DISCONTINUED | OUTPATIENT
Start: 2018-11-13 | End: 2018-11-13 | Stop reason: HOSPADM

## 2018-11-13 RX ORDER — ONDANSETRON 2 MG/ML
4 INJECTION INTRAMUSCULAR; INTRAVENOUS EVERY 6 HOURS PRN
Status: DISCONTINUED | OUTPATIENT
Start: 2018-11-13 | End: 2018-11-16 | Stop reason: HOSPADM

## 2018-11-13 RX ORDER — METHOCARBAMOL 500 MG/1
1000 TABLET, FILM COATED ORAL 3 TIMES DAILY PRN
Status: DISCONTINUED | OUTPATIENT
Start: 2018-11-13 | End: 2018-11-16 | Stop reason: HOSPADM

## 2018-11-13 RX ORDER — LIDOCAINE 40 MG/G
CREAM TOPICAL
Status: DISCONTINUED | OUTPATIENT
Start: 2018-11-13 | End: 2018-11-13 | Stop reason: HOSPADM

## 2018-11-13 RX ORDER — HYDROXYZINE HYDROCHLORIDE 25 MG/1
25-50 TABLET, FILM COATED ORAL EVERY 6 HOURS PRN
Status: DISCONTINUED | OUTPATIENT
Start: 2018-11-13 | End: 2018-11-16 | Stop reason: HOSPADM

## 2018-11-13 RX ORDER — ACETAMINOPHEN 325 MG/1
975 TABLET ORAL EVERY 8 HOURS
Status: COMPLETED | OUTPATIENT
Start: 2018-11-13 | End: 2018-11-16

## 2018-11-13 RX ORDER — OXYCODONE HYDROCHLORIDE 5 MG/1
5-10 TABLET ORAL
Status: DISCONTINUED | OUTPATIENT
Start: 2018-11-13 | End: 2018-11-16 | Stop reason: HOSPADM

## 2018-11-13 RX ORDER — NEOSTIGMINE METHYLSULFATE 1 MG/ML
VIAL (ML) INJECTION PRN
Status: DISCONTINUED | OUTPATIENT
Start: 2018-11-13 | End: 2018-11-13

## 2018-11-13 RX ORDER — ONDANSETRON 2 MG/ML
4 INJECTION INTRAMUSCULAR; INTRAVENOUS EVERY 6 HOURS
Status: DISPENSED | OUTPATIENT
Start: 2018-11-13 | End: 2018-11-14

## 2018-11-13 RX ORDER — HYDROXYZINE HYDROCHLORIDE 25 MG/1
25 TABLET, FILM COATED ORAL EVERY 6 HOURS PRN
Status: DISCONTINUED | OUTPATIENT
Start: 2018-11-13 | End: 2018-11-13

## 2018-11-13 RX ORDER — ATORVASTATIN CALCIUM 40 MG/1
40 TABLET, FILM COATED ORAL DAILY
Status: DISCONTINUED | OUTPATIENT
Start: 2018-11-13 | End: 2018-11-16 | Stop reason: HOSPADM

## 2018-11-13 RX ORDER — SODIUM CHLORIDE 9 MG/ML
INJECTION, SOLUTION INTRAVENOUS CONTINUOUS
Status: DISCONTINUED | OUTPATIENT
Start: 2018-11-13 | End: 2018-11-16 | Stop reason: HOSPADM

## 2018-11-13 RX ORDER — ACETAMINOPHEN 325 MG/1
650 TABLET ORAL EVERY 4 HOURS PRN
Status: DISCONTINUED | OUTPATIENT
Start: 2018-11-16 | End: 2018-11-16 | Stop reason: HOSPADM

## 2018-11-13 RX ORDER — DOCUSATE SODIUM 100 MG/1
100 CAPSULE, LIQUID FILLED ORAL 2 TIMES DAILY
Status: DISCONTINUED | OUTPATIENT
Start: 2018-11-13 | End: 2018-11-16 | Stop reason: HOSPADM

## 2018-11-13 RX ORDER — AMOXICILLIN 250 MG
1 CAPSULE ORAL 2 TIMES DAILY
Qty: 30 TABLET | Refills: 0 | Status: SHIPPED | OUTPATIENT
Start: 2018-11-13 | End: 2018-11-20

## 2018-11-13 RX ORDER — PERPHENAZINE 16 MG
1 TABLET ORAL DAILY
Status: DISCONTINUED | OUTPATIENT
Start: 2018-11-13 | End: 2018-11-16 | Stop reason: HOSPADM

## 2018-11-13 RX ORDER — LIDOCAINE HYDROCHLORIDE 10 MG/ML
INJECTION, SOLUTION INFILTRATION; PERINEURAL PRN
Status: DISCONTINUED | OUTPATIENT
Start: 2018-11-13 | End: 2018-11-13

## 2018-11-13 RX ORDER — GLUCOSAMINE/D3/BOSWELLIA SERRA 1500MG-400
1 TABLET ORAL DAILY
Status: DISCONTINUED | OUTPATIENT
Start: 2018-11-13 | End: 2018-11-16 | Stop reason: HOSPADM

## 2018-11-13 RX ORDER — ONDANSETRON 4 MG/1
4 TABLET, ORALLY DISINTEGRATING ORAL EVERY 6 HOURS PRN
Status: DISCONTINUED | OUTPATIENT
Start: 2018-11-13 | End: 2018-11-16 | Stop reason: HOSPADM

## 2018-11-13 RX ORDER — AMOXICILLIN 250 MG
1 CAPSULE ORAL 2 TIMES DAILY
Status: DISCONTINUED | OUTPATIENT
Start: 2018-11-13 | End: 2018-11-16 | Stop reason: HOSPADM

## 2018-11-13 RX ORDER — FENTANYL CITRATE 50 UG/ML
INJECTION, SOLUTION INTRAMUSCULAR; INTRAVENOUS PRN
Status: DISCONTINUED | OUTPATIENT
Start: 2018-11-13 | End: 2018-11-13

## 2018-11-13 RX ORDER — ONDANSETRON 2 MG/ML
INJECTION INTRAMUSCULAR; INTRAVENOUS PRN
Status: DISCONTINUED | OUTPATIENT
Start: 2018-11-13 | End: 2018-11-13

## 2018-11-13 RX ORDER — CEFAZOLIN SODIUM 2 G/100ML
2 INJECTION, SOLUTION INTRAVENOUS
Status: COMPLETED | OUTPATIENT
Start: 2018-11-13 | End: 2018-11-13

## 2018-11-13 RX ORDER — HYDROMORPHONE HYDROCHLORIDE 1 MG/ML
.3-.5 INJECTION, SOLUTION INTRAMUSCULAR; INTRAVENOUS; SUBCUTANEOUS
Status: DISCONTINUED | OUTPATIENT
Start: 2018-11-13 | End: 2018-11-16 | Stop reason: HOSPADM

## 2018-11-13 RX ORDER — GLYCINE 1.5 G/100ML
SOLUTION IRRIGATION PRN
Status: DISCONTINUED | OUTPATIENT
Start: 2018-11-13 | End: 2018-11-13 | Stop reason: HOSPADM

## 2018-11-13 RX ORDER — DEXAMETHASONE SODIUM PHOSPHATE 4 MG/ML
INJECTION, SOLUTION INTRA-ARTICULAR; INTRALESIONAL; INTRAMUSCULAR; INTRAVENOUS; SOFT TISSUE PRN
Status: DISCONTINUED | OUTPATIENT
Start: 2018-11-13 | End: 2018-11-13

## 2018-11-13 RX ORDER — PROCHLORPERAZINE MALEATE 10 MG
10 TABLET ORAL EVERY 6 HOURS PRN
Status: DISCONTINUED | OUTPATIENT
Start: 2018-11-13 | End: 2018-11-16 | Stop reason: HOSPADM

## 2018-11-13 RX ORDER — NALOXONE HYDROCHLORIDE 0.4 MG/ML
.1-.4 INJECTION, SOLUTION INTRAMUSCULAR; INTRAVENOUS; SUBCUTANEOUS
Status: ACTIVE | OUTPATIENT
Start: 2018-11-13 | End: 2018-11-14

## 2018-11-13 RX ADMIN — BUPIVACAINE HYDROCHLORIDE AND EPINEPHRINE BITARTRATE 10 ML: 2.5; .005 INJECTION, SOLUTION EPIDURAL; INFILTRATION; INTRACAUDAL; PERINEURAL at 06:59

## 2018-11-13 RX ADMIN — FENTANYL CITRATE 100 MCG: 50 INJECTION, SOLUTION INTRAMUSCULAR; INTRAVENOUS at 09:55

## 2018-11-13 RX ADMIN — CEFAZOLIN 1 G: 1 INJECTION, POWDER, FOR SOLUTION INTRAMUSCULAR; INTRAVENOUS at 10:30

## 2018-11-13 RX ADMIN — MAGNESIUM OXIDE TAB 400 MG (241.3 MG ELEMENTAL MG) 400 MG: 400 (241.3 MG) TAB at 19:09

## 2018-11-13 RX ADMIN — Medication 1 G: at 11:18

## 2018-11-13 RX ADMIN — ROCURONIUM BROMIDE 40 MG: 10 INJECTION INTRAVENOUS at 07:30

## 2018-11-13 RX ADMIN — HYDROMORPHONE HYDROCHLORIDE 1 MG: 1 INJECTION, SOLUTION INTRAMUSCULAR; INTRAVENOUS; SUBCUTANEOUS at 07:56

## 2018-11-13 RX ADMIN — GLYCOPYRROLATE 0.8 MG: 0.2 INJECTION, SOLUTION INTRAMUSCULAR; INTRAVENOUS at 11:00

## 2018-11-13 RX ADMIN — LIDOCAINE HYDROCHLORIDE 40 MG: 10 INJECTION, SOLUTION INFILTRATION; PERINEURAL at 07:30

## 2018-11-13 RX ADMIN — CELECOXIB 400 MG: 200 CAPSULE ORAL at 06:27

## 2018-11-13 RX ADMIN — ATORVASTATIN CALCIUM 40 MG: 40 TABLET, FILM COATED ORAL at 19:09

## 2018-11-13 RX ADMIN — GLYCOPYRROLATE 0.2 MG: 0.2 INJECTION, SOLUTION INTRAMUSCULAR; INTRAVENOUS at 07:30

## 2018-11-13 RX ADMIN — DOCUSATE SODIUM 100 MG: 100 CAPSULE, LIQUID FILLED ORAL at 19:09

## 2018-11-13 RX ADMIN — HYDROMORPHONE HYDROCHLORIDE 1 MG: 1 INJECTION, SOLUTION INTRAMUSCULAR; INTRAVENOUS; SUBCUTANEOUS at 08:13

## 2018-11-13 RX ADMIN — PROPOFOL 160 MG: 10 INJECTION, EMULSION INTRAVENOUS at 07:30

## 2018-11-13 RX ADMIN — CEFAZOLIN SODIUM 2 G: 2 INJECTION, SOLUTION INTRAVENOUS at 07:23

## 2018-11-13 RX ADMIN — ACETAMINOPHEN 975 MG: 325 TABLET, FILM COATED ORAL at 14:03

## 2018-11-13 RX ADMIN — FENTANYL CITRATE 100 MCG: 50 INJECTION, SOLUTION INTRAMUSCULAR; INTRAVENOUS at 08:07

## 2018-11-13 RX ADMIN — Medication 5 MG: at 11:00

## 2018-11-13 RX ADMIN — PANTOPRAZOLE SODIUM 40 MG: 40 TABLET, DELAYED RELEASE ORAL at 06:27

## 2018-11-13 RX ADMIN — SODIUM CHLORIDE, POTASSIUM CHLORIDE, SODIUM LACTATE AND CALCIUM CHLORIDE: 600; 310; 30; 20 INJECTION, SOLUTION INTRAVENOUS at 09:24

## 2018-11-13 RX ADMIN — HYDROMORPHONE HYDROCHLORIDE 1 MG: 1 INJECTION, SOLUTION INTRAMUSCULAR; INTRAVENOUS; SUBCUTANEOUS at 10:59

## 2018-11-13 RX ADMIN — BUPIVACAINE HYDROCHLORIDE AND EPINEPHRINE BITARTRATE 10 ML: 5; .005 INJECTION, SOLUTION EPIDURAL; INTRACAUDAL; PERINEURAL at 06:54

## 2018-11-13 RX ADMIN — SERTRALINE HYDROCHLORIDE 150 MG: 50 TABLET ORAL at 14:04

## 2018-11-13 RX ADMIN — SODIUM CHLORIDE 1000 ML: 9 INJECTION, SOLUTION INTRAVENOUS at 13:25

## 2018-11-13 RX ADMIN — TOLTERODINE TARTRATE 2 MG: 2 CAPSULE, EXTENDED RELEASE ORAL at 16:07

## 2018-11-13 RX ADMIN — MIDAZOLAM 2 MG: 1 INJECTION INTRAMUSCULAR; INTRAVENOUS at 07:23

## 2018-11-13 RX ADMIN — SODIUM CHLORIDE, POTASSIUM CHLORIDE, SODIUM LACTATE AND CALCIUM CHLORIDE: 600; 310; 30; 20 INJECTION, SOLUTION INTRAVENOUS at 07:23

## 2018-11-13 RX ADMIN — BUPIVACAINE HYDROCHLORIDE AND EPINEPHRINE BITARTRATE 10 ML: 2.5; .005 INJECTION, SOLUTION EPIDURAL; INFILTRATION; INTRACAUDAL; PERINEURAL at 06:54

## 2018-11-13 RX ADMIN — Medication 1 G: at 07:38

## 2018-11-13 RX ADMIN — ONDANSETRON 4 MG: 2 INJECTION INTRAMUSCULAR; INTRAVENOUS at 19:03

## 2018-11-13 RX ADMIN — SODIUM CHLORIDE: 9 INJECTION, SOLUTION INTRAVENOUS at 15:22

## 2018-11-13 RX ADMIN — SENNOSIDES AND DOCUSATE SODIUM 1 TABLET: 8.6; 5 TABLET ORAL at 19:08

## 2018-11-13 RX ADMIN — OXYCODONE HYDROCHLORIDE 10 MG: 5 TABLET ORAL at 22:24

## 2018-11-13 RX ADMIN — DEXMEDETOMIDINE HYDROCHLORIDE 0.4 MCG/KG/HR: 100 INJECTION, SOLUTION INTRAVENOUS at 07:41

## 2018-11-13 RX ADMIN — DEXAMETHASONE SODIUM PHOSPHATE 4 MG: 4 INJECTION, SOLUTION INTRA-ARTICULAR; INTRALESIONAL; INTRAMUSCULAR; INTRAVENOUS; SOFT TISSUE at 07:30

## 2018-11-13 RX ADMIN — OXYCODONE HYDROCHLORIDE 10 MG: 5 TABLET ORAL at 19:04

## 2018-11-13 RX ADMIN — HYDROMORPHONE HYDROCHLORIDE 0.5 MG: 1 INJECTION, SOLUTION INTRAMUSCULAR; INTRAVENOUS; SUBCUTANEOUS at 17:53

## 2018-11-13 RX ADMIN — CEFAZOLIN SODIUM 1 G: 1 INJECTION, SOLUTION INTRAVENOUS at 18:18

## 2018-11-13 RX ADMIN — FENTANYL CITRATE 100 MCG: 50 INJECTION, SOLUTION INTRAMUSCULAR; INTRAVENOUS at 06:50

## 2018-11-13 RX ADMIN — RANITIDINE 150 MG: 150 TABLET ORAL at 19:09

## 2018-11-13 RX ADMIN — OXYCODONE HYDROCHLORIDE 10 MG: 5 TABLET ORAL at 16:07

## 2018-11-13 RX ADMIN — MIDAZOLAM 2 MG: 1 INJECTION INTRAMUSCULAR; INTRAVENOUS at 06:50

## 2018-11-13 RX ADMIN — ONDANSETRON 4 MG: 2 INJECTION INTRAMUSCULAR; INTRAVENOUS at 10:45

## 2018-11-13 RX ADMIN — FENTANYL CITRATE 100 MCG: 50 INJECTION, SOLUTION INTRAMUSCULAR; INTRAVENOUS at 07:30

## 2018-11-13 RX ADMIN — AMITRIPTYLINE HYDROCHLORIDE 25 MG: 25 TABLET, FILM COATED ORAL at 22:24

## 2018-11-13 RX ADMIN — BUPIVACAINE HYDROCHLORIDE AND EPINEPHRINE BITARTRATE 10 ML: 5; .005 INJECTION, SOLUTION EPIDURAL; INTRACAUDAL; PERINEURAL at 06:59

## 2018-11-13 RX ADMIN — ACETAMINOPHEN 975 MG: 325 TABLET, FILM COATED ORAL at 22:23

## 2018-11-13 RX ADMIN — HYDROMORPHONE HYDROCHLORIDE 0.3 MG: 1 INJECTION, SOLUTION INTRAMUSCULAR; INTRAVENOUS; SUBCUTANEOUS at 14:26

## 2018-11-13 RX ADMIN — SODIUM CHLORIDE, POTASSIUM CHLORIDE, SODIUM LACTATE AND CALCIUM CHLORIDE: 600; 310; 30; 20 INJECTION, SOLUTION INTRAVENOUS at 10:17

## 2018-11-13 ASSESSMENT — ACTIVITIES OF DAILY LIVING (ADL)
ADLS_ACUITY_SCORE: 12
ADLS_ACUITY_SCORE: 11

## 2018-11-13 ASSESSMENT — ENCOUNTER SYMPTOMS: DYSRHYTHMIAS: 0

## 2018-11-13 ASSESSMENT — LIFESTYLE VARIABLES: TOBACCO_USE: 1

## 2018-11-13 NOTE — BRIEF OP NOTE
[facility] Brief Operative Note    Pre-operative diagnosis: djd   Post-operative diagnosis same   Procedure: Procedure(s):  bilateral total knee arthroplasty   Surgeon(s): Surgeon(s) and Role:     * Bryon Zaragoza MD - Primary     * Darlyn Justin PA-C - Assisting   Estimated blood loss: 11 mL    Specimens: * No specimens in log *   Findings: See dictation

## 2018-11-13 NOTE — IP AVS SNAPSHOT
` `     [facility]: [phone]            Medication Administration Report for Delilah Miranda as of 11/16/18 1242   Legend:    Given Hold Not Given Due Canceled Entry Other Actions    Time Time (Time) Time  Time-Action       Inactive    Active    Linked        Medications 11/10/18 11/11/18 11/12/18 11/13/18 11/14/18 11/15/18 11/16/18    acetaminophen (TYLENOL) tablet 650 mg  Dose: 650 mg  Freq: EVERY 4 HOURS PRN Route: PO  PRN Reason: other  PRN Comment: multimodal surgical pain management along with NSAIDS and opioid medication as indicated based on pain control and physical function.  Start: 11/16/18 0000   Admin Instructions: May give first dose 4 hours after last scheduled dose of acetaminophen  Maximum acetaminophen dose from all sources = 75 mg/kg/day not to exceed 4 grams/day.    Admin. Amount: 2 tablet (2 × 325 mg tablet)  Last Admin: 11/16/18 1219  Dispense Loc: [facility]  POC: Post-procedure           1219 (650 mg)-Given           albuterol (PROAIR HFA/PROVENTIL HFA/VENTOLIN HFA) 108 (90 Base) MCG/ACT inhaler 2 puff  Dose: 2 puff  Freq: EVERY 6 HOURS PRN Route: IN  PRN Reason: wheezing  Start: 11/13/18 1303   Admin. Amount: 2 puff  Dispense Loc: [facility]  POC: Post-procedure               alpha-lipoic acid CAPS 1 capsule  Dose: 1 capsule  Freq: DAILY Route: PO  Start: 11/13/18 1315   Dispense Loc:  Main Pharmacy  POC: Post-procedure        (1336)-Not Given        (0807)-Not Given        (0812)-Not Given        (0746)-Not Given           amitriptyline (ELAVIL) tablet 25 mg  Dose: 25 mg  Freq: AT BEDTIME Route: PO  Start: 11/13/18 2200   Admin. Amount: 1 tablet (1 × 25 mg tablet)  Last Admin: 11/15/18 2100  Dispense Loc:  [facility]  POC: Post-procedure        2224 (25 mg)-Given        2104 (25 mg)-Given        2100 (25 mg)-Given        [ ] 2200           atorvastatin (LIPITOR) tablet 40 mg  Dose: 40 mg  Freq: DAILY Route: PO  Start: 11/13/18 2000   Admin. Amount: 1 tablet (1 × 40  mg tablet)  Last Admin: 11/15/18 2009  Dispense Loc:  [facility]  POC: Post-procedure        1909 (40 mg)-Given        2104 (40 mg)-Given        2009 (40 mg)-Given        [ ] 2000           benzocaine-menthol (CHLORASEPTIC) 6-10 MG lozenge 1-2 lozenge  Dose: 1-2 lozenge  Freq: EVERY 1 HOUR PRN Route: BU  PRN Reason: sore throat  PRN Comment: sore throat without fever  Start: 11/13/18 1303   Admin. Amount: 1-2 lozenge  Dispense Loc:  [facility]  POC: Post-procedure               Biotin TABS 1 tablet  Dose: 1 tablet  Freq: DAILY Route: PO  Start: 11/13/18 1315   Dispense Loc:  Main Pharmacy  POC: Post-procedure        (1337)-Not Given        (0807)-Not Given        (0812)-Not Given        (0747)-Not Given           docusate sodium (COLACE) capsule 100 mg  Dose: 100 mg  Freq: 2 TIMES DAILY Route: PO  Start: 11/13/18 2000   Admin Instructions: To prevent constipation.  Hold for loose stools.    Admin. Amount: 1 capsule (1 × 100 mg capsule)  Last Admin: 11/16/18 0810  Dispense Loc:  [facility]  POC: Post-procedure        1909 (100 mg)-Given        0801 (100 mg)-Given       2104 (100 mg)-Given        0808 (100 mg)-Given       2009 (100 mg)-Given        0810 (100 mg)-Given       [ ] 2000           enoxaparin (LOVENOX) injection 40 mg  Dose: 40 mg  Freq: EVERY 24 HOURS Route: SC  Start: 11/14/18 0900   Admin Instructions: Check to make sure start date/time is 12-24 hours post op unless documented complication, AND no sooner than 22 hours post op if spinal anesthesia used.   Continue until discharge to home. HOLD if platelet count falls below 50% of baseline or less than 100,000/ L and notify provider.    Admin. Amount: 40 mg = 0.4 mL Conc: 40 mg/0.4 mL  Last Admin: 11/16/18 0814  Dispense Loc: [facility]  Volume: 0.4 mL  POC: Post-procedure         0805 (40 mg)-Given        0812 (40 mg)-Given        0814 (40 mg)-Given           ferrous sulfate (IRON) tablet 325 mg  Dose: 325 mg  Freq: 2 TIMES DAILY WITH MEALS Route:  PO  Start: 11/13/18 1800   Admin Instructions: Absorbed best on an empty stomach. If stomach upset occurs, can take with meals.    Admin. Amount: 1 tablet (1 × 325 mg tablet)  Last Admin: 11/16/18 0811  Dispense Loc:  [facility]  POC: Post-procedure        (1815)-Not Given        0801 (325 mg)-Given       1807 (325 mg)-Given        0808 (325 mg)-Given       1737 (325 mg)-Given        0811 (325 mg)-Given       [ ] 1800           fluticasone (FLONASE) 50 MCG/ACT spray 1 spray  Dose: 1 spray  Freq: DAILY PRN Route: BOTH NOSTRIL  PRN Reasons: rhinitis,allergies  Start: 11/13/18 1303   Admin. Amount: 1 spray  Dispense Loc:  Main Pharmacy  POC: Post-procedure               HYDROmorphone (PF) (DILAUDID) injection 0.3-0.5 mg  Dose: 0.3-0.5 mg  Freq: EVERY 2 HOURS PRN Route: IV  PRN Reason: other  PRN Comment: pain control or improvement in physical function. Hold dose for analgesic side effects.  Start: 11/13/18 1303   Admin Instructions: Start at the lowest dose.  May adjust dose by 0.1 mg every 2 hours as needed.  Hold dose for analgesic side effects.  Notify provider to assess for uncontrolled pain or analgesic side effects.   Hold while on IV PCA or with regular IV opioid dosing.  For ordered IV doses 0.1-4 mg give IV Push undiluted. Administer each 2mg over 2-5 minutes.    Admin. Amount: 0.3-0.5 mg  Last Admin: 11/14/18 2032  Dispense Loc:  [facility]  POC: Post-procedure        1426 (0.3 mg)-Given       1753 (0.5 mg)-Given        0043 (0.5 mg)-Given       1047 (0.5 mg)-Given       1240 (0.5 mg)-Given       2032 (0.5 mg)-Given             hydrOXYzine (ATARAX) tablet 25-50 mg  Dose: 25-50 mg  Freq: EVERY 6 HOURS PRN Route: PO  PRN Reason: anxiety  Start: 11/13/18 1303   Admin. Amount: 1-2 tablet (1-2 × 25 mg tablet)  Last Admin: 11/14/18 1807  Dispense Loc:  [facility]  POC: Post-procedure         1152 (25 mg)-Given       1807 (50 mg)-Given             lidocaine (LMX4) kit  Freq: EVERY 1 HOUR PRN Route: Top  PRN  "Reason: pain  PRN Comment: with VAD insertion or accessing implanted port.  Start: 11/13/18 1303   Admin Instructions: Do NOT give if patient has a history of allergy to any local anesthetic or any \"ellie\" product.   Apply 30 minutes prior to VAD insertion or port access.  MAX Dose:  2.5 g (  of 5 g tube)    Dispense Loc:  Main Pharmacy  POC: Post-procedure               lidocaine 1 % 1 mL  Dose: 1 mL  Freq: EVERY 1 HOUR PRN Route: OTHER  PRN Comment: mild pain with VAD insertion or accessing implanted port  Start: 11/13/18 1303   Admin Instructions: Do NOT give if patient has a history of allergy to any local anesthetic or any \"ellie\" product. MAX dose 1 mL subcutaneous OR intradermal in divided doses.    Admin. Amount: 1 mL  Dispense Loc: [facility]  Volume: 2 mL  POC: Post-procedure               magnesium oxide (MAG-OX) tablet 400 mg  Dose: 400 mg  Freq: DAILY Route: PO  Start: 11/13/18 2000   Admin. Amount: 1 tablet (1 × 400 mg tablet)  Last Admin: 11/15/18 2009  Dispense Loc: [facility]  POC: Post-procedure        1909 (400 mg)-Given        2104 (400 mg)-Given        2009 (400 mg)-Given        [ ] 2000           methocarbamol (ROBAXIN) tablet 1,000 mg  Dose: 1,000 mg  Freq: 3 TIMES DAILY PRN Route: PO  PRN Reason: muscle spasms  Start: 11/13/18 1303   Admin. Amount: 2 tablet (2 × 500 mg tablet)  Dispense Loc: [facility]  POC: Post-procedure               naloxone (NARCAN) injection 0.1-0.4 mg  Dose: 0.1-0.4 mg  Freq: EVERY 2 MIN PRN Route: IV  PRN Reason: opioid reversal  Start: 11/13/18 1303   Admin Instructions: For respiratory rate LESS than or EQUAL to 8.  Partial reversal dose:  0.1 mg titrated q 2 minutes for Analgesia Side Effects Monitoring Sedation Level of 3 (frequently drowsy, arousable, drifts to sleep during conversation).Full reversal dose:  0.4 mg bolus for Analgesia Side Effects Monitoring Sedation Level of 4 (somnolent, minimal or no response to stimulation).  For ordered IV doses " 0.1-2mg give IVP. Give each 0.4mg over 15 seconds in emergency situations. For non-emergent situations further dilute in 9mL of NS to facilitate titration of response.    Admin. Amount: 0.1-0.4 mg = 0.25-1 mL Conc: 0.4 mg/mL  Dispense Loc: [facility]  Volume: 1 mL  POC: Post-procedure               ondansetron (ZOFRAN-ODT) ODT tab 4 mg  Dose: 4 mg  Freq: EVERY 6 HOURS PRN Route: PO  PRN Reasons: nausea,vomiting  Start: 11/13/18 1303   Admin Instructions: This is Step 1 of nausea and vomiting management.  If nausea not resolved in 15 minutes, go to Step 2 prochlorperazine (COMPAZINE). Do not push through foil backing. Peel back foil and gently remove. Place on tongue immediately. Administration with liquid unnecessary  With dry hands, peel back foil backing and gently remove tablet; do not push oral disintegrating tablet through foil backing; administer immediately on tongue and oral disintegrating tablet dissolves in seconds; then swallow with saliva; liquid not required.    Admin. Amount: 1 tablet (1 × 4 mg tablet)  Last Admin: 11/15/18 1503  Dispense Loc: [facility]  POC: Post-procedure         2101 (4 mg)-Given        1503 (4 mg)-Given           Or  ondansetron (ZOFRAN) injection 4 mg  Dose: 4 mg  Freq: EVERY 6 HOURS PRN Route: IV  PRN Reasons: nausea,vomiting  Start: 11/13/18 1303   Admin Instructions: This is Step 1 of nausea and vomiting management.  If nausea not resolved in 15 minutes, go to Step 2 prochlorperazine (COMPAZINE).  Irritant. For ordered IV doses 0.1-4 mg, give IV Push undiluted over 2-5 minutes.    Admin. Amount: 4 mg = 2 mL Conc: 4 mg/2 mL  Dispense Loc: [facility]  Infused Over: 2-5 Minutes  Volume: 2 mL  POC: Post-procedure                             oxyCODONE IR (ROXICODONE) tablet 5-10 mg  Dose: 5-10 mg  Freq: EVERY 3 HOURS PRN Route: PO  PRN Reason: other  PRN Comment: pain control or improvement in physical function. Hold dose for analgesic side effects.  Start: 11/13/18 1303   Admin  Instructions: Start with the lowest dose.    May adjust dose by 5 mg every 3 hours as needed.  Notify provider to assess for uncontrolled pain or analgesic side effects. Hold while on PCA or with regular IV opioid dosing. Maximum total is 80 mg in 24 hours.    Admin. Amount: 1-2 tablet (1-2 × 5 mg tablet)  Last Admin: 11/16/18 1219  Dispense Loc:  [facility]  POC: Post-procedure        1607 (10 mg)-Given       1904 (10 mg)-Given       2224 (10 mg)-Given        0122 (10 mg)-Given       0428 (10 mg)-Given       0810 (10 mg)-Given       1152 (10 mg)-Given       1441 (10 mg)-Given       1807 (10 mg)-Given       2129 (10 mg)-Given        0030 (10 mg)-Given       0339 (10 mg)-Given       0641 (10 mg)-Given       0944 (10 mg)-Given       1238 (10 mg)-Given       1636 (10 mg)-Given       2010 (10 mg)-Given        0041 (10 mg)-Given       0340 (10 mg)-Given       0640 (10 mg)-Given       0919 (10 mg)-Given       1219 (10 mg)-Given           polyethylene glycol (MIRALAX/GLYCOLAX) Packet 17 g  Dose: 17 g  Freq: 2 TIMES DAILY PRN Route: PO  PRN Comment: constipation  Start: 11/14/18 1423   Admin Instructions: 1 Packet = 17 grams. Mixed prescribed dose in 8 ounces of water. Follow with 8 oz. of water.    Admin. Amount: 17 g  Last Admin: 11/16/18 0811  Dispense Loc:  [facility]          0810 (17 g)-Given        0811 (17 g)-Given           prochlorperazine (COMPAZINE) injection 10 mg  Dose: 10 mg  Freq: EVERY 6 HOURS PRN Route: IV  PRN Reasons: nausea,vomiting  Start: 11/13/18 1303   Admin Instructions: This is Step 2 of nausea and vomiting management.   If nausea not resolved in 15 minutes, give metoclopramide (REGLAN), if ordered (step 3 of nausea and vomiting management)  For ordered IV doses 0.1-10 mg, give IV Push undiluted. Each 5mg over 1 minute.    Admin. Amount: 10 mg = 2 mL Conc: 5 mg/mL  Dispense Loc: [facility]  Infused Over: 1-2 Minutes  Volume: 2 mL  POC: Post-procedure              Or  prochlorperazine (COMPAZINE)  tablet 10 mg  Dose: 10 mg  Freq: EVERY 6 HOURS PRN Route: PO  PRN Reasons: nausea,vomiting  Start: 11/13/18 1303   Admin Instructions: This is Step 2 of nausea and vomiting management.   If nausea not resolved in 15 minutes, give metoclopramide (REGLAN), if ordered (step 3 of nausea and vomiting management)    Admin. Amount: 1 tablet (1 × 10 mg tablet)  Dispense Loc:  [facility]  POC: Post-procedure               ranitidine (ZANTAC) tablet 150 mg  Dose: 150 mg  Freq: 2 TIMES DAILY Route: PO  Start: 11/13/18 2000   Admin. Amount: 1 tablet (1 × 150 mg tablet)  Last Admin: 11/16/18 0811  Dispense Loc:  [facility]  POC: Post-procedure        1909 (150 mg)-Given        0801 (150 mg)-Given       2104 (150 mg)-Given        0808 (150 mg)-Given       2009 (150 mg)-Given        0811 (150 mg)-Given       [ ] 2000           senna-docusate (SENOKOT-S;PERICOLACE) 8.6-50 MG per tablet 1 tablet  Dose: 1 tablet  Freq: 2 TIMES DAILY Route: PO  Start: 11/13/18 2000   Admin. Amount: 1 tablet  Last Admin: 11/16/18 0811  Dispense Loc:  [facility]  POC: Post-procedure        1908 (1 tablet)-Given        0801 (1 tablet)-Given       2104 (1 tablet)-Given        0809 (1 tablet)-Given       2010 (1 tablet)-Given        0811 (1 tablet)-Given       [ ] 2000           sertraline (ZOLOFT) tablet 150 mg  Dose: 150 mg  Freq: DAILY Route: PO  Start: 11/13/18 1315   Admin Instructions: Dose adjusted per medication reconciliation, confirmed with patient.    Admin. Amount: 3 tablet (3 × 50 mg tablet)  Last Admin: 11/16/18 0811  Dispense Loc:  [facility]  POC: Post-procedure        1404 (150 mg)-Given        0801 (150 mg)-Given        0809 (150 mg)-Given        0811 (150 mg)-Given           sodium chloride (PF) 0.9% PF flush 3 mL  Dose: 3 mL  Freq: EVERY 8 HOURS Route: IK  Start: 11/13/18 1315   Admin Instructions: And Q1H PRN, to lock peripheral IV dormant line.    Admin. Amount: 3 mL  Last Admin: 11/16/18 0640  Dispense Loc: [facility] Floor Stock  Volume: 4  mL  POC: Post-procedure        (1326)-Not Given       (2053)-Not Given        0558 (3 mL)-Given       0805 (3 mL)-Given       1047 (3 mL)-Given       1240 (3 mL)-Given              2130 (3 mL)-Given        0641 (3 mL)-Given       1343 (3 mL)-Given       2100 (3 mL)-Given        0640 (3 mL)-Given              [ ] 2115           sodium chloride (PF) 0.9% PF flush 3 mL  Dose: 3 mL  Freq: EVERY 1 HOUR PRN Route: IK  PRN Reason: line flush  PRN Comment: for peripheral IV flush post IV meds  Start: 11/13/18 1303   Admin. Amount: 3 mL  Dispense Loc: [facility] Floor Stock  Volume: 4 mL  POC: Post-procedure               sodium chloride 0.9% infusion  Rate: 100 mL/hr   Freq: CONTINUOUS Route: IV  Start: 11/13/18 1315   Admin Instructions: Change to saline lock when PO well tolerated.    Last Admin: 11/13/18 1522  Dispense Loc: [facility] Floor Stock  Volume: 1,000 mL  POC: Post-procedure        1522 ( )-New Bag              tolterodine (DETROL LA) 24 hr capsule 2 mg  Dose: 2 mg  Freq: DAILY Route: PO  Start: 11/13/18 1315   Admin Instructions: DO NOT CRUSH.<br>Formulary alternate for Vesicare 5 mg tablet (home medication)<br>    Admin. Amount: 1 capsule (1 × 2 mg capsule)  Last Admin: 11/16/18 0811  Dispense Loc: [facility]  POC: Post-procedure        1607 (2 mg)-Given        0801 (2 mg)-Given        0809 (2 mg)-Given        0811 (2 mg)-Given           zolpidem (AMBIEN) tablet 5 mg  Dose: 5 mg  Freq: AT BEDTIME PRN Route: PO  PRN Reason: sleep  Start: 11/14/18 2000   Admin Instructions: POD 1.  Do not give unless at least 6 hours of uninterrupted sleep is expected.    Admin. Amount: 1 tablet (1 × 5 mg tablet)  Dispense Loc: [facility]  POC: Post-procedure              Completed Medications  Medications 11/10/18 11/11/18 11/12/18 11/13/18 11/14/18 11/15/18 11/16/18         Dose: 1,000 mL  Freq: ONCE Route: IV  Last Dose: 1,000 mL (11/13/18 1325)  Start: 11/13/18 1315   End: 11/13/18 1525   Admin Instructions: Give over 2 hours as soon  as pt arrives on floor    Admin. Amount: 1,000 mL  Last Admin: 18 1325  Dispense Loc: [facility] Floor Stock  Infused Over: 2 Hours  Administrations Remainin  Volume: 1,000 mL  POC: Post-procedure        1325 (1,000 mL)-New Bag                Dose: 975 mg  Freq: EVERY 8 HOURS Route: PO  Start: 18 1400   End: 18 0640   Admin Instructions: Do not use if patient has an active opioid/acetaminophen combined analgesic product ordered for pain.  Maximum acetaminophen dose from all sources = 75 mg/kg/day not to exceed 4 grams/day.    Admin. Amount: 3 tablet (3 × 325 mg tablet)  Last Admin: 18 0640  Dispense Loc:  [facility]  Administrations Remainin  POC: Post-procedure        1403 (975 mg)-Given       2223 (975 mg)-Given        0554 (975 mg)-Given       1424 (975 mg)-Given       2104 (975 mg)-Given        0641 (975 mg)-Given       1340 (975 mg)-Given       2100 (975 mg)-Given        0640 (975 mg)-Given             Dose: 1 g  Freq: EVERY 8 HOURS Route: IV  Indications of Use: PERIOPERATIVE PHARMACOPROPHYLAXIS  Start: 18 1830   End: 18 0334   Admin Instructions: First post-op dose due 8 hours after intra-op dose, see eMAR.    Admin. Amount: 1 g = 50 mL Conc: 1 g/50 mL  Last Admin: 18 0304  Dispense Loc:  Main Pharmacy  Infused Over: 30 Minutes  Administrations Remainin  Volume: 50 mL  POC: Post-procedure        1818 (1 g)-New Bag        0304 (1 g)-New Bag               Dose: 200 mg  Freq: 2 TIMES DAILY Route: PO  Start: 18 0800   End: 11/15/18 2010   Admin Instructions: Age less than 65. IF celecoxib (CELEBREX) was given pre-operatively, start celecoxib (CELEBREX) 12 hours after given.    Admin. Amount: 1 capsule (1 × 200 mg capsule)  Last Admin: 11/15/18 2010  Dispense Loc:  [facility]  Administrations Remainin  POC: Post-procedure         0802 (200 mg)-Given       2104 (200 mg)-Given        808 (200 mg)-Given        (200 mg)-Given           Discontinued  Medications  Medications 11/10/18 11/11/18 11/12/18 11/13/18 11/14/18 11/15/18 11/16/18         Freq: PRN  Start: 11/13/18 1049   End: 11/13/18 1311   Last Admin: 11/13/18 1049  POC: Intra-procedure        1049 (1,500 mL)-Given       1311-Med Discontinued            Dose: 25-50 mcg  Freq: EVERY 2 MIN PRN Route: IV  PRN Reason: other  PRN Comment: acute pain  Start: 11/13/18 1133   End: 11/13/18 1311   Admin Instructions: MAX cumulative dose = 250 mcg.   Use fentaNYL (SUBLIMAZE) initially, as a short acting agent for acute pain control. If insufficient, or a longer acting agent is needed, begin morphine or HYDROmorphone (DILAUDID) if ordered.  For ordered IV doses 1-100 mcg give IV Push undiluted over a minimum of 3-5 minutes.    Admin. Amount: 25-50 mcg = 0.5-1 mL Conc: 50 mcg/mL  Dispense Loc:  [facility] PACU  Volume: 2 mL  POC: PACU        1311-Med Discontinued            Dose: 0.3-0.5 mg  Freq: EVERY 5 MIN PRN Route: IV  PRN Reason: other  PRN Comment: acute pain.  May administer if Respiratory Rate is greater than 10  Start: 11/13/18 1133   End: 11/13/18 1311   Admin Instructions: Max cumulative dose = 2 mg  If fentaNYL (SUBLIMAZE) is also ordered, use HYDROmorphone (DILAUDID) if pain control insufficient with fentaNYL (SUBLIMAZE) or a longer acting agent is needed.  For ordered IV doses 0.1-4 mg give IV Push undiluted. Administer each 2mg over 2-5 minutes.    Admin. Amount: 0.3-0.5 mg  Dispense Loc:  [facility] PACU  POC: PACU        1311-Med Discontinued            Dose: 25 mg  Freq: 3 TIMES DAILY PRN Route: PO  PRN Reason: other  PRN Comment: pain  Start: 11/13/18 1303   End: 11/13/18 1355   Admin. Amount: 1 tablet (1 × 25 mg tablet)  Dispense Loc:  [facility]  POC: Post-procedure        1355-Med Discontinued            Dose: 25 mg  Freq: EVERY 6 HOURS PRN Route: PO  PRN Reason: itching  Start: 11/13/18 1303   End: 11/13/18 1357   Admin Instructions: Caution to be used when administering multiple Central Nervous  System (CNS) depressing meds within a short time frame.    Admin. Amount: 1 tablet (1 × 25 mg tablet)  Dispense Loc: [facility]  POC: Post-procedure        1355-Med Discontinued            Dose: 10 mg  Freq: ONCE PRN Route: IV  PRN Reason: high blood pressure  PRN Comment: for Systemic Blood Pressure greater than 160 mmHg and Heart Rate greater than 60 bpm.    Start: 18 1133   End: 18 131   Admin Instructions: For PACU USE ONLY.  DC WHEN TRANSFERRED TO FLOOR.  For ordered doses up to 80 mg, give IV Push undiluted. Give each 20 mg over 2 minutes.    Admin. Amount: 10 mg = 2 mL Conc: 5 mg/mL  Dispense Loc: [facility] PACU  Infused Over: 2-8 Minutes  Administrations Remainin  Volume: 2 mL  POC: PACU        1311-Med Discontinued            Rate: 100 mL/hr   Freq: CONTINUOUS Route: IV  Start: 18 1145   End: 18 131   Admin Instructions: Continue until IV catheter is weaned    Dispense Loc: [facility] Floor Stock  Volume: 1,000 mL  POC: PACU               1311-Med Discontinued            Dose: 0.1-0.4 mg  Freq: EVERY 2 MIN PRN Route: IV  PRN Reason: opioid reversal  Start: 18 1303   End: 18 130   Admin Instructions: For apnea or imminent respiratory arrest: give 0.4 mg IV undiluted Q 2 minutes PRN until desired degree of reversal is obtained, stop opioid and notify provider. Continue monitoring until discharge criteria are met for a minimum of 2 hours.  For severe sedation, decrease in respiratory depth, quality or respiratory rate less than 8: give 0.1 mg IV Q 2 minutes x 3 doses, stop opioid and notify provider.  Try to minimize reversal of analgesia especially in end-of-life patients  For ordered IV doses 0.1-2mg give IVP. Give each 0.4mg over 15 seconds in emergency situations. For non-emergent situations further dilute in 9mL of NS to facilitate titration of response.    Admin. Amount: 0.1-0.4 mg = 0.25-1 mL Conc: 0.4 mg/mL  Dispense Loc: [facility]  Volume: 1 mL  POC: Post-procedure          1302-Med Discontinued           Dose: 4 mg  Freq: EVERY 6 HOURS Route: IV  Start: 18 131   End: 18   Admin Instructions: Irritant. For ordered IV doses 0.1-4 mg, give IV Push undiluted over 2-5 minutes.    Admin. Amount: 4 mg = 2 mL Conc: 4 mg/2 mL  Last Admin: 18 0800  Dispense Loc: [facility]  Infused Over: 2-5 Minutes  Administrations Remainin  Volume: 2 mL  POC: Post-procedure        (1359)-Not Given [C]       1903 (4 mg)-Given        0113 (4 mg)-Given       0800 (4 mg)-Given       1314-Med Discontinued           Dose: 4 mg  Freq: EVERY 30 MIN PRN Route: PO  PRN Reason: nausea  Start: 18   End: 18   Admin Instructions: MAX total dose = 8 mg, including OR dosing. If not resolved in 15 minutes, then go to step 2 [prochlorperazine (COMPAZINE), if ordered].  With dry hands, peel back foil backing and gently remove tablet; do not push oral disintegrating tablet through foil backing; administer immediately on tongue and oral disintegrating tablet dissolves in seconds; then swallow with saliva; liquid not required.    Admin. Amount: 1 tablet (1 × 4 mg tablet)  Dispense Loc: [facility] PACU  Administrations Remainin  POC: PACU        1311-Med Discontinued         Or    Dose: 4 mg  Freq: EVERY 30 MIN PRN Route: IV  PRN Reason: nausea  Start: 18   End: 18   Admin Instructions: MAX total dose = 8 mg, including OR dosing. If not resolved in 15 minutes, then go to step 2 [prochlorperazine (COMPAZINE), if ordered].  Irritant. For ordered IV doses 0.1-4 mg, give IV Push undiluted over 2-5 minutes.    Admin. Amount: 4 mg = 2 mL Conc: 4 mg/2 mL  Dispense Loc: [facility] PACU  Infused Over: 2-5 Minutes  Administrations Remainin  Volume: 2 mL  POC: PACU        1311-Med Discontinued            Freq: PRN  Start: 18 105   End: 11/13/18 1311   Last Admin: 18 1050  POC: Intra-procedure        1050 (2,500 mL)-Given       1311-Med Discontinued        Medications 11/10/18 11/11/18 11/12/18 11/13/18 11/14/18 11/15/18 11/16/18

## 2018-11-13 NOTE — IP AVS SNAPSHOT
` ` Patient Information     Patient Name Sex     Delilah Miranda ([id number]) Female 1960       Room Bed    [address] [place] [address]      Patient Demographics     Address Phone E-mail Address    [address] [address] [phone] (Home)  [phone] (Work)  [phone] (Mobile) *Preferred* [email or web address]      Patient Ethnicity & Race     Ethnic Group Patient Race    American White      Emergency Contact(s)     Name Relation Home Work Mobile    Dariel Miranda Spouse [phone]       Documents on File        Status Date Received Description       Documents for the Patient    Face Sheet  () 10/22/03     Face Sheet Received () 09     Insurance Card  () 06     Consent Form  06     Waiver - Payment  () 06     Insurance Card  () 06     External Medication Information Consent Accepted () 01/21/10     Face Sheet Received () 05/06/10     Patient ID Received () 14     Consent for Services - Hospital/Clinic Received () 12/23/10     Privacy Notice - [place] Received 12/23/10     Insurance Card Received () 11     Other Received 11     Waiver - Payment Received () 11     Insurance Card Received () 11     Waiver - Payment Accepted () 11     External Medication Information Consent Accepted () 11     Waiver - Payment Accepted () 11     Consent for Services - Hospital/Clinic Received () 11     Waiver - Payment Accepted () 11     Waiver - Payment Received () 10/24/11     External Medication Information Consent Accepted () 12     Waiver - Payment Received () 12     Consent for Services - Hospital/Clinic Received () 12     Insurance Card Received () 12     Insurance Card Received () 13 [employer]One    Consent for EHR Access   13 Copied from existing Consent for services - C/HOD collected on 2012    [facility] Specified Other       External Medication Information Consent Accepted 13     Consent for Services - Hospital/Clinic Received () 13     Consent for Services - Hospital/Clinic Received () 08/15/14     Consent for Services - Hospital/Clinic Received () 10/15/15     Consent for Services/Privacy Notice - Hospital/Clinic Received () 16     Insurance Card Received () 17 PO    Patient ID Received 18 ex 19    Business/Insurance/Care Coordination/Health Form - Patient  16 ADA ACCOMMODATION FORM    Consent for Services/Privacy Notice - Hospital/Clinic Received () 17     HIM JOAN Authorization  17 [employer]/ [employer]/ [employer]    Insurance Card Received () 17 P1    Consent to Communicate Received 17 PHI AUTH    Care Everywhere Prospective Auth Received 10/27/17     Consent for Services/Privacy Notice - Hospital/Clinic Received 18     Consent for Services - Hospital and Clinic Received 18     HIE Auth Received 18     Insurance Card Received 18 PO 2018    Privacy Notice - [place] Received (Deleted) 10/22/03     Power of  Received (Deleted) 18        Documents for the Encounter    CMS IM for Patient Signature         Admission Information     Attending Provider Admitting Provider Admission Type Admission Date/Time    Bryon Zaragoza MD Nemanich, Joseph Patrick, MD Elective 18  0538    Discharge Date Hospital Service Auth/Cert Status Service Area     Surgery Incomplete [facility] SERVICES    Unit Room/Bed Admission Status        ORTHO SPINE  Admission (Confirmed)       Admission     Complaint    djd, S/P total knee arthroplasty      Hospital Account     Name Acct ID Class Status Primary Coverage    Delilah Miranda [id number] Inpatient Open PREFERREDONE -  PREFERREDONE HMO            Guarantor Account (for Hospital Account #[id number])     Name Relation to Pt Service Area Active? Acct Type    Delilah Miranda  FCS Yes Personal/Family    Address Phone          [address] [place] [address] [phone](H)  [phone](O)              Coverage Information (for Hospital Account #[id number])     F/O Payor/Plan Precert #    PREFERREDONE/PREFERREDONE HMO     Subscriber Subscriber #    Delilah Miranda [id number]    Address Phone    [address] [address]  [place] [address] [phone]

## 2018-11-13 NOTE — OP NOTE
Procedure Date: 11/13/2018      PREOPERATIVE DIAGNOSIS:  Bilateral knee osteoarthritis.      POSTOPERATIVE DIAGNOSIS:  Bilateral knee osteoarthritis.      PROCEDURE:  Bilateral total knee arthroplasty using Arthrex iBalance knee system.      SURGEON:  Bryon Zaragoza MD.       FIRST ASSISTANT:  Darlyn Justin PA-C.      INDICATIONS:  Ms. Miranda is a very pleasant 58-year-old female who has had ongoing bilateral knee pain for some time now.  She has failed conservative management with oral analgesics, activity modifications and injections and now wishes to proceed with operative intervention.  We had a long discussion of the risks, benefits and alternatives of total knee arthroplasty and she was to proceed with surgery.  She understands the unique risk of bilateral total knee arthroplasty and wishes to proceed with that.  She is a reasonable candidate and we will proceed accordingly.      NARRATIVE EVENTS:  After thorough preoperative evaluation and proper identification of the patient's extremity to be operated on, Ms. Miranda was taken to the operating room where she underwent bilateral adductor canal blocks as well as a general anesthetic, placed supine on the operating table and her bilateral knees were prepped and draped in the usual sterile manner.  After appropriate surgical pause to confirm the patient's extremity to be operated, the patient received 2 grams of Ancef, her left leg was exsanguinated and tourniquet was raised to 300 mmHg on the left upper thigh.  We approached her left knee initially through a midline incision centered over the patella.  Skin and soft tissue sharply dissected down to the patella where a median parapatellar arthrotomy was performed.  We performed a mild medial release according to our preoperative templating, everted the patella, removed the patellar fat pad, flexed the knee, removed the osteophytes from the distal femur, drilled and placed our intramedullary guide for our  distal femoral resection.  Resected the distal femur at 5 degrees physiologic valgus to the femur, resecting 9 mm of distal femoral bone.  Once this was done, we then sized the distal femur, sized to fit a size 3 Arthrex iBalance femoral component, pinned our 4-in-1 cutting block in 3 degrees of external rotation of the posterior condyles in line with the epicondylar axis perpendicular to Whitesides line.  We made our anterior, posterior and chamfer sections.  We then proceed with the tibia.  We resected this perpendicular to the long axis of the tibia using extramedullary guides.  Once this was done, we then removed the excess soft tissue of the knee, mainly both cruciate ligaments and both meniscus.  We then made our box cut for a posterior stabilized femoral component and placed our trial implants into position.  A size 3 tibia, 3 femur, and a 10 mm thick PS insert gave us good stability and full range of motion and the patella tracked nicely.  We then paid our attention to the patella which measured 19 mm in thickness prior to resection, resected this down to 12 mm and placed an 8 mm thick x 27 mm diameter round tri-peg patellar button in position.  With patella and button in position, this measured 20 mm tracked quite nicely.  At this point, marked our tibial rotation, punched for our tibial keel, thoroughly irrigated the knee, prepared to cement in our final components.  Using one batch of Simplex SpeedSet cement, we cemented in a size 3 femur, size 3 tibia and a 27 round tri-peg patellar button.  Once the cement had cured, we removed all the excess cement from the knee, retrialed our polyethylene inserts and found that a 10 mm PS insert gave the best stability and full range of motion.  This was then impacted into position.  We then thoroughly irrigated the knee, closed the arthrotomy with #1 and 0 Vicryl sutures, closed the skin and soft tissue with absorbable sutures and sutures in the skin due to the  patient's staple allergy.  The patient was then placed in a well-padded postoperative dressing.        We then paid our attention to the right knee.  The right leg was exsanguinated and tourniquet was raised to 300 mmHg on the right upper thigh.  We approached her right knee through a midline incision centered over the patella.  Skin and soft tissue were sharply dissected down to the patella where a median parapatellar arthrotomy was performed.  We everted the patella, removed the infrapatellar fat pad, flexed the knee, removed the osteophytes from the distal femur, drilled and placed our intramedullary guide for our distal femoral resection.  We resected the distal femur at 5 degrees physiologic valgus to the femur, resecting 9 mm of distal femoral bone.  Once this was done, we then sized the distal femur, sized to fit a size 3 Arthrex iBalance femoral component.  We pinned our 4-in-1 cutting block in 3 degrees of external rotation to the posterior condyles in line with the epicondylar axis perpendicular to Whitesides line.  We made our anterior, posterior and chamfer sections and then proceeded with the tibia.  Here we resected this perpendicular to the long axis of the tibia using extramedullary guides.  Once this was done, we then removed the excess soft tissue and the knee, mainly both cruciate ligaments and both meniscus.  Once this was done, we then made our box cut for posterior stabilized femoral component and then placed our trial implants into position.  A size 3 femur, size 3 tibia and a size 9 PS polyethylene insert gave us good stability and full range of motion and the patella tracked nicely.  We then paid attention to the patella which measured 18 mm in thickness prior to resection.  We resected this down to 12 mm and placed an 8 mm thick x 27 mm diameter round tri-peg patellar button in position.  With patella and button in position, this measured 20 mm and tracked quite nicely.  At this point, we  then marked our tibial rotation, we punched for our tibial keel, thoroughly irrigated the knee, prepared to cement in our final components.  Using one batch of Simplex SpeedSet cement, we cemented in a size 3 femur, a size 3 tibia and a 27 round tri-peg patellar button.  Once the cement had cured, we removed all the excess cement from the knee, retrialed our polyethylene inserts, found that a 9 mm insert gave the best stability and full range of motion so after thoroughly irrigating the knee with both saline and IrriSept solution, we impacted a size 9 mm thick PS polyethylene insert.  Once this was done, we then closed the arthrotomy with #1 and 0 Vicryl sutures.  We placed one drain deep to the arthrotomy and closed the skin and soft tissues with absorbable sutures and sutures in the skin.  The patient was placed in a well-padded postoperative dressing, taken to recovery room in stable condition.  She tolerated both procedures without difficulty.      Continuation added D&T:  18 Document #[id number] NTS/lg                  DARIANA GRAVES MD             D: 2018   T: 2018   MT: GEORGI      Name:     STEPHEN WHITTINGTON   MRN:      [id number]        Account:        [id number]   :      1960           Procedure Date: 2018      Document: [id number]

## 2018-11-13 NOTE — ANESTHESIA POSTPROCEDURE EVALUATION
Patient: Delilah Miranda    Procedure(s):  bilateral total knee arthroplasty    Diagnosis:djd  Diagnosis Additional Information: Pre-operative diagnosis: djd  Post-operative diagnosis same  Procedure: Procedure(s):  bilateral total knee arthroplasty  Surgeon(s): Surgeon(s) and Role:        Anesthesia Type:  General, ETT    Note:  Anesthesia Post Evaluation    Patient location during evaluation: PACU  Patient participation: Able to fully participate in evaluation  Level of consciousness: awake  Pain management: adequate  Airway patency: patent  Cardiovascular status: acceptable  Respiratory status: acceptable  Hydration status: euvolemic  PONV: controlled     Anesthetic complications: None          Last vitals:  Vitals:    11/13/18 1215 11/13/18 1230 11/13/18 1245   BP: (!) 120/93 113/72 113/64   Pulse:      Resp: 18 13 9   Temp:      SpO2: 98% 95% 94%         Electronically Signed By: Greg Ramsay MD  November 13, 2018  1:01 PM

## 2018-11-13 NOTE — IP AVS SNAPSHOT
MRN:[id number]                      After Visit Summary   11/13/2018    Delilah Miranda    MRN: [id number]           Thank you!     Thank you for choosing [facility] for your care. Our goal is always to provide you with excellent care. Hearing back from our patients is one way we can continue to improve our services. Please take a few minutes to complete the written survey that you may receive in the mail after you visit. If you would like to speak to someone directly about your visit please contact Patient Relations at [phone]. Thank you!          Patient Information     Date Of Birth          1960        Designated Caregiver       Most Recent Value    Caregiver    Will someone help with your care after discharge? yes    Name of designated caregiver Dariel Miranda    Phone number of caregiver     Caregiver address same as pt      About your hospital stay     You were admitted on:  November 13, 2018 You last received care in the:  [facility]    You were discharged on:  November 16, 2018        Reason for your hospital stay       Following bilateral total knee arthroplasty                  Who to Call     For medical emergencies, please call 911.  For non-urgent questions about your medical care, please call your primary care provider or clinic, [phone]  For questions related to your surgery, please call your surgery clinic        Attending Provider     Provider Specialty    Bryon Zaragoza MD Orthopaedic Surgery       Primary Care Provider Office Phone # Fax #    Roxana Cuadra PA-C [phone]      After Care Instructions     Activity - Up with assistive device           Advance Diet as Tolerated       Follow this diet upon discharge: regular            General info for SNF       Length of Stay Estimate: Short Term Care: Estimated # of Days <30  Condition at Discharge: Improving  Level of care:skilled    Rehabilitation Potential: Excellent  Admission H&P remains valid and up-to-date: Yes  Recent Chemotherapy: N/A  Use Nursing Home Standing Orders: Yes            Mantoux instructions       Give two-step Mantoux (PPD) Per Facility Policy Yes            Wound care       Site:   Bilateral knees  Instructions:  Leave aquacel dressing in place until post-op follow-up.  If it becomes loose or soiled then remove and replace with daily dry dressings.                  Follow-up Appointments     Follow Up and recommended labs and tests       Follow up with Darlyn Justin PA-C within 12-16 days from surgery.  If you do not already have a follow up appointment scheduled, please call our [place] office: [phone].  No follow up labs or test are needed.                  Additional Services     Occupational Therapy Adult Consult       Evaluate and treat as clinically indicated.    Reason:  Bilateral total knee arthroplasty            Physical Therapy Adult Consult       Evaluate and treat as clinically indicated.    Reason:  Bilateral total knee arthroplasty                  Further instructions from your care team       Return to clinic in 10-14 days.  Call [phone] to schedule or if you experience any problems before your scheduled appointment.    1. Do exercises at home as instructed by therapist twice a day.   2. Ice knee after exercises and therapy.  3. Examine dressing daily for problems.                                                                                                                                                                                                4. May shower, dressing is waterproof and stays in place until dr. Follow up visit .   5. Notify your dentist of your implant so you can get antibiotics before any dental work or before any invasive procedure     Call your physician if:   1. Fever greater than 101 degrees with body chills or excessive sweating.  2. Increased redness,  localized warmth, tenderness, drainage or swelling at dressing site.    3. Pain not controlled with oral pain medications, ice and rest.   4. No bowel movement in 3 days (may use Milk of Magnesia or other over the counter remedy).  5. Chest pain, shortness of breath, and/or calf pain with excessive swelling.  6. Generalized feeling of illness such as nausea/vomiting and/or lightheaded/dizziness.  7. Any other questions or concerns related to your surgery/recovery.    Thank you for allowing [facility] to participate in your cares!!        Pending Results     No orders found from 11/11/2018 to 11/14/2018.            Statement of Approval     Ordered          11/13/18 1828  I have reviewed and agree with all the recommendations and orders detailed in this document.  EFFECTIVE NOW     Approved and electronically signed by:  Darlyn Justin PA-C             Admission Information     Date & Time Provider Department Dept. Phone    11/13/2018 Bryon Zaragoza MD [facility] [phone]      Your Vitals Were     Blood Pressure Pulse Temperature Respirations Height Weight    119/60 90 98.2  F (36.8  C) 16 1.524 m (5') 78 kg (172 lb)    Last Period Pulse Oximetry BMI (Body Mass Index)             04/25/2007 97% 33.59 kg/m2         MyChart Information     [employer] gives you secure access to your electronic health record. If you see a primary care provider, you can also send messages to your care team and make appointments. If you have questions, please call your primary care clinic.  If you do not have a primary care provider, please call [phone] and they will assist you.        Care EveryWhere ID     This is your Care EveryWhere ID. This could be used by other organizations to access your [facility] medical records  [id number]        Equal Access to Services     JACKIE HANSEN : Kenzie Lowe, vidhya pelletier, mikala henriquez  laisaias dalton. So [facility] [phone].    ATENCIÓN: Si francinela mikhail, tiene a rodriguez disposición servicios gratuitos de asistencia lingüística. Davon jung [phone].    We comply with applicable federal civil rights laws and [place] laws. We do not discriminate on the basis of race, color, national origin, age, disability, sex, sexual orientation, or gender identity.               Review of your medicines      START taking        Dose / Directions    oxyCODONE-acetaminophen 5-325 MG per tablet   Commonly known as:  PERCOCET        Dose:  1-2 tablet   Take 1-2 tablets by mouth every 4 hours as needed for severe pain   Quantity:  60 tablet   Refills:  0         CONTINUE these medicines which may have CHANGED, or have new prescriptions. If we are uncertain of the size of tablets/capsules you have at home, strength may be listed as something that might have changed.        Dose / Directions    aspirin 325 MG EC tablet   This may have changed:    - medication strength  - how much to take  - how to take this  - when to take this  - additional instructions        Take one Aspirin tab twice daily for 5 weeks, then resume regular dose daily.   Quantity:  70 tablet   Refills:  3       * ferrous sulfate 325 (65 Fe) MG tablet   Commonly known as:  IRON   This may have changed:  Another medication with the same name was added. Make sure you understand how and when to take each.   Used for:  Anemia due to blood loss, acute        Dose:  325 mg   Take 1 tablet (325 mg) by mouth 2 times daily (with meals)   Quantity:  100 tablet   Refills:  0       * ferrous sulfate 325 (65 Fe) MG tablet   Commonly known as:  IRON   This may have changed:  You were already taking a medication with the same name, and this prescription was added. Make sure you understand how and when to take each.        Dose:  325 mg   Take 1 tablet (325 mg) by mouth 2 times daily   Quantity:  60 tablet   Refills:  0       * hydrOXYzine 25 MG tablet   Commonly known as:   ATARAX   This may have changed:  Another medication with the same name was added. Make sure you understand how and when to take each.   Used for:  Situational anxiety        Dose:  25-50 mg   Take 1-2 tablets (25-50 mg) by mouth every 6 hours as needed for anxiety   Quantity:  60 tablet   Refills:  0       * hydrOXYzine 25 MG tablet   Commonly known as:  ATARAX   This may have changed:  You were already taking a medication with the same name, and this prescription was added. Make sure you understand how and when to take each.        Dose:  25-50 mg   Take 1-2 tablets (25-50 mg) by mouth every 6 hours as needed for itching   Quantity:  40 tablet   Refills:  0       * methocarbamol 500 MG tablet   Commonly known as:  ROBAXIN   This may have changed:  Another medication with the same name was added. Make sure you understand how and when to take each.   Used for:  Muscle spasm        Dose:  1000 mg   Take 2 tablets (1,000 mg) by mouth 3 times daily as needed for muscle spasms   Quantity:  90 tablet   Refills:  1       * methocarbamol 750 MG tablet   Commonly known as:  ROBAXIN   This may have changed:  You were already taking a medication with the same name, and this prescription was added. Make sure you understand how and when to take each.        Dose:  750 mg   Take 1 tablet (750 mg) by mouth 4 times daily as needed for muscle spasms   Quantity:  180 tablet   Refills:  0       * senna-docusate 8.6-50 MG per tablet   Commonly known as:  SENOKOT-S;PERICOLACE   This may have changed:  Another medication with the same name was added. Make sure you understand how and when to take each.   Used for:  Status post total replacement of left hip        Dose:  1 tablet   Take 1 tablet by mouth 2 times daily   Quantity:  30 tablet   Refills:  0       * senna-docusate 8.6-50 MG per tablet   Commonly known as:  SENOKOT-S;PERICOLACE   This may have changed:  You were already taking a medication with the same name, and this  prescription was added. Make sure you understand how and when to take each.        Dose:  1 tablet   Take 1 tablet by mouth 2 times daily   Quantity:  30 tablet   Refills:  0       * Notice:  This list has 8 medication(s) that are the same as other medications prescribed for you. Read the directions carefully, and ask your doctor or other care provider to review them with you.      CONTINUE these medicines which have NOT CHANGED        Dose / Directions    albuterol 108 (90 Base) MCG/ACT inhaler   Commonly known as:  PROAIR HFA   Used for:  Mild intermittent asthma        Dose:  2 puff   Inhale 2 puffs into the lungs every 6 hours as needed INHALE 1 TO 2 PUFFS EVERY 4 TO 6 HOURS AS NEEDED   Quantity:  2 Inhaler   Refills:  3       alpha-lipoic acid 600 MG Caps        Dose:  1 capsule   Take 1 capsule by mouth daily   Refills:  0       amitriptyline 25 MG tablet   Commonly known as:  ELAVIL   Used for:  Psychophysiological insomnia        TAKE ONE TABLET BY MOUTH AT BEDTIME   Quantity:  90 tablet   Refills:  1       atorvastatin 40 MG tablet   Commonly known as:  LIPITOR   Used for:  Hyperlipidemia LDL goal <130        Dose:  40 mg   Take 1 tablet (40 mg) by mouth daily   Quantity:  90 tablet   Refills:  3       Biotin 86829 MCG Tabs        Dose:  1 tablet   Take 1 tablet by mouth daily   Refills:  0       [employer] TRIPLE MAGNESIUM COMPLEX 400 MG Caps   Generic drug:  Magnesium        Dose:  1 capsule   Take 1 capsule by mouth daily   Refills:  0       fluticasone 50 MCG/ACT spray   Commonly known as:  FLONASE   Used for:  Chronic allergic rhinitis, unspecified seasonality, unspecified trigger        Dose:  1 spray   Spray 1 spray into both nostrils daily as needed for rhinitis or allergies   Quantity:  3 Bottle   Refills:  3       ibuprofen 800 MG tablet   Commonly known as:  ADVIL/MOTRIN        Dose:  800 mg   800 mg every 6 hours as needed (Will stop 7 days pre op) prn   Refills:  0       order for DME   Used for:   Status post total replacement of right hip        Equipment being ordered: Walker Wheels () and Walker () Treatment Diagnosis: Impaired gait s/p MEKA.   Quantity:  1 each   Refills:  0       sertraline 100 MG tablet   Commonly known as:  ZOLOFT   Used for:  Anxiety        TAKE ONE AND ONE-HALF TABLETS  BY MOUTH ONCE DAILY   Quantity:  135 tablet   Refills:  1       VESICARE 5 MG tablet   Used for:  Overactive bladder   Generic drug:  solifenacin        TAKE ONE TABLET BY MOUTH EVERY DAY   Quantity:  90 tablet   Refills:  2            Where to get your medicines      These medications were sent to [facility] [address] [facility]  [address] [facility] [address]     Phone:  [phone]     aspirin 325 MG EC tablet    hydrOXYzine 25 MG tablet    senna-docusate 8.6-50 MG per tablet         Some of these will need a paper prescription and others can be bought over the counter. Ask your nurse if you have questions.     Bring a paper prescription for each of these medications     oxyCODONE-acetaminophen 5-325 MG per tablet       You don't need a prescription for these medications     ferrous sulfate 325 (65 Fe) MG tablet    methocarbamol 750 MG tablet                Protect others around you: Learn how to safely use, store and throw away your medicines at www.disposemymeds.org.        Information about OPIOIDS     PRESCRIPTION OPIOIDS: WHAT YOU NEED TO KNOW   We gave you an opioid (narcotic) pain medicine. It is important to manage your pain, but opioids are not always the best choice. You should first try all the other options your care team gave you. Take this medicine for as short a time (and as few doses) as possible.    Some activities can increase your pain, such as bandage changes or therapy sessions. It may help to take your pain medicine 30 to 60 minutes before these activities. Reduce your stress by getting enough sleep, working on hobbies you enjoy and  practicing relaxation or meditation. Talk to your care team about ways to manage your pain beyond prescription opioids.    These medicines have risks:    DO NOT drive when on new or higher doses of pain medicine. These medicines can affect your alertness and reaction times, and you could be arrested for driving under the influence (DUI). If you need to use opioids long-term, talk to your care team about driving.    DO NOT operate heavy machinery    DO NOT do any other dangerous activities while taking these medicines.    DO NOT drink any alcohol while taking these medicines.     If the opioid prescribed includes acetaminophen, DO NOT take with any other medicines that contain acetaminophen. Read all labels carefully. Look for the word  acetaminophen  or  Tylenol.  Ask your pharmacist if you have questions or are unsure.    You can get addicted to pain medicines, especially if you have a history of addiction (chemical, alcohol or substance dependence). Talk to your care team about ways to reduce this risk.    All opioids tend to cause constipation. Drink plenty of water and eat foods that have a lot of fiber, such as fruits, vegetables, prune juice, apple juice and high-fiber cereal. Take a laxative (Miralax, milk of magnesia, Colace, Senna) if you don t move your bowels at least every other day. Other side effects include upset stomach, sleepiness, dizziness, throwing up, tolerance (needing more of the medicine to have the same effect), physical dependence and slowed breathing.    Store your pills in a secure place, locked if possible. We will not replace any lost or stolen medicine. If you don t finish your medicine, please throw away (dispose) as directed by your pharmacist. The [place] Pollution [employer] has more information about safe disposal: https://www.pca.[facility].[place].us/living-green/managing-unwanted-medications             Medication List: This is a list of all your medications and when to take them.  Check marks below indicate your daily home schedule. Keep this list as a reference.      Medications           Morning Afternoon Evening Bedtime As Needed    albuterol 108 (90 Base) MCG/ACT inhaler   Commonly known as:  PROAIR HFA   Inhale 2 puffs into the lungs every 6 hours as needed INHALE 1 TO 2 PUFFS EVERY 4 TO 6 HOURS AS NEEDED                                alpha-lipoic acid 600 MG Caps   Take 1 capsule by mouth daily                                amitriptyline 25 MG tablet   Commonly known as:  ELAVIL   TAKE ONE TABLET BY MOUTH AT BEDTIME   Last time this was given:  25 mg on 11/15/2018  9:00 PM                                aspirin 325 MG EC tablet   Take one Aspirin tab twice daily for 5 weeks, then resume regular dose daily.                                atorvastatin 40 MG tablet   Commonly known as:  LIPITOR   Take 1 tablet (40 mg) by mouth daily   Last time this was given:  40 mg on 11/15/2018  8:09 PM                                Biotin 90015 MCG Tabs   Take 1 tablet by mouth daily                                CVS TRIPLE MAGNESIUM COMPLEX 400 MG Caps   Take 1 capsule by mouth daily   Generic drug:  Magnesium                                * ferrous sulfate 325 (65 Fe) MG tablet   Commonly known as:  IRON   Take 1 tablet (325 mg) by mouth 2 times daily (with meals)   Last time this was given:  325 mg on 11/16/2018  8:11 AM                                * ferrous sulfate 325 (65 Fe) MG tablet   Commonly known as:  IRON   Take 1 tablet (325 mg) by mouth 2 times daily   Last time this was given:  325 mg on 11/16/2018  8:11 AM                                fluticasone 50 MCG/ACT spray   Commonly known as:  FLONASE   Spray 1 spray into both nostrils daily as needed for rhinitis or allergies                                * hydrOXYzine 25 MG tablet   Commonly known as:  ATARAX   Take 1-2 tablets (25-50 mg) by mouth every 6 hours as needed for anxiety   Last time this was given:  50 mg on 11/14/2018   6:07 PM                                * hydrOXYzine 25 MG tablet   Commonly known as:  ATARAX   Take 1-2 tablets (25-50 mg) by mouth every 6 hours as needed for itching   Last time this was given:  50 mg on 11/14/2018  6:07 PM                                ibuprofen 800 MG tablet   Commonly known as:  ADVIL/MOTRIN   800 mg every 6 hours as needed (Will stop 7 days pre op) prn                                * methocarbamol 500 MG tablet   Commonly known as:  ROBAXIN   Take 2 tablets (1,000 mg) by mouth 3 times daily as needed for muscle spasms                                * methocarbamol 750 MG tablet   Commonly known as:  ROBAXIN   Take 1 tablet (750 mg) by mouth 4 times daily as needed for muscle spasms                                order for DME   Equipment being ordered: Walker Wheels () and Walker () Treatment Diagnosis: Impaired gait s/p MEKA.                                oxyCODONE-acetaminophen 5-325 MG per tablet   Commonly known as:  PERCOCET   Take 1-2 tablets by mouth every 4 hours as needed for severe pain                                * senna-docusate 8.6-50 MG per tablet   Commonly known as:  SENOKOT-S;PERICOLACE   Take 1 tablet by mouth 2 times daily   Last time this was given:  1 tablet on 11/16/2018  8:11 AM                                * senna-docusate 8.6-50 MG per tablet   Commonly known as:  SENOKOT-S;PERICOLACE   Take 1 tablet by mouth 2 times daily   Last time this was given:  1 tablet on 11/16/2018  8:11 AM                                sertraline 100 MG tablet   Commonly known as:  ZOLOFT   TAKE ONE AND ONE-HALF TABLETS  BY MOUTH ONCE DAILY   Last time this was given:  150 mg on 11/16/2018  8:11 AM                                VESICARE 5 MG tablet   TAKE ONE TABLET BY MOUTH EVERY DAY   Generic drug:  solifenacin                                * Notice:  This list has 8 medication(s) that are the same as other medications prescribed for you. Read the directions carefully,  and ask your doctor or other care provider to review them with you.

## 2018-11-13 NOTE — IP AVS SNAPSHOT
[facility] ORTHO SPINE: [phone]                                              INTERAGENCY TRANSFER FORM - PHYSICIAN ORDERS   2018                    Hospital Admission Date: 2018  STEPHEN WHITTINGTON   : 1960  Sex: Female        Attending Provider: Bryon Zaragoza MD     Allergies:  No Known Drug Allergies, Metal [Staples]    Infection:  None   Service:  SURGERY    Ht:  1.524 m (5')   Wt:  78 kg (172 lb)   Admission Wt:  78.9 kg (174 lb)    BMI:  33.59 kg/m 2   BSA:  1.82 m 2            Patient PCP Information     Provider PCP Type    Roxana Cuadra PA-C General      ED Clinical Impression     Diagnosis Description Comment Added By Time Added    Status post total bilateral knee replacement [Z96.653] Status post total bilateral knee replacement [Z96.653]  Darlyn Justin PA-C 2018  6:25 PM      Hospital Problems as of 2018              Priority Class Noted POA    S/P total knee arthroplasty Medium  2018 Yes      Non-Hospital Problems as of 2018              Priority Class Noted    Allergic rhinitis Medium  10/22/2003    Thoracic or lumbosacral neuritis or radiculitis, unspecified Medium  10/22/2003    Mild intermittent asthma Medium  2008    Mild major depression (H) Medium  2010    OA (osteoarthritis) of knee Medium  4/3/2012    Hyperlipidemia LDL goal <160 Medium  2012    Elevated fasting glucose Medium  2012    Vitamin D deficiency Medium  2012    Heartburn Medium  2013    Snoring Medium  2013    Urinary incontinence, nocturnal enuresis Medium  2013    Anxiety Medium  10/31/2013    Overactive bladder Medium  2014    Situational anxiety Medium  2016    S/P total hip arthroplasty Medium  2018    S/P hip replacement, right Medium  2018    SHIRA (obstructive sleep apnea) Medium  2018    S/P hip replacement, left Medium  2018      Code Status History     Date Active Date Inactive Code  Status Order ID Comments User Context    11/16/2018 12:24 PM  Full Code [id number]  Jacoby Guido MD Outpatient         Medication Review      START taking        Dose / Directions Comments    oxyCODONE-acetaminophen 5-325 MG per tablet   Commonly known as:  PERCOCET        Dose:  1-2 tablet   Take 1-2 tablets by mouth every 4 hours as needed for severe pain   Quantity:  60 tablet   Refills:  0          CONTINUE these medications which may have CHANGED, or have new prescriptions. If we are uncertain of the size of tablets/capsules you have at home, strength may be listed as something that might have changed.        Dose / Directions Comments    aspirin 325 MG EC tablet   This may have changed:    - medication strength  - how much to take  - how to take this  - when to take this  - additional instructions        Take one Aspirin tab twice daily for 5 weeks, then resume regular dose daily.   Quantity:  70 tablet   Refills:  3        hydrOXYzine 25 MG tablet   Commonly known as:  ATARAX   This may have changed:  reasons to take this        Dose:  25-50 mg   Take 1-2 tablets (25-50 mg) by mouth every 6 hours as needed for itching   Quantity:  40 tablet   Refills:  0        methocarbamol 750 MG tablet   Commonly known as:  ROBAXIN   This may have changed:    - medication strength  - how much to take  - when to take this        Dose:  750 mg   Take 1 tablet (750 mg) by mouth 4 times daily as needed for muscle spasms   Quantity:  180 tablet   Refills:  0          CONTINUE these medications which have NOT CHANGED        Dose / Directions Comments    albuterol 108 (90 Base) MCG/ACT inhaler   Commonly known as:  PROAIR HFA   Used for:  Mild intermittent asthma        Dose:  2 puff   Inhale 2 puffs into the lungs every 6 hours as needed INHALE 1 TO 2 PUFFS EVERY 4 TO 6 HOURS AS NEEDED   Quantity:  2 Inhaler   Refills:  3        alpha-lipoic acid 600 MG Caps        Dose:  1 capsule   Take 1 capsule by mouth daily   Refills:   0        amitriptyline 25 MG tablet   Commonly known as:  ELAVIL   Used for:  Psychophysiological insomnia        TAKE ONE TABLET BY MOUTH AT BEDTIME   Quantity:  90 tablet   Refills:  1        atorvastatin 40 MG tablet   Commonly known as:  LIPITOR   Used for:  Hyperlipidemia LDL goal <130        Dose:  40 mg   Take 1 tablet (40 mg) by mouth daily   Quantity:  90 tablet   Refills:  3        Biotin 41135 MCG Tabs        Dose:  1 tablet   Take 1 tablet by mouth daily   Refills:  0        [employer]S TRIPLE MAGNESIUM COMPLEX 400 MG Caps   Generic drug:  Magnesium        Dose:  1 capsule   Take 1 capsule by mouth daily   Refills:  0        ferrous sulfate 325 (65 Fe) MG tablet   Commonly known as:  IRON   Used for:  Anemia due to blood loss, acute        Dose:  325 mg   Take 1 tablet (325 mg) by mouth 2 times daily (with meals)   Quantity:  100 tablet   Refills:  0        fluticasone 50 MCG/ACT spray   Commonly known as:  FLONASE   Used for:  Chronic allergic rhinitis, unspecified seasonality, unspecified trigger        Dose:  1 spray   Spray 1 spray into both nostrils daily as needed for rhinitis or allergies   Quantity:  3 Bottle   Refills:  3        ibuprofen 800 MG tablet   Commonly known as:  ADVIL/MOTRIN        Dose:  800 mg   800 mg every 6 hours as needed (Will stop 7 days pre op) prn   Refills:  0        order for DME   Used for:  Status post total replacement of right hip        Equipment being ordered: Walker Wheels () and Walker () Treatment Diagnosis: Impaired gait s/p MEKA.   Quantity:  1 each   Refills:  0        senna-docusate 8.6-50 MG per tablet   Commonly known as:  SENOKOT-S;PERICOLACE        Dose:  1 tablet   Take 1 tablet by mouth 2 times daily   Quantity:  30 tablet   Refills:  0        sertraline 100 MG tablet   Commonly known as:  ZOLOFT   Used for:  Anxiety        TAKE ONE AND ONE-HALF TABLETS  BY MOUTH ONCE DAILY   Quantity:  135 tablet   Refills:  1        VESICARE 5 MG tablet   Used for:   Overactive bladder   Generic drug:  solifenacin        TAKE ONE TABLET BY MOUTH EVERY DAY   Quantity:  90 tablet   Refills:  2                  Further instructions from your care team       Return to clinic in 10-14 days.  Call [phone] to schedule or if you experience any problems before your scheduled appointment.    1. Do exercises at home as instructed by therapist twice a day.   2. Ice knee after exercises and therapy.  3. Examine dressing daily for problems.                                                                                                                                                                                                4. May shower, dressing is waterproof and stays in place until dr. Follow up visit .   5. Notify your dentist of your implant so you can get antibiotics before any dental work or before any invasive procedure     Call your physician if:   1. Fever greater than 101 degrees with body chills or excessive sweating.  2. Increased redness, localized warmth, tenderness, drainage or swelling at dressing site.    3. Pain not controlled with oral pain medications, ice and rest.   4. No bowel movement in 3 days (may use Milk of Magnesia or other over the counter remedy).  5. Chest pain, shortness of breath, and/or calf pain with excessive swelling.  6. Generalized feeling of illness such as nausea/vomiting and/or lightheaded/dizziness.  7. Any other questions or concerns related to your surgery/recovery.    Thank you for allowing [facility] to participate in your cares!!        Summary of Visit     Reason for your hospital stay       Following bilateral total knee arthroplasty             After Care     Activity - Up with assistive device           Advance Diet as Tolerated       Follow this diet upon discharge: regular       General info for SNF       Length of Stay Estimate: Short Term Care: Estimated # of Days <30  Condition at Discharge: Improving  Level of  care:skilled   Rehabilitation Potential: Excellent  Admission H&P remains valid and up-to-date: Yes  Recent Chemotherapy: N/A  Use Nursing Home Standing Orders: Yes       Mantoux instructions       Give two-step Mantoux (PPD) Per Facility Policy Yes       Wound care       Site:   Bilateral knees  Instructions:  Leave aquacel dressing in place until post-op follow-up.  If it becomes loose or soiled then remove and replace with daily dry dressings.             Referrals     Occupational Therapy Adult Consult       Evaluate and treat as clinically indicated.    Reason:  Bilateral total knee arthroplasty       Physical Therapy Adult Consult       Evaluate and treat as clinically indicated.    Reason:  Bilateral total knee arthroplasty             Follow-Up Appointment Instructions     Future Labs/Procedures    Follow Up and recommended labs and tests     Comments:    Follow up with Darlyn Justin PA-C within 12-16 days from surgery.  If you do not already have a follow up appointment scheduled, please call our [place] office: [phone].  No follow up labs or test are needed.      Follow-Up Appointment Instructions     Follow Up and recommended labs and tests       Follow up with Darlyn Justin PA-C within 12-16 days from surgery.  If you do not already have a follow up appointment scheduled, please call our [place] office: [phone].  No follow up labs or test are needed.             Statement of Approval     Ordered          11/13/18 4778  I have reviewed and agree with all the recommendations and orders detailed in this document.  EFFECTIVE NOW     Approved and electronically signed by:  Darlyn Justin PA-C

## 2018-11-13 NOTE — ANESTHESIA CARE TRANSFER NOTE
Patient: Delilah Miranda    Procedure(s):  bilateral total knee arthroplasty    Diagnosis: djd  Diagnosis Additional Information: No value filed.    Anesthesia Type:   General, ETT     Note:  Airway :Face Mask  Patient transferred to:PACU  Handoff Report: Identifed the Patient, Identified the Reponsible Provider, Reviewed the pertinent medical history, Discussed the surgical course, Reviewed Intra-OP anesthesia mangement and issues during anesthesia, Set expectations for post-procedure period and Allowed opportunity for questions and acknowledgement of understanding      Vitals: (Last set prior to Anesthesia Care Transfer)    CRNA VITALS  11/13/2018 1050 - 11/13/2018 1126      11/13/2018             Pulse: 111    SpO2: 90 %    Resp Rate (observed): (!)  1                Electronically Signed By: SHERWIN Campbell CRNA  November 13, 2018  11:26 AM

## 2018-11-13 NOTE — ANESTHESIA PREPROCEDURE EVALUATION
PAC NOTE:       ANESTHESIA PRE EVALUATION:  Anesthesia Evaluation     .             ROS/MED HX    ENT/Pulmonary:     (+)sleep apnea, tobacco use, Intermittent asthma uses CPAP , . .   (-) Other pulmonary disease   Neurologic:     (+)neuropathy - hx Lumbosacral neuritis resolved,    (-) Other neuro hx   Cardiovascular:        (-) hypertension, CAD, CHF, arrhythmias, pulmonary hypertension and dyslipidemia   METS/Exercise Tolerance:     Hematologic:        (-) anemia   Musculoskeletal:   (+) arthritis, , , other musculoskeletal- Disc disease      GI/Hepatic:     (+) GERD Asymptomatic on medication,      (-) hepatitis   Renal/Genitourinary:      (-) renal disease   Endo:     (+) Obesity, .   (-) Type I DM, Type II DM, thyroid disease, chronic steroid usage and other endocrine disorder   Psychiatric:        (-) psychiatric history   Infectious Disease:  - neg infectious disease ROS       Malignancy:         Other:    (+) H/O Chronic Pain,                   Physical Exam      Airway   Mallampati: II  TM distance: >3 FB  Neck ROM: full    Dental     Cardiovascular   Rhythm and rate: regular and normal  (-) no murmur    Pulmonary    breath sounds clear to auscultation    Other findings: Lab Test        11/06/18 08/09/18 08/01/18                       1025          1620          0636          WBC          6.7          11.7*        6.3           HGB          12.3         10.1*        8.2*          MCV          82           85           85            PLT          291          527*         186            Lab Test        11/06/18 08/01/18 07/30/18 07/06/18                       1025          0636          1226          0858          NA           142          139           --          140           POTASSIUM    4.3          4.2           --          4.1           CHLORIDE     106          106           --          107           CO2          27           29            --          25            BUN          16            8             --          11            CR           0.65         0.57         0.58         0.62          ANIONGAP     9            4             --          8             LEONORA          9.5          8.4*          --          9.3           GLC          119*         124*          --          134*                 Anesthesia Plan      History & Physical Review  History and physical reviewed and following examination; no interval change.    ASA Status:  3 .    NPO Status:  > 8 hours    Plan for General, ETT and Periph. Nerve Block for postop pain with Propofol induction. Maintenance will be Balanced.    PONV prophylaxis:  Ondansetron (or other 5HT-3) and Dexamethasone or Solumedrol  Surgeon requests bilateral Adductor Canal Blocks. Carissa      Postoperative Care  Postoperative pain management:  IV analgesics, Oral pain medications and Peripheral nerve block (Single Shot).      Consents  Anesthetic plan, risks, benefits and alternatives discussed with:  Patient..                            .

## 2018-11-13 NOTE — ANESTHESIA PROCEDURE NOTES
Peripheral nerve/Neuraxial procedure note : Saphenous  Pre-Procedure  Performed by ANCA LEI  Referred by [facility]  Location: pre-op      Pre-Anesthestic Checklist: patient identified, IV checked, site marked, risks and benefits discussed, informed consent, monitors and equipment checked, pre-op evaluation, at physician/surgeon's request and post-op pain management    Timeout  Correct Patient: Yes   Correct Procedure: Yes   Correct Site: Yes   Correct Laterality: Yes   Correct Position: Yes   Site Marked: Yes   .   Procedure Documentation    .    Procedure:  right  Saphenous.     Ultrasound used to identify targeted nerve, plexus, or vascular marker and placed a needle adjacent to it., Ultrasound was used to visualize the spread of the anesthetic in close proximity to the above stated nerve.   Patient Prep;mask, sterile gloves, povidone-iodine 7.5% surgical scrub.  .  Needle: short bevel, insulated Needle Gauge: 20.    Needle Length (Inches) 2  Insertion Method: Single Shot.       Assessment/Narrative  Paresthesias: No.  Injection made incrementally with aspirations every 5 mL..  The placement was negative for: blood aspirated, painful injection and site bleeding.  Bolus given via needle..   Secured via.   Complications: none. Test dose of mL at. Test dose negative for signs of intravascular, subdural or intrathecal injection. Comments:  The surgeon has given a verbal order transferring care of this patient to me for the performance of a regional analgesia block for post-op pain control. It is requested of me because I am uniquely trained and qualified to perform this block and the surgeon is neither trained nor qualified to perform this procedure..    AKollitzMD

## 2018-11-13 NOTE — ANESTHESIA PROCEDURE NOTES
Peripheral nerve/Neuraxial procedure note : Saphenous  Pre-Procedure  Performed by ANCA LEI  Referred by [facility]  Location: pre-op      Pre-Anesthestic Checklist: patient identified, IV checked, site marked, risks and benefits discussed, informed consent, monitors and equipment checked, pre-op evaluation, at physician/surgeon's request and post-op pain management    Timeout  Correct Patient: Yes   Correct Procedure: Yes   Correct Site: Yes   Correct Laterality: Yes   Correct Position: Yes   Site Marked: Yes   .   Procedure Documentation    .    Procedure:  left  Saphenous.     Ultrasound used to identify targeted nerve, plexus, or vascular marker and placed a needle adjacent to it., Ultrasound was used to visualize the spread of the anesthetic in close proximity to the above stated nerve.   .  .  Needle: insulated, short bevel Needle Gauge: 20.    Needle Length (Inches) 2  Insertion Method: Single Shot.       Assessment/Narrative  Paresthesias: No.  Injection made incrementally with aspirations every 5 mL..  The placement was negative for: blood aspirated, painful injection and site bleeding.  Bolus given via needle..   Secured via.   Complications: none. Test dose of mL at. Test dose negative for signs of intravascular, subdural or intrathecal injection. Comments:  The surgeon has given a verbal order transferring care of this patient to me for the performance of a regional analgesia block for post-op pain control. It is requested of me because I am uniquely trained and qualified to perform this block and the surgeon is neither trained nor qualified to perform this procedure.

## 2018-11-13 NOTE — CONSULTS
Hospitalist Consultation      Delilah Miranda MRN# [id number]   YOB: 1960 Age: 58 year old   Date of Admission: 11/13/2018     Requesting Physician: Dr. Zaragoza  Reason for consult:  Post operative medical management           Assessment and Plan:   This patient is a 58 year old female with a PMH significant for SHIRA on CPAP, anxiety, depression, HLD, former tobacco abuse, and mild intermittent asthma who is POD 0 s/p bilateral TKA.    1. S/p bilateral TKA: doing well, cont PT/OT and pain control as per primary    2. SHIRA: continue nightly CPAP use    3. Anxiety, depression, PTSD: resume Amitriptyline, Sertraline, and PRN Atarax    4. HLD: continue Atorvastatin    5. Mild intermittent asthma: resume PRN Albuterol inhaler     DVT Prophylaxis: on Lovenox and PCDs as per primary  D/C planning: To rehab per discussion with patient              History of Present Illness:   This patient is a 58 year old female who is POD 0 s/p bilateral TKA. Intra-op report reviewed and showed no intra-op complications. I/o's reviewed, currently net +1.5 L with good UOP since OR. Hgb stable at preoperative physical at 12.3.    Overnight did pretty well, no complaints, VSS. Currently, today jaspal diet, pain controlled, denies chest pain, shortness of breath, N/V, abdominal pain, or numbness/tingling in either extremity. She has not passed flatus. O/w other medical problems have been stable, with no recent c/o illness.               Past Medical History:     Past Medical History:   Diagnosis Date     Anemia     after previous hip replacement     Anxiety 10/31/2013     Degeneration of lumbar or lumbosacral intervertebral disc     knees and hips     Mild intermittent asthma      Other chronic pain     lt hip     Personal history of tobacco use, presenting hazards to health      PONV (postoperative nausea and vomiting)      Sleep apnea     Will bring CPAP          Past Surgical History:     Past Surgical History:   Procedure  Laterality Date     ARTHROPLASTY HIP Right 2018    Procedure: ARTHROPLASTY HIP;  Right total hip arthroplasty using a Biomet G7 acetabulum and Taperloc femoral stem. ;  Surgeon: Bryon Zaragoza MD;  Location: [facility] OR     ARTHROPLASTY HIP Left 2018    Procedure: ARTHROPLASTY HIP;  Left total hip arthroplasty using a Biomet G7 acetabulum and Taperloc femoral stem. ;  Surgeon: Bryon Zaragoza MD;  Location: [facility] OR     ARTHROSCOPY KNEE BILATERAL       C NONSPECIFIC PROCEDURE      meniscus      SECTION       excsion of lypoma      times 3     GENITOURINARY SURGERY      bladder sling          Social History:     Social History   Substance Use Topics     Smoking status: Former Smoker     Packs/day: 0.50     Years: 5.00     Types: Cigarettes     Quit date: 3/29/1993     Smokeless tobacco: Never Used      Comment: smoked 1 pk qd in her twenties quit at age 26     Alcohol use 0.0 oz/week     0 Standard drinks or equivalent per week      Comment: 3 beers monthly          Family History:   Family history fully reviewed with patient and noncontributory.           Allergies:     Allergies   Allergen Reactions     No Known Drug Allergies      Metal [Staples] Rash          Medications:     Prior to Admission medications    Medication Sig Last Dose Taking? Auth Provider   alpha-lipoic acid 600 MG CAPS Take 1 capsule by mouth daily 2018 at Unknown time Yes Reported, Patient   amitriptyline (ELAVIL) 25 MG tablet TAKE ONE TABLET BY MOUTH AT BEDTIME 2018 at Unknown time Yes Roxana Cuadra PA-C   ASPIRIN PO Take 81 mg by mouth daily 2018 Yes Reported, Patient   atorvastatin (LIPITOR) 40 MG tablet Take 1 tablet (40 mg) by mouth daily 2018 at Unknown time Yes Roxana Cuadra PA-C   Biotin 37194 MCG TABS Take 1 tablet by mouth daily 2018 at Unknown time Yes Reported, Patient   ferrous sulfate (IRON) 325 (65 Fe) MG tablet Take 1 tablet (325 mg) by mouth 2 times daily (with  meals) 11/12/2018 at Unknown time Yes Elham Domingo MD   fluticasone (FLONASE) 50 MCG/ACT spray Spray 1 spray into both nostrils daily as needed for rhinitis or allergies 11/12/2018 at Unknown time Yes Roxana Cuadra PA-C   hydrOXYzine (ATARAX) 25 MG tablet Take 1-2 tablets (25-50 mg) by mouth every 6 hours as needed for anxiety 11/12/2018 at Unknown time Yes Bryon Zaragoza MD   Magnesium ([employer]S TRIPLE MAGNESIUM COMPLEX) 400 MG CAPS Take 1 capsule by mouth daily 11/12/2018 at Unknown time Yes Reported, Patient   senna-docusate (SENOKOT-S;PERICOLACE) 8.6-50 MG per tablet Take 1 tablet by mouth 2 times daily 11/12/2018 at Unknown time Yes Bryon Zaragoza MD   sertraline (ZOLOFT) 100 MG tablet TAKE ONE AND ONE-HALF TABLETS  BY MOUTH ONCE DAILY 11/12/2018 at Unknown time Yes Roxana Cuadra PA-C   VESICARE 5 MG tablet TAKE ONE TABLET BY MOUTH EVERY DAY 11/12/2018 at Unknown time Yes Roxana Cuadra PA-C   albuterol (ALBUTEROL) 108 (90 BASE) MCG/ACT inhaler Inhale 2 puffs into the lungs every 6 hours as needed INHALE 1 TO 2 PUFFS EVERY 4 TO 6 HOURS AS NEEDED More than a month at Unknown time  Roxana Cuadra PA-C   ibuprofen (ADVIL/MOTRIN) 800 MG tablet 800 mg every 6 hours as needed (Will stop 7 days pre op) prn More than a month at Unknown time  Reported, Patient   methocarbamol (ROBAXIN) 500 MG tablet Take 2 tablets (1,000 mg) by mouth 3 times daily as needed for muscle spasms More than a month at Unknown time  Roxana Cuadra PA-C   order for DME Equipment being ordered: Walker Wheels () and Walker ()  Treatment Diagnosis: Impaired gait s/p MEKA.   Bryon Zaragoza MD          Review of Systems:   A comprehensive greater than 10 system review of systems was carried out.  Pertinent positives and negatives are noted above.  Otherwise negative for contributory info.            Physical Exam:   Vitals were reviewed  Blood pressure 121/66, pulse 90,  temperature 97.6  F (36.4  C), resp. rate 8, height 1.524 m (5'), weight 78 kg (172 lb), last menstrual period 04/25/2007, SpO2 97 %, not currently breastfeeding.  Exam:    GENERAL:  Comfortable.  PSYCH: pleasant, oriented, No acute distress.  HEENT:  PERRLA. Normal conjunctiva, normal hearing, nasal mucosa and Oropharynx are normal.  NECK:  Supple, no neck vein distention, adenopathy or bruits, normal thyroid.  HEART:  Normal S1, S2 with no murmur, no pericardial rub, S3 or S4.  LUNGS:  Clear to auscultation, normal respiratory effort.  ABDOMEN:  Soft, no hepatosplenomegaly, normal bowel sounds.  EXTREMITIES:  No pedal edema, +2 radial and posterior tibial pulses bilateral and equal. Bilateral knee dressing c/d/i with ACE wraps in place and drain on right side  SKIN:  Dry to touch, No rash, wound or ulcerations.  NEUROLOGIC:  Nonfocal with normal cranial nerve and motor power and sensation.          Data:   Past 24 hours labs, studies, and imaging were reviewed.    Results for orders placed or performed during the hospital encounter of 11/13/18   XR Knee Port Bilateral 1/2 Views    Narrative    XR KNEE PORT BILATERAL 1/2 VW 11/13/2018 12:05 PM    COMPARISON: None.    HISTORY: Arthroplasty.      Impression    IMPRESSION: Bilateral total knee arthroplasties. Hardware appears  intact on both sides. No fractures are seen on either side. Surgical  drain in place on the RIGHT.    MD Iliana CRAMER PA-C    Pt discussed with Dr. Benitez who agrees with the care as discussed above.

## 2018-11-13 NOTE — IP AVS SNAPSHOT
[facility]    [address] [place] [address]    Phone:  [phone]    Fax:  [phone]                                       After Visit Summary   11/13/2018    Delilah Miranda    MRN: [id number]           After Visit Summary Signature Page     I have received my discharge instructions, and my questions have been answered. I have discussed any challenges I see with this plan with the nurse or doctor.    ..........................................................................................................................................  Patient/Patient Representative Signature      ..........................................................................................................................................  Patient Representative Print Name and Relationship to Patient    ..................................................               ................................................  Date                                   Time    ..........................................................................................................................................  Reviewed by Signature/Title    ...................................................              ..............................................  Date                                               Time          22EPIC Rev 08/18

## 2018-11-13 NOTE — PROGRESS NOTES
11/13/18 1603   Quick Adds   Type of Visit Initial PT Evaluation   Living Environment   Lives With spouse   Living Arrangements house   Home Accessibility bed and bath are not on the first floor;stairs to enter home;stairs within home   Number of Stairs to Enter Home 2   Number of Stairs Within Home 20  (10 stairs to landing, another 10 to upper level)   Stair Railings at Home inside, present on right side   Transportation Available car;family or friend will provide   Living Environment Comment Patient lives with  in multi-level house who is able to provide assistance   Self-Care   Dominant Hand right   Usual Activity Tolerance good   Current Activity Tolerance moderate   Regular Exercise yes   Activity/Exercise Type walking   Equipment Currently Used at Home cane, straight;walker, rolling;grab bar   Activity/Exercise/Self-Care Comment Patient uses walker outside home for longer distances   Functional Level Prior   Ambulation 1-->assistive equipment   Transferring 1-->assistive equipment   Toileting 0-->independent   Bathing 0-->independent   Dressing 0-->independent   Eating 0-->independent   Communication 0-->understands/communicates without difficulty   Swallowing 0-->swallows foods/liquids without difficulty   Cognition 0 - no cognition issues reported   Fall history within last six months no   Which of the above functional risks had a recent onset or change? ambulation;transferring;toileting;bathing;dressing   Prior Functional Level Comment Patient independent with all functional mobility at baseline   General Information   Onset of Illness/Injury or Date of Surgery - Date 11/13/18   Referring Physician Bryon Zaragoza MD   Patient/Family Goals Statement discharge to TCU   Pertinent History of Current Problem (include personal factors and/or comorbidities that impact the POC) Patient is s/p B TKA, POD#1. Refer to EMR for pertinent PMH.   Precautions/Limitations fall precautions    Weight-Bearing Status - LLE weight-bearing as tolerated   Weight-Bearing Status - RLE weight-bearing as tolerated   Cognitive Status Examination   Orientation orientation to person, place and time   Level of Consciousness alert   Follows Commands and Answers Questions 100% of the time   Personal Safety and Judgment intact   Memory intact   Pain Assessment   Patient Currently in Pain Yes, see Vital Sign flowsheet   Integumentary/Edema   Integumentary/Edema Comments ACE bandages applied to B LE clean, dry, intact   Posture    Posture Forward head position;Protracted shoulders   Range of Motion (ROM)   ROM Comment B LE impaired secondary to surgery   Strength   Strength Comments Independent SLR on R LE, requires min/mod A on L LE     Bed Mobility   Bed Mobility Comments SBA with supine <> sit. Patient utilizes trapeze for transfer/repositioning.   Transfer Skills   Transfer Comments N/A at this time; no KI available. Assess at later date.   Gait   Gait Comments N/A at this time; no KI available. Assess at later date.   Balance   Balance Comments N/A at this time; no KI available. Assess at later date.   Sensory Examination   Sensory Perception no deficits were identified   Coordination   Coordination no deficits were identified   Muscle Tone   Muscle Tone no deficits were identified   Modality Interventions   Planned Modality Interventions Cryotherapy   Planned Modality Interventions Comments Ice PRN   General Therapy Interventions   Planned Therapy Interventions bed mobility training;strengthening;transfer training;gait training;home program guidelines;progressive activity/exercise   Clinical Impression   Criteria for Skilled Therapeutic Intervention yes, treatment indicated   PT Diagnosis decreased independence with functional mobility    Influenced by the following impairments pain, weakness, decreased ROM, balance deficits    Functional limitations due to impairments ambulation, stair negotiation, functional  "transfers    Clinical Presentation Stable/Uncomplicated   Clinical Presentation Rationale Patient presenting as expected s/p B TKA    Clinical Decision Making (Complexity) Low complexity   Therapy Frequency` 2 times/day   Predicted Duration of Therapy Intervention (days/wks) 3 days   Anticipated Discharge Disposition Transitional Care Facility   Risk & Benefits of therapy have been explained Yes   Patient, Family & other staff in agreement with plan of care Yes   [facility] TM \"6 Clicks\"   2016, Trustees of [facility], under license to [employer].  All rights reserved.   6 Clicks Short Forms Basic Mobility Inpatient Short Form   [facility] AM-PAC  \"6 Clicks\" V.2 Basic Mobility Inpatient Short Form   1. Turning from your back to your side while in a flat bed without using bedrails? 4 - None   2. Moving from lying on your back to sitting on the side of a flat bed without using bedrails? 4 - None   3. Moving to and from a bed to a chair (including a wheelchair)? 3 - A Little   4. Standing up from a chair using your arms (e.g., wheelchair, or bedside chair)? 3 - A Little   5. To walk in hospital room? 2 - A Lot   6. Climbing 3-5 steps with a railing? 2 - A Lot   Basic Mobility Raw Score (Score out of 24.Lower scores equate to lower levels of function) 18   Total Evaluation Time   Total Evaluation Time (Minutes) 10     "

## 2018-11-13 NOTE — PLAN OF CARE
Problem: Knee Arthroplasty (Total, Partial) (Adult)  Goal: Signs and Symptoms of Listed Potential Problems Will be Absent, Minimized or Managed (Knee Arthroplasty)  Signs and symptoms of listed potential problems will be absent, minimized or managed by discharge/transition of care (reference Knee Arthroplasty (Total, Partial) (Adult) CPG).   Outcome: Improving  Pt arrived from pacu in her bed, IV infusing into left forearm, weber and 1 hemavac in right leg but none in left. Ace wraps clean and dry to both legs. CMS intact. O2 on at 3L NC keeping sats in mid to high 90's. CPAP used in pacu and continued up on the floor with the O2 bled into it. Denied pain on arrival but stated she has pressure in the legs. PCDs on and ice bags to the knees.  was here but left for home before she came up. Pt does want to go to TCU on discharge since she had both knees done.

## 2018-11-13 NOTE — PLAN OF CARE
Problem: Patient Care Overview  Goal: Plan of Care/Patient Progress Review  PT: PT orders received, evaluation completed, treatment initiated. Patient is a 57 y/o female s/p B TKA, POD#0. Patient lives with  in multi-level house with 2 stairs to enter and a total of 20 stairs to negotiate within home in order to access bedroom/bathroom. Stairs to enter home have no railings; stairs within home have 1 railing. Patient reports that if needed, she can initially stay on main level of home.  Patient reports using 4WW at baseline when ambulating longer distances outside home. Patient plans on discharging to TCU after IP stay.     Discharge Planner PT   Patient plan for discharge: TCU  Current status: PT: Patient supine upon arrival. Patient educated on PT role and POC; agreeable to session. Patient participates in LE TE. Patient completes supine <> sit transfers with SBA and use of trapeze for transfer/repositioning. Patient demonstrates good static sitting balance. Blood pressure at /88; patient with no c/o lightheadedness. Blood pressure in supine after transfer 169/93 with no c/o lightheadedness. Patient supine at end of session with all needs in reach.   Barriers to return to prior living situation: stairs - will address in PT   Recommendations for discharge: TCU   Rationale for recommendations: Patient presenting below baseline and has significant obstacles (stairs) at home. Patient reports wanting to discharge to TCU after hospital stay. Anticipate that patient will benefit from further skilled therapy in TCU setting to increase strength, endurance, and independence with functional mobility.        Entered by: Tangela Haley 11/13/2018 5:37 PM

## 2018-11-14 ENCOUNTER — APPOINTMENT (OUTPATIENT)
Dept: PHYSICAL THERAPY | Facility: CLINIC | Age: 58
DRG: 462 | End: 2018-11-14
Attending: ORTHOPAEDIC SURGERY
Payer: COMMERCIAL

## 2018-11-14 ENCOUNTER — TELEPHONE (OUTPATIENT)
Dept: FAMILY MEDICINE | Facility: CLINIC | Age: 58
End: 2018-11-14

## 2018-11-14 LAB
CREAT SERPL-MCNC: 0.65 MG/DL (ref 0.52–1.04)
GFR SERPL CREATININE-BSD FRML MDRD: >90 ML/MIN/1.7M2
GLUCOSE SERPL-MCNC: 144 MG/DL (ref 70–99)
HGB BLD-MCNC: 8.9 G/DL (ref 11.7–15.7)
PLATELET # BLD AUTO: 219 10E9/L (ref 150–450)

## 2018-11-14 PROCEDURE — 25000132 ZZH RX MED GY IP 250 OP 250 PS 637: Performed by: ORTHOPAEDIC SURGERY

## 2018-11-14 PROCEDURE — 85018 HEMOGLOBIN: CPT | Performed by: ORTHOPAEDIC SURGERY

## 2018-11-14 PROCEDURE — 82947 ASSAY GLUCOSE BLOOD QUANT: CPT | Performed by: ORTHOPAEDIC SURGERY

## 2018-11-14 PROCEDURE — 12000000 ZZH R&B MED SURG/OB

## 2018-11-14 PROCEDURE — 25000131 ZZH RX MED GY IP 250 OP 636 PS 637: Performed by: ORTHOPAEDIC SURGERY

## 2018-11-14 PROCEDURE — 25000128 H RX IP 250 OP 636: Performed by: ORTHOPAEDIC SURGERY

## 2018-11-14 PROCEDURE — 97530 THERAPEUTIC ACTIVITIES: CPT | Mod: GP | Performed by: PHYSICAL THERAPIST

## 2018-11-14 PROCEDURE — 85049 AUTOMATED PLATELET COUNT: CPT | Performed by: ORTHOPAEDIC SURGERY

## 2018-11-14 PROCEDURE — 82565 ASSAY OF CREATININE: CPT | Performed by: ORTHOPAEDIC SURGERY

## 2018-11-14 PROCEDURE — 97116 GAIT TRAINING THERAPY: CPT | Mod: GP | Performed by: PHYSICAL THERAPIST

## 2018-11-14 PROCEDURE — 99232 SBSQ HOSP IP/OBS MODERATE 35: CPT | Performed by: INTERNAL MEDICINE

## 2018-11-14 PROCEDURE — 36415 COLL VENOUS BLD VENIPUNCTURE: CPT | Performed by: ORTHOPAEDIC SURGERY

## 2018-11-14 PROCEDURE — 97110 THERAPEUTIC EXERCISES: CPT | Mod: GP | Performed by: PHYSICAL THERAPIST

## 2018-11-14 PROCEDURE — 40000193 ZZH STATISTIC PT WARD VISIT: Performed by: PHYSICAL THERAPIST

## 2018-11-14 RX ORDER — POLYETHYLENE GLYCOL 3350 17 G/17G
17 POWDER, FOR SOLUTION ORAL 2 TIMES DAILY PRN
Status: DISCONTINUED | OUTPATIENT
Start: 2018-11-14 | End: 2018-11-16 | Stop reason: HOSPADM

## 2018-11-14 RX ADMIN — ONDANSETRON 4 MG: 2 INJECTION INTRAMUSCULAR; INTRAVENOUS at 08:00

## 2018-11-14 RX ADMIN — OXYCODONE HYDROCHLORIDE 10 MG: 5 TABLET ORAL at 04:28

## 2018-11-14 RX ADMIN — AMITRIPTYLINE HYDROCHLORIDE 25 MG: 25 TABLET, FILM COATED ORAL at 21:04

## 2018-11-14 RX ADMIN — ONDANSETRON 4 MG: 4 TABLET, ORALLY DISINTEGRATING ORAL at 21:01

## 2018-11-14 RX ADMIN — ONDANSETRON 4 MG: 2 INJECTION INTRAMUSCULAR; INTRAVENOUS at 01:13

## 2018-11-14 RX ADMIN — ACETAMINOPHEN 975 MG: 325 TABLET, FILM COATED ORAL at 21:04

## 2018-11-14 RX ADMIN — HYDROMORPHONE HYDROCHLORIDE 0.5 MG: 1 INJECTION, SOLUTION INTRAMUSCULAR; INTRAVENOUS; SUBCUTANEOUS at 00:43

## 2018-11-14 RX ADMIN — OXYCODONE HYDROCHLORIDE 10 MG: 5 TABLET ORAL at 08:10

## 2018-11-14 RX ADMIN — DOCUSATE SODIUM 100 MG: 100 CAPSULE, LIQUID FILLED ORAL at 21:04

## 2018-11-14 RX ADMIN — HYDROMORPHONE HYDROCHLORIDE 0.5 MG: 1 INJECTION, SOLUTION INTRAMUSCULAR; INTRAVENOUS; SUBCUTANEOUS at 12:40

## 2018-11-14 RX ADMIN — HYDROXYZINE HYDROCHLORIDE 25 MG: 25 TABLET ORAL at 11:52

## 2018-11-14 RX ADMIN — MAGNESIUM OXIDE TAB 400 MG (241.3 MG ELEMENTAL MG) 400 MG: 400 (241.3 MG) TAB at 21:04

## 2018-11-14 RX ADMIN — ACETAMINOPHEN 975 MG: 325 TABLET, FILM COATED ORAL at 05:54

## 2018-11-14 RX ADMIN — SENNOSIDES AND DOCUSATE SODIUM 1 TABLET: 8.6; 5 TABLET ORAL at 21:04

## 2018-11-14 RX ADMIN — CEFAZOLIN SODIUM 1 G: 1 INJECTION, SOLUTION INTRAVENOUS at 03:04

## 2018-11-14 RX ADMIN — OXYCODONE HYDROCHLORIDE 10 MG: 5 TABLET ORAL at 11:52

## 2018-11-14 RX ADMIN — RANITIDINE 150 MG: 150 TABLET ORAL at 08:01

## 2018-11-14 RX ADMIN — OXYCODONE HYDROCHLORIDE 10 MG: 5 TABLET ORAL at 18:07

## 2018-11-14 RX ADMIN — OXYCODONE HYDROCHLORIDE 10 MG: 5 TABLET ORAL at 14:41

## 2018-11-14 RX ADMIN — OXYCODONE HYDROCHLORIDE 10 MG: 5 TABLET ORAL at 01:22

## 2018-11-14 RX ADMIN — CELECOXIB 200 MG: 200 CAPSULE ORAL at 08:02

## 2018-11-14 RX ADMIN — ATORVASTATIN CALCIUM 40 MG: 40 TABLET, FILM COATED ORAL at 21:04

## 2018-11-14 RX ADMIN — HYDROXYZINE HYDROCHLORIDE 50 MG: 25 TABLET ORAL at 18:07

## 2018-11-14 RX ADMIN — OXYCODONE HYDROCHLORIDE 10 MG: 5 TABLET ORAL at 21:29

## 2018-11-14 RX ADMIN — CELECOXIB 200 MG: 200 CAPSULE ORAL at 21:04

## 2018-11-14 RX ADMIN — HYDROMORPHONE HYDROCHLORIDE 0.5 MG: 1 INJECTION, SOLUTION INTRAMUSCULAR; INTRAVENOUS; SUBCUTANEOUS at 10:47

## 2018-11-14 RX ADMIN — FERROUS SULFATE TAB 325 MG (65 MG ELEMENTAL FE) 325 MG: 325 (65 FE) TAB at 08:01

## 2018-11-14 RX ADMIN — SERTRALINE HYDROCHLORIDE 150 MG: 50 TABLET ORAL at 08:01

## 2018-11-14 RX ADMIN — RANITIDINE 150 MG: 150 TABLET ORAL at 21:04

## 2018-11-14 RX ADMIN — ACETAMINOPHEN 975 MG: 325 TABLET, FILM COATED ORAL at 14:24

## 2018-11-14 RX ADMIN — DOCUSATE SODIUM 100 MG: 100 CAPSULE, LIQUID FILLED ORAL at 08:01

## 2018-11-14 RX ADMIN — FERROUS SULFATE TAB 325 MG (65 MG ELEMENTAL FE) 325 MG: 325 (65 FE) TAB at 18:07

## 2018-11-14 RX ADMIN — SENNOSIDES AND DOCUSATE SODIUM 1 TABLET: 8.6; 5 TABLET ORAL at 08:01

## 2018-11-14 RX ADMIN — TOLTERODINE TARTRATE 2 MG: 2 CAPSULE, EXTENDED RELEASE ORAL at 08:01

## 2018-11-14 RX ADMIN — ENOXAPARIN SODIUM 40 MG: 40 INJECTION SUBCUTANEOUS at 08:05

## 2018-11-14 RX ADMIN — HYDROMORPHONE HYDROCHLORIDE 0.5 MG: 1 INJECTION, SOLUTION INTRAMUSCULAR; INTRAVENOUS; SUBCUTANEOUS at 20:32

## 2018-11-14 ASSESSMENT — ACTIVITIES OF DAILY LIVING (ADL)
ADLS_ACUITY_SCORE: 12

## 2018-11-14 ASSESSMENT — PAIN DESCRIPTION - DESCRIPTORS
DESCRIPTORS: ACHING
DESCRIPTORS: ACHING

## 2018-11-14 NOTE — PLAN OF CARE
Problem: Patient Care Overview  Goal: Plan of Care/Patient Progress Review  VSS. Oxycodone and dilaudid given for pain control. Uses CPAP 4L NC. Lungs clear diminished. Tolerates full liquid diet. Zofran given for nausea. CMS intact. Dressing CDI. R knee Hemovac. Huerta with adequate urine output. Sat at the edge of the bed. Calls appropriately. Bed alarm on. Continue with POC.

## 2018-11-14 NOTE — TELEPHONE ENCOUNTER
This would need to be taken care of by orthopedics and or hospital, pt just had bilateral total knee replacements.    Roxana Cuadra PA-C

## 2018-11-14 NOTE — PROGRESS NOTES
.Discharge Planner   Discharge Plans in progress: TCU  Barriers to discharge plan: facility availability  Follow up plan: await assessments, continue planning accordingly       Entered by: ZARIA Meng 11/14/2018 1:57 PM

## 2018-11-14 NOTE — TELEPHONE ENCOUNTER
Reason for Call:  Other Referral     Detailed comments: Ailyn from [facility] called and stated that patient was referred to them. They are getting a denial stating that PCP needs to send a referral and contact patients [employer] and get a confirmation code. The code needs to be given to [facility] to be able to admit patient     If any questions you can call Ailyn at [phone]    Call taken on 11/14/2018 at 2:53 PM by Loretta Morris

## 2018-11-14 NOTE — PROGRESS NOTES
Orthopedic Surgery  11/14/2018  POD#: 1    S: Patient voices no complaints today.     O: Blood pressure 113/55, pulse 90, temperature 97.9  F (36.6  C), temperature source Oral, resp. rate 18, height 1.524 m (5'), weight 78 kg (172 lb), last menstrual period 04/25/2007, SpO2 98 %, not currently breastfeeding.  Lab Results   Component Value Date    HGB 12.3 11/06/2018     No results found for: INR     I/O last 3 completed shifts:  In: 4196 [P.O.:700; I.V.:3496]  Out: 2811 [Urine:2450; Drains:350; Blood:11]    Neurovascularly intact.  Calves are negative bilaterally, both soft and nontender.  The wound is C/D/I.  The wound looks good with minimal erythema of the surrounding skin.    A: Ms. Miranda is doing well status post Procedure(s):  bilateral total knee arthroplasty.    P: Hemovac to be pulled this morning  1. Mobilize and continue physical therapy.   2. Anticoagulation - discharge on ASA  3. Pain management - controlled  4. Anticipate discharge to TCU in 2 days      Darlyn Justin PA-C  O: [phone]

## 2018-11-14 NOTE — PLAN OF CARE
Problem: Knee Arthroplasty (Total, Partial) (Adult)  Goal: Signs and Symptoms of Listed Potential Problems Will be Absent, Minimized or Managed (Knee Arthroplasty)  Signs and symptoms of listed potential problems will be absent, minimized or managed by discharge/transition of care (reference Knee Arthroplasty (Total, Partial) (Adult) CPG).   Outcome: Improving  Afeb, vss, cms intact, ace wraps clean and dry, drain pulled from right knee but surgeon. Bradley coming out this pm since pt was able to ambulate in room and sit in chair for an hour. Pain mod to severe after therapy requiring IV dilaudid as well as po analgesics. SWS saw pt and will send referrals for TCUs for Friday discharge.

## 2018-11-14 NOTE — PLAN OF CARE
Problem: Patient Care Overview  Goal: Plan of Care/Patient Progress Review      Discharge Planner PT   Patient plan for discharge: TCU  Current status: Pt completes sit<>supine with SBA and cues for technique, increased time. Pt completes sit<>stand with Brittney and cues for safe technique. Pt able to complete pre-gait activities at EOB with CGAx2 and FWW. Pt ambulates ~6' with use of FWW and CGAx2 with cues for walker management. Pt tolerates well. Pt does not require KI for mobility.   Barriers to return to prior living situation: stairs, pain, decreased activity tolerance  Recommendations for discharge: TCU   Rationale for recommendations: Patient presenting below baseline and has significant obstacles (stairs) at home. Patient reports wanting to discharge to TCU after hospital stay. Anticipate that patient will benefit from further skilled therapy in TCU setting to increase strength, endurance, and independence with functional mobility.        Entered by: Cecille Martinez 11/14/2018 11:21 AM

## 2018-11-14 NOTE — CONSULTS
.Care Transition Initial Assessment -   Reason For Consult: discharge planning. Chart review noting per ortho PA the anticipation of pt's transfer to rehab facility on Friday, noted also PT assessment with recommendation for TCU on discharge.  Met with: Patient    Active Problems:    S/P total knee arthroplasty       DATA  Lives With: spouse  Living Arrangements: house  Description of Support System: Supportive, Involved  Who is your support system?:   Support Assessment: Adequate family and caregiver support.       Resources List: Skilled Nursing Facility     Quality Of Family Relationships: supportive, involved  Transportation Available:  (to be determined)    INTERVENTION   Met with pt who affirms planning for her transfer to rehab facility on discharge identifying [facility] as her facility of preference, she does have her name on list there. Based on discussion, additional facilities she would consider would be [facility], [facility], and [facility].. Pt requested a private room, discussed private room fees at facilities noted which she acknowledged and accepted. Referrals made, additional questions were addressed. Pt is uncertain at this time whether she would have her  provide transport to rehab facility or medical transport, discussed with her that w/c transport would likely not be covered by her insurance, private pay cost would be $70/base and $4.50/mi which she acknowledged.     ASSESSMENT  Cognitive Status:  alert and oriented     PLAN  Financial costs for the patient includes: private room  Patient given options and choices for discharge: Yes  Patient/family is agreeable to the plan?  Yes:  Patient Goals and Preferences: independence with ambulation and ADLs  Patient anticipates discharging to: rehab facility     Will await assessment and continue planning in anticipation of discharge on Friday.

## 2018-11-14 NOTE — PLAN OF CARE
Problem: Patient Care Overview  Goal: Plan of Care/Patient Progress Review  Discharge Planner OT   Patient plan for discharge: TCU per chart   Current status: OT orders received. Reviewed chart and discussed with treatment team including care coordinator and physical therapist. Per chart, patient, and discussion, pt will discharge to TCU. Appropriate to defer OT to next level of care and cancel IP OT orders/evaluation.   Barriers to return to prior living situation: current level of assist   Recommendations for discharge: Will defer OT to next level of care and cancel IP OT orders/evaluation.   Rationale for recommendations: Pt will benefit from OT in next level of care, TCU, to address independence in ADLs.        Entered by: Coty Dwyer 11/14/2018 2:45 PM

## 2018-11-14 NOTE — PROGRESS NOTES
[facility]  Hospitalist Progress Note  Jacoby Guido MD 11/14/2018        Reason for hospital stay: bilateral TKA         Brief Summary of Stay       Delilah is a 58 year old female with a PMH significant for SHIRA on CPAP, anxiety, depression, HLD, former tobacco abuse, and mild intermittent asthma who is s/p bilateral TKA.     Assessment and Plan          #1.POD#1- doing well     #2.Postop pain:controlled     #3.Postop hypoxia: resolved     #4.Postop acute blood loss anemia: asymptomatic          Hemoglobin   Date Value Ref Range Status   11/14/2018 8.9 (L) 11.7 - 15.7 g/dL Final   ]    #5.Acute and Chronic medical conditions.         SHIRA: continue nightly CPAP use     Anxiety, depression, PTSD: continue Amitriptyline, Sertraline, and PRN Atarax     HLD: continue Atorvastatin  Constipation - Miralax ordered            DVT Prophylaxis: Defer to primary service    Code Status: Full Code    Discharge Dispo: TCU     Estimated Disch Date / # of Days until Disch: Per ortho            Interval History (Subjective):        Patient is seen and examined by me today and medical record reviewed.Overnight events noted and care discussed with nursing staff.    doing well; no cp, sob, n/v/d, or abd pain.                            Physical Exam:        Last Vital Signs:  /88  Pulse 90  Temp 98.3  F (36.8  C)  Resp 18  Ht 1.524 m (5')  Wt 78 kg (172 lb)  LMP 04/25/2007  SpO2 95%  BMI 33.59 kg/m2    I/O last 3 completed shifts:  In: 4196 [P.O.:700; I.V.:3496]  Out: 2811 [Urine:2450; Drains:350; Blood:11]  Wt Readings from Last 1 Encounters:   11/13/18 78 kg (172 lb)     Vitals:    11/09/18 1000 11/13/18 0609   Weight: 78.9 kg (174 lb) 78 kg (172 lb)                Physical Exam:   Blood pressure 133/88, pulse 90, temperature 98.3  F (36.8  C), resp. rate 18, height 1.524 m (5'), weight 78 kg (172 lb), last menstrual period 04/25/2007, SpO2 95 %, not currently breastfeeding.    Constitutional:    awake and alert     Neck:   supple, symmetrical, trachea midline, skin normal and no stridor     Lungs:   no increased work of breathing, good air exchange, no retractions and clear to auscultation     Cardiovascular:   normal apical pulses  and normal S1 and S2     Abdomen:   normal bowel sounds and soft     Neurologic:   Mental Status Exam:  Level of Alertness:   awake  Cranial Nerves:  cranial nerves II-XII are grossly intact  Motor Exam:  moves all extremities well and symmetrically            Medications:      All current medications were reviewed with changes reflected in problem list.    Current Facility-Administered Medications   Medication     [START ON 11/16/2018] acetaminophen (TYLENOL) tablet 650 mg     acetaminophen (TYLENOL) tablet 975 mg     albuterol (PROAIR HFA/PROVENTIL HFA/VENTOLIN HFA) 108 (90 Base) MCG/ACT inhaler 2 puff     alpha-lipoic acid CAPS 1 capsule     amitriptyline (ELAVIL) tablet 25 mg     atorvastatin (LIPITOR) tablet 40 mg     benzocaine-menthol (CHLORASEPTIC) 6-10 MG lozenge 1-2 lozenge     Biotin TABS 1 tablet     celecoxib (celeBREX) capsule 200 mg     docusate sodium (COLACE) capsule 100 mg     enoxaparin (LOVENOX) injection 40 mg     ferrous sulfate (IRON) tablet 325 mg     fluticasone (FLONASE) 50 MCG/ACT spray 1 spray     HYDROmorphone (PF) (DILAUDID) injection 0.3-0.5 mg     hydrOXYzine (ATARAX) tablet 25-50 mg     lidocaine (LMX4) kit     lidocaine 1 % 1 mL     magnesium oxide (MAG-OX) tablet 400 mg     methocarbamol (ROBAXIN) tablet 1,000 mg     naloxone (NARCAN) injection 0.1-0.4 mg     ondansetron (ZOFRAN-ODT) ODT tab 4 mg    Or     ondansetron (ZOFRAN) injection 4 mg     oxyCODONE IR (ROXICODONE) tablet 5-10 mg     polyethylene glycol (MIRALAX/GLYCOLAX) Packet 17 g     prochlorperazine (COMPAZINE) injection 10 mg    Or     prochlorperazine (COMPAZINE) tablet 10 mg     ranitidine (ZANTAC) tablet 150 mg     senna-docusate (SENOKOT-S;PERICOLACE) 8.6-50 MG per tablet 1  tablet     sertraline (ZOLOFT) tablet 150 mg     sodium chloride (PF) 0.9% PF flush 3 mL     sodium chloride (PF) 0.9% PF flush 3 mL     sodium chloride 0.9% infusion     tolterodine (DETROL LA) 24 hr capsule 2 mg     zolpidem (AMBIEN) tablet 5 mg            Data:         Labs:  All laboratory and imaging data in the past 24 hours reviewed   All laboratory and imaging data in the past 24 hours reviewed       Recent Labs  Lab 11/14/18  0725   HGB 8.9*        No results for input(s): CULT in the last 168 hours.    Recent Labs  Lab 11/14/18  0725   *   CR 0.65   GFRESTIMATED >90   GFRESTBLACK >90         Recent Labs  Lab 11/14/18  0725   *           No results for input(s): INR in the last 168 hours.        No results for input(s): TROPONIN, TROPI, TROPR in the last 168 hours.    Invalid input(s): TROP, TROPONINIES    Recent Results (from the past 48 hour(s))   XR Knee Port Bilateral 1/2 Views    Narrative    XR KNEE PORT BILATERAL 1/2 VW 11/13/2018 12:05 PM    COMPARISON: None.    HISTORY: Arthroplasty.      Impression    IMPRESSION: Bilateral total knee arthroplasties. Hardware appears  intact on both sides. No fractures are seen on either side. Surgical  drain in place on the RIGHT.    AURELIO AVILES MD               Imaging:   No results found for this or any previous visit (from the past 24 hour(s)).                  Disclaimer: This note consists of symbols derived from keyboarding, dictation and/or voice recognition software. As a result, there may be errors in the script that have gone undetected. Please consider this when interpreting information found in this chart

## 2018-11-14 NOTE — PLAN OF CARE
Problem: Patient Care Overview  Goal: Plan of Care/Patient Progress Review  Outcome: Improving  Pt has not gotten up with staff but did sit up at edge of bed on evenings and tolerated well. Pain partially controlled with oxycodone, tylenol, and dilaudid IV for breakthrough. LS clear - encouraged IS and demonstrated appropriately. CADB exercises also encouraged. BS present, passing flatus, LBM 11/12/18. CMS intact with no numbness/tingling. Drsgs to both knees are CDI. R knee hemovac in place to suction. Huerta in place with adequate output. Doing well.   Update - Hemovac removed by Dr. Zaragoza.

## 2018-11-14 NOTE — PLAN OF CARE
Problem: Patient Care Overview  Goal: Plan of Care/Patient Progress Review    Discharge Planner PT   Patient plan for discharge: TCU  Current status: Pt participates in LE TE. Pt completes sit<>supine with SBA. Pt completes sit<>stand with FWW and min A/CGA and cues for safe technique.  Pt ambulates 60' with use of FWW and CGA. Patient supine at end of session with all needs in reach.   Barriers to return to prior living situation: stairs, pain, decreased activity tolerance  Recommendations for discharge: TCU   Rationale for recommendations: Patient presenting below baseline and has significant obstacles (stairs) at home. Patient reports wanting to discharge to TCU after hospital stay. Anticipate that patient will benefit from further skilled therapy in TCU setting to increase strength, endurance, and independence with functional mobility.        Entered by: Tangela Haley 11/14/2018 5:16 PM

## 2018-11-14 NOTE — TELEPHONE ENCOUNTER
Left message for Ailyn to call back.  We do not see referral from Roxana to [facility].  Please call us back.    DId surgeon or hospitalist refer?  Roxana, are you able to unravel this?     Rafita Barnes RN

## 2018-11-15 ENCOUNTER — APPOINTMENT (OUTPATIENT)
Dept: PHYSICAL THERAPY | Facility: CLINIC | Age: 58
DRG: 462 | End: 2018-11-15
Attending: ORTHOPAEDIC SURGERY
Payer: COMMERCIAL

## 2018-11-15 LAB
GLUCOSE SERPL-MCNC: 133 MG/DL (ref 70–99)
HGB BLD-MCNC: 8 G/DL (ref 11.7–15.7)

## 2018-11-15 PROCEDURE — 12000000 ZZH R&B MED SURG/OB

## 2018-11-15 PROCEDURE — 25000131 ZZH RX MED GY IP 250 OP 636 PS 637: Performed by: ORTHOPAEDIC SURGERY

## 2018-11-15 PROCEDURE — 97110 THERAPEUTIC EXERCISES: CPT | Mod: GP | Performed by: PHYSICAL THERAPIST

## 2018-11-15 PROCEDURE — 25000128 H RX IP 250 OP 636: Performed by: ORTHOPAEDIC SURGERY

## 2018-11-15 PROCEDURE — 25000132 ZZH RX MED GY IP 250 OP 250 PS 637: Performed by: INTERNAL MEDICINE

## 2018-11-15 PROCEDURE — 82947 ASSAY GLUCOSE BLOOD QUANT: CPT | Performed by: ORTHOPAEDIC SURGERY

## 2018-11-15 PROCEDURE — 97530 THERAPEUTIC ACTIVITIES: CPT | Mod: GP | Performed by: PHYSICAL THERAPIST

## 2018-11-15 PROCEDURE — 40000193 ZZH STATISTIC PT WARD VISIT: Performed by: PHYSICAL THERAPIST

## 2018-11-15 PROCEDURE — 36415 COLL VENOUS BLD VENIPUNCTURE: CPT | Performed by: ORTHOPAEDIC SURGERY

## 2018-11-15 PROCEDURE — 99232 SBSQ HOSP IP/OBS MODERATE 35: CPT | Performed by: INTERNAL MEDICINE

## 2018-11-15 PROCEDURE — 97116 GAIT TRAINING THERAPY: CPT | Mod: GP | Performed by: PHYSICAL THERAPIST

## 2018-11-15 PROCEDURE — 85018 HEMOGLOBIN: CPT | Performed by: ORTHOPAEDIC SURGERY

## 2018-11-15 PROCEDURE — 25000132 ZZH RX MED GY IP 250 OP 250 PS 637: Performed by: ORTHOPAEDIC SURGERY

## 2018-11-15 RX ADMIN — OXYCODONE HYDROCHLORIDE 10 MG: 5 TABLET ORAL at 09:44

## 2018-11-15 RX ADMIN — OXYCODONE HYDROCHLORIDE 10 MG: 5 TABLET ORAL at 06:41

## 2018-11-15 RX ADMIN — RANITIDINE 150 MG: 150 TABLET ORAL at 08:08

## 2018-11-15 RX ADMIN — MAGNESIUM OXIDE TAB 400 MG (241.3 MG ELEMENTAL MG) 400 MG: 400 (241.3 MG) TAB at 20:09

## 2018-11-15 RX ADMIN — CELECOXIB 200 MG: 200 CAPSULE ORAL at 08:08

## 2018-11-15 RX ADMIN — FERROUS SULFATE TAB 325 MG (65 MG ELEMENTAL FE) 325 MG: 325 (65 FE) TAB at 17:37

## 2018-11-15 RX ADMIN — OXYCODONE HYDROCHLORIDE 10 MG: 5 TABLET ORAL at 00:30

## 2018-11-15 RX ADMIN — ACETAMINOPHEN 975 MG: 325 TABLET, FILM COATED ORAL at 21:00

## 2018-11-15 RX ADMIN — DOCUSATE SODIUM 100 MG: 100 CAPSULE, LIQUID FILLED ORAL at 08:08

## 2018-11-15 RX ADMIN — TOLTERODINE TARTRATE 2 MG: 2 CAPSULE, EXTENDED RELEASE ORAL at 08:09

## 2018-11-15 RX ADMIN — ATORVASTATIN CALCIUM 40 MG: 40 TABLET, FILM COATED ORAL at 20:09

## 2018-11-15 RX ADMIN — OXYCODONE HYDROCHLORIDE 10 MG: 5 TABLET ORAL at 20:10

## 2018-11-15 RX ADMIN — ACETAMINOPHEN 975 MG: 325 TABLET, FILM COATED ORAL at 13:40

## 2018-11-15 RX ADMIN — ENOXAPARIN SODIUM 40 MG: 40 INJECTION SUBCUTANEOUS at 08:12

## 2018-11-15 RX ADMIN — CELECOXIB 200 MG: 200 CAPSULE ORAL at 20:10

## 2018-11-15 RX ADMIN — SENNOSIDES AND DOCUSATE SODIUM 1 TABLET: 8.6; 5 TABLET ORAL at 20:10

## 2018-11-15 RX ADMIN — POLYETHYLENE GLYCOL 3350 17 G: 17 POWDER, FOR SOLUTION ORAL at 08:10

## 2018-11-15 RX ADMIN — OXYCODONE HYDROCHLORIDE 10 MG: 5 TABLET ORAL at 16:36

## 2018-11-15 RX ADMIN — SENNOSIDES AND DOCUSATE SODIUM 1 TABLET: 8.6; 5 TABLET ORAL at 08:09

## 2018-11-15 RX ADMIN — OXYCODONE HYDROCHLORIDE 10 MG: 5 TABLET ORAL at 03:39

## 2018-11-15 RX ADMIN — ACETAMINOPHEN 975 MG: 325 TABLET, FILM COATED ORAL at 06:41

## 2018-11-15 RX ADMIN — DOCUSATE SODIUM 100 MG: 100 CAPSULE, LIQUID FILLED ORAL at 20:09

## 2018-11-15 RX ADMIN — OXYCODONE HYDROCHLORIDE 10 MG: 5 TABLET ORAL at 12:38

## 2018-11-15 RX ADMIN — SERTRALINE HYDROCHLORIDE 150 MG: 50 TABLET ORAL at 08:09

## 2018-11-15 RX ADMIN — FERROUS SULFATE TAB 325 MG (65 MG ELEMENTAL FE) 325 MG: 325 (65 FE) TAB at 08:08

## 2018-11-15 RX ADMIN — RANITIDINE 150 MG: 150 TABLET ORAL at 20:09

## 2018-11-15 RX ADMIN — AMITRIPTYLINE HYDROCHLORIDE 25 MG: 25 TABLET, FILM COATED ORAL at 21:00

## 2018-11-15 RX ADMIN — ONDANSETRON 4 MG: 4 TABLET, ORALLY DISINTEGRATING ORAL at 15:03

## 2018-11-15 ASSESSMENT — ACTIVITIES OF DAILY LIVING (ADL)
ADLS_ACUITY_SCORE: 12

## 2018-11-15 NOTE — PROGRESS NOTES
Orthopedic Surgery  11/15/2018  POD#: 2    S: Patient voices no complaints today. Denies calf pain, dizziness, SOB.    O: Blood pressure 99/54, pulse 90, temperature 98.3  F (36.8  C), resp. rate 18, height 1.524 m (5'), weight 78 kg (172 lb), last menstrual period 04/25/2007, SpO2 94 %, not currently breastfeeding.  Lab Results   Component Value Date    HGB 8.0 11/15/2018     No results found for: INR     I/O last 3 completed shifts:  In: 850 [P.O.:850]  Out: 4100 [Urine:4100]    Neurovascularly intact.  Calves are negative bilaterally, both soft and nontender.  The wound is C/D/I.  The wound looks good with minimal erythema of the surrounding skin.    A: Ms. Miranda is doing well status post Procedure(s):  Bilateral total knee arthroplasty using Arthrex iBalance knee system.    P:   1. Mobilize and continue physical therapy.   2. Anticoagulation - discharge on ASA  3. Post-op anemia - Hgb 8.0 today; Stable; on PO iron. Will f/u with PCP fo rHgb recheck in 3- 4 weeks.  4. Pain management - controlled  5. Anticipate discharge to [facility] tomorrow.      Darlyn Justin PA-C  O: [phone]

## 2018-11-15 NOTE — PLAN OF CARE
Problem: Patient Care Overview  Goal: Plan of Care/Patient Progress Review    [place]: A&O, CMS intact  VS: /61 (BP Location: Right arm)  Pulse 90  Temp 98.4  F (36.9  C) (Oral)  Resp 18  Ht 1.524 m (5')  Wt 78 kg (172 lb)  LMP 04/25/2007  SpO2 93%  BMI 33.59 kg/m2  LS: clear on RA  GI: active, last BM 11/12, +flatus  : commode in BR  Skin: bilat knee incision, CDI  Activity: x2, walker, GB   Diet: reg   Pain: c/o 7-8/10, taking oxy PRN x2, dilaudid x1  Plan: PT, discharge to TCU

## 2018-11-15 NOTE — PROGRESS NOTES
Your information has been submitted on November 15th, 2018 at 04:15:11 PM [facility]. The confirmation number is [id number]

## 2018-11-15 NOTE — PROGRESS NOTES
SWS     D: Discharge planning continuing... [facility] has will be able to accept pt tomorrow, private room as requested, $38.40/ private room cost.      I: Met with pt and discussed above, she has asked that planning continue for her transfer to [facility], accepting the private room fee as noted. Pt will have her  provide transportation for her tomorrow @ 1300.     A/P: Anticipate no problem with arrangements made for transfer tomorrow, SW available until discharge should adjustments be needed in plan as noted.

## 2018-11-15 NOTE — PLAN OF CARE
Problem: Patient Care Overview  Goal: Plan of Care/Patient Progress Review  Outcome: Improving  Pt is A&Ox4, tachycardic Uses Cpap overnight, Assist of one with walker and gait, pain controlled with prn Oxycodone,dilaudid and schedule tylenol. Tolerating regular diet.. Lungs clear. CMS intact. Dressing clean dry and intact. Voiding adequate amounts using bed side commode.Plans to discharge to tcu tomorrow.

## 2018-11-15 NOTE — PROGRESS NOTES
[facility]  Hospitalist Progress Note  Jacoby Guido MD 11/15/2018        Reason for hospital stay: bilateral TKA         Brief Summary of Stay       Delilah is a 58 year old female with a PMH significant for SHIRA on CPAP, anxiety, depression, HLD, former tobacco abuse, and mild intermittent asthma who is s/p bilateral TKA.     Assessment and Plan          #1.POD#2- doing well   -Patient is doing very well postoperatively and has been working with physical therapy, pain is controlled, DVT prophylaxis pain control and further surgical plan per orthopedic physician.  I do recommend she will be placed on some form of DVT prophylaxis due to increased risk of DVT in this patient with bilateral lower extremity surgery.    #2.Postop pain:controlled     #3.Postop hypoxia: resolved     #4.Postop acute blood loss anemia: asymptomatic          Hemoglobin   Date Value Ref Range Status   11/15/2018 8.0 (L) 11.7 - 15.7 g/dL Final   ]  --Continue to monitor hemoglobin and transfuse as needed.  Patient was counseled the need to replete her iron stores with oral iron at this point as there is no indication for transfusion.  #5.Acute and Chronic medical conditions.         SHIRA: continue nightly CPAP use     Anxiety, depression, PTSD: continue Amitriptyline, Sertraline, and PRN Atarax     HLD: continue Atorvastatin  Constipation - Miralax ordered, improving           DVT Prophylaxis: Defer to primary service    Code Status: Full Code    Discharge Dispo: TCU     Estimated Disch Date / # of Days until Disch: Per ortho            Interval History (Subjective):      Patient was seen and examined by me this morning.  She has no complaints.  Pain is well controlled, she is working with physical therapy, no chest pain shortness of breath or fever.                          Physical Exam:        Last Vital Signs:  /53  Pulse 90  Temp 97.4  F (36.3  C)  Resp 16  Ht 1.524 m (5')  Wt 78 kg (172 lb)  LMP 04/25/2007   SpO2 99%  BMI 33.59 kg/m2    I/O last 3 completed shifts:  In: 850 [P.O.:850]  Out: 4100 [Urine:4100]  Wt Readings from Last 1 Encounters:   11/13/18 78 kg (172 lb)     Vitals:    11/09/18 1000 11/13/18 0609   Weight: 78.9 kg (174 lb) 78 kg (172 lb)                Physical Exam:   Blood pressure 111/53, pulse 90, temperature 97.4  F (36.3  C), resp. rate 16, height 1.524 m (5'), weight 78 kg (172 lb), last menstrual period 04/25/2007, SpO2 99 %, not currently breastfeeding.    Constitutional:   awake and alert     Neck:   supple, symmetrical, trachea midline, skin normal and no stridor     Lungs:   no increased work of breathing, good air exchange, no retractions and clear to auscultation     Cardiovascular:   normal apical pulses  and normal S1 and S2     Abdomen:   normal bowel sounds and soft     Neurologic:   Mental Status Exam:  Level of Alertness:   awake  Cranial Nerves:  cranial nerves II-XII are grossly intact  Motor Exam:  moves all extremities well and symmetrically            Medications:      All current medications were reviewed with changes reflected in problem list.    Current Facility-Administered Medications   Medication     [START ON 11/16/2018] acetaminophen (TYLENOL) tablet 650 mg     acetaminophen (TYLENOL) tablet 975 mg     albuterol (PROAIR HFA/PROVENTIL HFA/VENTOLIN HFA) 108 (90 Base) MCG/ACT inhaler 2 puff     alpha-lipoic acid CAPS 1 capsule     amitriptyline (ELAVIL) tablet 25 mg     atorvastatin (LIPITOR) tablet 40 mg     benzocaine-menthol (CHLORASEPTIC) 6-10 MG lozenge 1-2 lozenge     Biotin TABS 1 tablet     celecoxib (celeBREX) capsule 200 mg     docusate sodium (COLACE) capsule 100 mg     enoxaparin (LOVENOX) injection 40 mg     ferrous sulfate (IRON) tablet 325 mg     fluticasone (FLONASE) 50 MCG/ACT spray 1 spray     HYDROmorphone (PF) (DILAUDID) injection 0.3-0.5 mg     hydrOXYzine (ATARAX) tablet 25-50 mg     lidocaine (LMX4) kit     lidocaine 1 % 1 mL     magnesium oxide  (MAG-OX) tablet 400 mg     methocarbamol (ROBAXIN) tablet 1,000 mg     naloxone (NARCAN) injection 0.1-0.4 mg     ondansetron (ZOFRAN-ODT) ODT tab 4 mg    Or     ondansetron (ZOFRAN) injection 4 mg     oxyCODONE IR (ROXICODONE) tablet 5-10 mg     polyethylene glycol (MIRALAX/GLYCOLAX) Packet 17 g     prochlorperazine (COMPAZINE) injection 10 mg    Or     prochlorperazine (COMPAZINE) tablet 10 mg     ranitidine (ZANTAC) tablet 150 mg     senna-docusate (SENOKOT-S;PERICOLACE) 8.6-50 MG per tablet 1 tablet     sertraline (ZOLOFT) tablet 150 mg     sodium chloride (PF) 0.9% PF flush 3 mL     sodium chloride (PF) 0.9% PF flush 3 mL     sodium chloride 0.9% infusion     tolterodine (DETROL LA) 24 hr capsule 2 mg     zolpidem (AMBIEN) tablet 5 mg            Data:         Labs:  All laboratory and imaging data in the past 24 hours reviewed   All laboratory and imaging data in the past 24 hours reviewed       Recent Labs  Lab 11/15/18  0611 11/14/18  0725   HGB 8.0* 8.9*   PLT  --  219     No results for input(s): CULT in the last 168 hours.    Recent Labs  Lab 11/15/18  0611 11/14/18  0725   * 144*   CR  --  0.65   GFRESTIMATED  --  >90   GFRESTBLACK  --  >90         Recent Labs  Lab 11/15/18  0611 11/14/18  0725   * 144*           No results for input(s): INR in the last 168 hours.        No results for input(s): TROPONIN, TROPI, TROPR in the last 168 hours.    Invalid input(s): TROP, TROPONINIES    Recent Results (from the past 48 hour(s))   XR Knee Port Bilateral 1/2 Views    Narrative    XR KNEE PORT BILATERAL 1/2 VW 11/13/2018 12:05 PM    COMPARISON: None.    HISTORY: Arthroplasty.      Impression    IMPRESSION: Bilateral total knee arthroplasties. Hardware appears  intact on both sides. No fractures are seen on either side. Surgical  drain in place on the RIGHT.    AURELIO AVILES MD               Imaging:   No results found for this or any previous visit (from the past 24  hour(s)).                  Disclaimer: This note consists of symbols derived from keyboarding, dictation and/or voice recognition software. As a result, there may be errors in the script that have gone undetected. Please consider this when interpreting information found in this chart

## 2018-11-15 NOTE — PLAN OF CARE
Problem: Patient Care Overview  Goal: Plan of Care/Patient Progress Review    Discharge Planner PT   Patient plan for discharge: TCU  Current status: Pt participates in LE TE. Pt completes sit<>supine with SBA. Pt completes sit<>stand with FWW and min A/CGA and cues for safe technique.  Pt ambulates 50' with use of FWW and CGA. Patient positioned in w/c at end of session with all needs in reach.   Barriers to return to prior living situation: stairs  Recommendations for discharge: TCU   Rationale for recommendations: Patient presenting below baseline and has significant obstacles (stairs) at home. Patient reports wanting to discharge to TCU after hospital stay. Anticipate that patient will benefit from further skilled therapy in TCU setting to increase strength, endurance, and independence with functional mobility.        Entered by: Tangela Haley 11/15/2018 12:17 PM

## 2018-11-15 NOTE — PLAN OF CARE
Problem: Patient Care Overview  Goal: Plan of Care/Patient Progress Review    Discharge Planner PT   Patient plan for discharge: TCU  Current status: Patient completes sit <> stand with FWW and CGA/Min A; cues for correct technique and hand placement. Patient ambulates 20' with FWW and CGA, demonstrating step-through gait pattern. Patient ascends/descends 4 stairs with B railings and CGA; cues for correct technique; no LOB or knee buckling noted. After stair negotiation patient with c/o nausea and was assisted to wheelchair for rest and water. At end of session, patient requested min A with B LE for sit > supine secondary to nausea. Nursing notified at end of session about nausea.  Barriers to return to prior living situation: none  Recommendations for discharge: TCU   Rationale for recommendations: Patient presenting below baseline and has significant obstacles (stairs) at home. Patient reports wanting to discharge to TCU after hospital stay. Anticipate that patient will benefit from further skilled therapy in TCU setting to increase strength, endurance, and independence with functional mobility.        Entered by: Tangela Haley 11/15/2018 3:16 PM

## 2018-11-15 NOTE — TELEPHONE ENCOUNTER
Aily[facility] returned call  Pt is no longer transferring to their facility, no need for referral    Chandni Kendrick RN, BS  Clinical Nurse Triage.

## 2018-11-15 NOTE — PLAN OF CARE
Problem: Knee Arthroplasty (Total, Partial) (Adult)  Goal: Signs and Symptoms of Listed Potential Problems Will be Absent, Minimized or Managed (Knee Arthroplasty)  Signs and symptoms of listed potential problems will be absent, minimized or managed by discharge/transition of care (reference Knee Arthroplasty (Total, Partial) (Adult) CPG).   Outcome: Improving  Afeb, vss, cms intact, ace wraps off and aquacel with only scant dried drainage. Left knee that lost the hemavac in surgery/pacu is more swollen and bruised than right. Hgb 8.0 only symptom is mild tachycardia, will be rechecked in am. Pain better today, controlled with po analgesics alone. Up with assist of 1 and walker. Doing well with exercises and walking. [facility] saw pt and has bed set up at TCU for pt tomorrow at 1300.

## 2018-11-16 ENCOUNTER — APPOINTMENT (OUTPATIENT)
Dept: PHYSICAL THERAPY | Facility: CLINIC | Age: 58
DRG: 462 | End: 2018-11-16
Attending: ORTHOPAEDIC SURGERY
Payer: COMMERCIAL

## 2018-11-16 VITALS
HEART RATE: 90 BPM | BODY MASS INDEX: 33.77 KG/M2 | OXYGEN SATURATION: 97 % | DIASTOLIC BLOOD PRESSURE: 60 MMHG | WEIGHT: 172 LBS | RESPIRATION RATE: 16 BRPM | HEIGHT: 60 IN | TEMPERATURE: 98.2 F | SYSTOLIC BLOOD PRESSURE: 119 MMHG

## 2018-11-16 LAB
HGB BLD-MCNC: 8.2 G/DL (ref 11.7–15.7)
PLATELET # BLD AUTO: 216 10E9/L (ref 150–450)

## 2018-11-16 PROCEDURE — 25000128 H RX IP 250 OP 636: Performed by: ORTHOPAEDIC SURGERY

## 2018-11-16 PROCEDURE — 97530 THERAPEUTIC ACTIVITIES: CPT | Mod: GP | Performed by: PHYSICAL THERAPY ASSISTANT

## 2018-11-16 PROCEDURE — 97110 THERAPEUTIC EXERCISES: CPT | Mod: GP | Performed by: PHYSICAL THERAPY ASSISTANT

## 2018-11-16 PROCEDURE — 25000132 ZZH RX MED GY IP 250 OP 250 PS 637: Performed by: ORTHOPAEDIC SURGERY

## 2018-11-16 PROCEDURE — 97116 GAIT TRAINING THERAPY: CPT | Mod: GP | Performed by: PHYSICAL THERAPY ASSISTANT

## 2018-11-16 PROCEDURE — 85049 AUTOMATED PLATELET COUNT: CPT | Performed by: ORTHOPAEDIC SURGERY

## 2018-11-16 PROCEDURE — 36415 COLL VENOUS BLD VENIPUNCTURE: CPT | Performed by: ORTHOPAEDIC SURGERY

## 2018-11-16 PROCEDURE — 85018 HEMOGLOBIN: CPT | Performed by: INTERNAL MEDICINE

## 2018-11-16 PROCEDURE — 25000132 ZZH RX MED GY IP 250 OP 250 PS 637: Performed by: INTERNAL MEDICINE

## 2018-11-16 PROCEDURE — 85018 HEMOGLOBIN: CPT | Performed by: ORTHOPAEDIC SURGERY

## 2018-11-16 PROCEDURE — 40000193 ZZH STATISTIC PT WARD VISIT: Performed by: PHYSICAL THERAPY ASSISTANT

## 2018-11-16 RX ORDER — METHOCARBAMOL 750 MG/1
750 TABLET, FILM COATED ORAL 4 TIMES DAILY PRN
Qty: 180 TABLET | Refills: 0 | DISCHARGE
Start: 2018-11-16 | End: 2020-10-28

## 2018-11-16 RX ORDER — FERROUS SULFATE 325(65) MG
325 TABLET ORAL 2 TIMES DAILY
Qty: 60 TABLET | Refills: 0 | DISCHARGE
Start: 2018-11-16 | End: 2018-11-16

## 2018-11-16 RX ADMIN — OXYCODONE HYDROCHLORIDE 10 MG: 5 TABLET ORAL at 06:40

## 2018-11-16 RX ADMIN — ACETAMINOPHEN 975 MG: 325 TABLET, FILM COATED ORAL at 06:40

## 2018-11-16 RX ADMIN — RANITIDINE 150 MG: 150 TABLET ORAL at 08:11

## 2018-11-16 RX ADMIN — POLYETHYLENE GLYCOL 3350 17 G: 17 POWDER, FOR SOLUTION ORAL at 08:11

## 2018-11-16 RX ADMIN — OXYCODONE HYDROCHLORIDE 10 MG: 5 TABLET ORAL at 00:41

## 2018-11-16 RX ADMIN — OXYCODONE HYDROCHLORIDE 10 MG: 5 TABLET ORAL at 12:19

## 2018-11-16 RX ADMIN — ENOXAPARIN SODIUM 40 MG: 40 INJECTION SUBCUTANEOUS at 08:14

## 2018-11-16 RX ADMIN — FERROUS SULFATE TAB 325 MG (65 MG ELEMENTAL FE) 325 MG: 325 (65 FE) TAB at 08:11

## 2018-11-16 RX ADMIN — OXYCODONE HYDROCHLORIDE 10 MG: 5 TABLET ORAL at 03:40

## 2018-11-16 RX ADMIN — DOCUSATE SODIUM 100 MG: 100 CAPSULE, LIQUID FILLED ORAL at 08:10

## 2018-11-16 RX ADMIN — TOLTERODINE TARTRATE 2 MG: 2 CAPSULE, EXTENDED RELEASE ORAL at 08:11

## 2018-11-16 RX ADMIN — SENNOSIDES AND DOCUSATE SODIUM 1 TABLET: 8.6; 5 TABLET ORAL at 08:11

## 2018-11-16 RX ADMIN — SERTRALINE HYDROCHLORIDE 150 MG: 50 TABLET ORAL at 08:11

## 2018-11-16 RX ADMIN — ACETAMINOPHEN 650 MG: 325 TABLET, FILM COATED ORAL at 12:19

## 2018-11-16 RX ADMIN — OXYCODONE HYDROCHLORIDE 10 MG: 5 TABLET ORAL at 09:19

## 2018-11-16 ASSESSMENT — ACTIVITIES OF DAILY LIVING (ADL)
ADLS_ACUITY_SCORE: 12

## 2018-11-16 NOTE — DISCHARGE INSTRUCTIONS
Return to clinic in 10-14 days.  Call [phone] to schedule or if you experience any problems before your scheduled appointment.    1. Do exercises at home as instructed by therapist twice a day.   2. Ice knee after exercises and therapy.  3. Examine dressing daily for problems.                                                                                                                                                                                                4. May shower, dressing is waterproof and stays in place until drElisha Follow up visit .   5. Notify your dentist of your implant so you can get antibiotics before any dental work or before any invasive procedure     Call your physician if:   1. Fever greater than 101 degrees with body chills or excessive sweating.  2. Increased redness, localized warmth, tenderness, drainage or swelling at dressing site.    3. Pain not controlled with oral pain medications, ice and rest.   4. No bowel movement in 3 days (may use Milk of Magnesia or other over the counter remedy).  5. Chest pain, shortness of breath, and/or calf pain with excessive swelling.  6. Generalized feeling of illness such as nausea/vomiting and/or lightheaded/dizziness.  7. Any other questions or concerns related to your surgery/recovery.    Thank you for allowing [facility] to participate in your cares!!

## 2018-11-16 NOTE — PROGRESS NOTES
Attempted to see patient for [facility] but was unable as patient was sleeping.    Ryder GAVIRIAN RN-BC HNB-BC HTP

## 2018-11-16 NOTE — PLAN OF CARE
Problem: Patient Care Overview  Goal: Plan of Care/Patient Progress Review  Outcome: Improving  Pt is A&Ox4, tachycardic, Assist of one with walker and gait. Cpap overnight.pain controlled with prn Oxycodone given q3hrs and schedule tylenol. Tolerating regular diet.. Lungs sounds clear. +ve bowel sounds .CMS intact. Dressing clean dry and intact. Voiding adequate amounts in the bathroom.Plans to discharge to tcu today.

## 2018-11-16 NOTE — PROGRESS NOTES
Orthopedic Surgery  11/16/2018  POD#: 3    S: Patient voices no complaints today.     O: Blood pressure 119/60, pulse 90, temperature 98.2  F (36.8  C), resp. rate 16, height 1.524 m (5'), weight 78 kg (172 lb), last menstrual period 04/25/2007, SpO2 97 %, not currently breastfeeding.  Lab Results   Component Value Date    HGB 8.2 11/16/2018     No results found for: INR     I/O last 3 completed shifts:  In: 2180 [P.O.:2180]  Out: 800 [Urine:800]    Neurovascularly intact.  Calves are negative bilaterally, both soft and nontender.  The wound is C/D/I.  The wound looks good with minimal erythema of the surrounding skin.    A: Ms. Miranda is doing well status post Procedure(s):  Bilateral total knee arthroplasty using Arthrex iBalance knee system.    P:   1. Mobilize and continue physical therapy.   2. Anticoagulation - discharge on ASA  3. Post-op anemia - Hgb 8.2 today, stable and asymptomatic.  Should f/u with PCP for Hgb recheck in 3-4 weeks.  4. Pain management - controlled  5. Anticipate discharge to [facility] today.      Darlyn Justin PA-C  O: [phone]

## 2018-11-16 NOTE — PLAN OF CARE
Problem: Patient Care Overview  Goal: Plan of Care/Patient Progress Review  Discharge Planner PT   [facility]  Current status: gait with 4WW to 125 feet good over all pattern and pace ROM both Knees to 90 plus in seated postion VTS test passed able to get into and out of car with no issues  Barriers to return to prior living situation: mobility with B TKA  Recommendations for discharge: TCU per plan established by the SPT.    Rationale for recommendations: planned discharge to TCU later today recommend to PT for discharge.       Entered by: Kristyn Christensen 11/16/2018 11:08 AM

## 2018-11-16 NOTE — PLAN OF CARE
Problem: Patient Care Overview  Goal: Plan of Care/Patient Progress Review  Outcome: Improving  Vital signs stable,  Lungs clear, encouraged inspirometer use.  Bowel sounds hypoactive, passing flatus, voiding.  Dressing intact to bilateral knees, cms intact.  Hgb 8.0, pt asymptomatic.  Ambulated with walker and sba of 1.  Pain controlled with tylenol and oxycodone.  Falls risk precautions.

## 2018-11-19 ENCOUNTER — NURSING HOME VISIT (OUTPATIENT)
Dept: GERIATRICS | Facility: CLINIC | Age: 58
End: 2018-11-19
Payer: COMMERCIAL

## 2018-11-19 VITALS
SYSTOLIC BLOOD PRESSURE: 148 MMHG | TEMPERATURE: 98.8 F | RESPIRATION RATE: 18 BRPM | WEIGHT: 187.6 LBS | BODY MASS INDEX: 36.83 KG/M2 | DIASTOLIC BLOOD PRESSURE: 99 MMHG | OXYGEN SATURATION: 100 % | HEIGHT: 60 IN | HEART RATE: 125 BPM

## 2018-11-19 DIAGNOSIS — Z98.890 POSTOPERATIVE HYPOXIA: ICD-10-CM

## 2018-11-19 DIAGNOSIS — R09.02 POSTOPERATIVE HYPOXIA: ICD-10-CM

## 2018-11-19 DIAGNOSIS — Z96.653 STATUS POST TOTAL BILATERAL KNEE REPLACEMENT: Primary | ICD-10-CM

## 2018-11-19 DIAGNOSIS — G89.18 ACUTE POST-OPERATIVE PAIN: ICD-10-CM

## 2018-11-19 DIAGNOSIS — D62 ANEMIA DUE TO BLOOD LOSS, ACUTE: ICD-10-CM

## 2018-11-19 DIAGNOSIS — R53.81 PHYSICAL DECONDITIONING: ICD-10-CM

## 2018-11-19 PROCEDURE — 99310 SBSQ NF CARE HIGH MDM 45: CPT | Performed by: NURSE PRACTITIONER

## 2018-11-19 RX ORDER — BISACODYL 10 MG
10 SUPPOSITORY, RECTAL RECTAL 2 TIMES DAILY PRN
COMMUNITY
End: 2018-11-20

## 2018-11-19 NOTE — PROGRESS NOTES
[facility]  PRIMARY CARE PROVIDER AND CLINIC:  Roxana Cuadra N [address] [facility] / [facility] [address]  Chief Complaint   Patient presents with     Hospital F/U     [facility] Medical Record Number:  [id number]  Place of Service where encounter took place:  [facility]  (S) [[id number]]    HPI:    Delilah Miranda is a 58 year old  (1960),admitted to the above facility from  [facility].  Hospital stay 11/13/2018 through 11/16/2018.  Admitted to this facility for  rehab, medical management and nursing care.  HPI information obtained from: facility chart records.  Current issues are:         Status post total bilateral knee replacement  Acute post-operative pain  Postoperative hypoxia  Anemia due to blood loss, acute  Physical deconditioning         Hospital Course:    Delilah is a 58 year old female with a PMH significant for SHIRA on CPAP, anxiety, depression, HLD, former tobacco abuse, and mild intermittent asthma who is s/p bilateral TKA.      Assessment and Plan     #1.POD#2- doing well   -Patient is doing very well postoperatively and has been working with physical therapy, pain is controlled, DVT prophylaxis pain control and further surgical plan per orthopedic physician.  I do recommend she will be placed on some form of DVT prophylaxis due to increased risk of DVT in this patient with bilateral lower extremity surgery.     #2.Postop pain:controlled      #3.Postop hypoxia: resolved      #4.Postop acute blood loss anemia: asymptomatic     --------------------------------------------------------    Patient is alert, NAD, lying in bed. Is anxious and intermittently tearful. Reports is nauseous, constipated so has stopped taking narcotic pain medication so pain is out of control. Reports fatigue and some intermittent dizziness when up. Reports is not sleepign well 2/2 preceding.     CODE STATUS/ADVANCE DIRECTIVES DISCUSSION:   CPR/Full code   Patient's living  condition: lives with spouse    ALLERGIES:No known drug allergies and Metal [staples]  PAST MEDICAL HISTORY:  has a past medical history of Anemia; Anxiety (10/31/2013); Degeneration of lumbar or lumbosacral intervertebral disc; Mild intermittent asthma; Other chronic pain; Personal history of tobacco use, presenting hazards to health; PONV (postoperative nausea and vomiting); and Sleep apnea. She also has no past medical history of Chronic infection; History of blood transfusion; or Malignant hyperthermia.  PAST SURGICAL HISTORY:  has a past surgical history that includes NONSPECIFIC PROCEDURE; Arthroscopy knee bilateral;  section; excsion of lypoma; Arthroplasty hip (Right, 2018); Abdomen surgery; Arthroplasty hip (Left, 2018); and Arthroplasty knee (Bilateral, 2018).  FAMILY HISTORY: family history includes Cancer in her maternal grandmother, paternal grandfather, and paternal grandmother; Diabetes in her father, maternal grandfather, and sister; HEART DISEASE in her father; Hypertension in her father.  SOCIAL HISTORY:  reports that she quit smoking about 25 years ago. Her smoking use included Cigarettes. She has a 2.50 pack-year smoking history. She has never used smokeless tobacco. She reports that she drinks alcohol. She reports that she does not use illicit drugs.    Post Discharge Medication Reconciliation Status: discharge medications reconciled and changed, per note/orders (see AVS).  Current Outpatient Prescriptions   Medication Sig Dispense Refill     albuterol (ALBUTEROL) 108 (90 BASE) MCG/ACT inhaler Inhale 2 puffs into the lungs every 6 hours as needed INHALE 1 TO 2 PUFFS EVERY 4 TO 6 HOURS AS NEEDED 2 Inhaler 3     alpha-lipoic acid 600 MG CAPS Take 1 capsule by mouth daily       amitriptyline (ELAVIL) 25 MG tablet TAKE ONE TABLET BY MOUTH AT BEDTIME 90 tablet 1     aspirin 325 MG EC tablet Take one Aspirin tab twice daily for 5 weeks, then resume regular dose daily. 70 tablet  3     ASPIRIN PO Take 81 mg by mouth daily       atorvastatin (LIPITOR) 40 MG tablet Take 1 tablet (40 mg) by mouth daily 90 tablet 3     Biotin 31253 MCG TABS Take 1 tablet by mouth daily       bisacodyl (DULCOLAX) 10 MG Suppository Place 10 mg rectally 2 times daily as needed for constipation       ferrous sulfate (IRON) 325 (65 Fe) MG tablet Take 1 tablet (325 mg) by mouth 2 times daily (with meals) 100 tablet 0     fluticasone (FLONASE) 50 MCG/ACT spray Spray 1 spray into both nostrils daily as needed for rhinitis or allergies 3 Bottle 3     HYDROXYZINE HCL PO Take 25 mg by mouth every 4 hours as needed for itching       ibuprofen (ADVIL/MOTRIN) 800 MG tablet 800 mg every 6 hours as needed (Will stop 7 days pre op) prn       Magnesium (CVS TRIPLE MAGNESIUM COMPLEX) 400 MG CAPS Take 1 capsule by mouth daily       methocarbamol (ROBAXIN) 750 MG tablet Take 1 tablet (750 mg) by mouth 4 times daily as needed for muscle spasms 180 tablet 0     order for DME Equipment being ordered: Walker Wheels () and Walker ()  Treatment Diagnosis: Impaired gait s/p MEKA. 1 each 0     oxyCODONE-acetaminophen (PERCOCET) 5-325 MG per tablet Take 1-2 tablets by mouth every 4 hours as needed for severe pain 60 tablet 0     senna-docusate (SENOKOT-S;PERICOLACE) 8.6-50 MG per tablet Take 1 tablet by mouth 2 times daily 30 tablet 0     sertraline (ZOLOFT) 100 MG tablet TAKE ONE AND ONE-HALF TABLETS  BY MOUTH ONCE DAILY 135 tablet 1     VESICARE 5 MG tablet TAKE ONE TABLET BY MOUTH EVERY DAY 90 tablet 2       ROS:  10 point ROS of systems including Constitutional, Eyes, Respiratory, Cardiovascular, Gastroenterology, Genitourinary, Integumentary, Musculoskeletal, Psychiatric were all negative except for pertinent positives noted in my HPI.    Exam:  BP (!) 148/99  Pulse 125  Temp 98.8  F (37.1  C)  Resp 18  Ht 5' (1.524 m)  Wt 187 lb 9.6 oz (85.1 kg)  LMP 04/25/2007  SpO2 100%  BMI 36.64 kg/m2    GENERAL APPEARANCE: Alert,  in no distress   ENT: Mouth and posterior oropharynx normal, moist mucous membranes   EYES: EOM, conjunctivae, lids, pupils and irises normal   NECK: No adenopathy,masses or thyromegaly   RESP: respiratory effort and palpation of chest normal, Lungs clear to auscultation  CV: Palpation and auscultation of heart done , regular rate and rhythm, no murmur, rub, or gallop, peripheral edema trace bilateral LE edema  ABDOMEN: normal bowel sounds, soft, nontender, no hepatosplenomegaly or other masses   : palpation of bladder WNL   M/S: Gait and station normal   Digits and nails normal - AYALA  SKIN: Inspection of skin and subcutaneous tissue baseline, Palpation of skin and subcutaneous tissue baseline, Aquacel intact to bilateral knees- small shadow drainage, surrounding tissue is clear  NEURO: Cranial nerves 2-12 are normal tested and grossly at patient's baseline, Examination of sensation by touch normal   PSYCH: oriented X 3, affect and mood normal             BP 11/16-11/19: /58-99 mmHg    Lab/Diagnostic data:    CBC RESULTS:   Recent Labs   Lab Test  11/16/18   0605  11/15/18   0611 11/14/18   0725  11/06/18   1025  08/09/18   1620   WBC   --    --    --   6.7  11.7*   RBC   --    --    --   4.79  3.77*   HGB  8.2*  8.0*  8.9*  12.3  10.1*   HCT   --    --    --   39.1  32.0*   MCV   --    --    --   82  85   MCH   --    --    --   25.7*  26.8   MCHC   --    --    --   31.5  31.6   RDW   --    --    --   15.4*  14.7   PLT  216   --   219  291  527*       Last Basic Metabolic Panel:  Recent Labs   Lab Test  11/15/18   0611 11/14/18   0725 11/06/18   1025  08/01/18   0636   NA   --    --   142  139   POTASSIUM   --    --   4.3  4.2   CHLORIDE   --    --   106  106   LEONORA   --    --   9.5  8.4*   CO2   --    --   27  29   BUN   --    --   16  8   CR   --   0.65  0.65  0.57   GLC  133*  144*  119*  124*     Lab Results   Component Value Date    A1C 6.3 07/06/2018    A1C 6.0 01/10/2018        ASSESSMENT/PLAN:    Nausea  Constipation  - fleiets enema NE today  - increase senna s to 2 BID  - enc po fluids  - administer prn Zofran as indicated  - follow clinically    Status post total bilateral knee replacement  Acute post-operative pain-  - pain uncontrolled 2/2 patient not utilizing pain meds  - treat nausea and constipation so patient able to utilize prn narcotics  - continue prn Ibuprofen, Robaxin, oxycodone and atarax- observe for SE  - continue on ASA FS BID for 5 weeks for DVT proph- then return to EC ASA 81 mg  - WBAT, keep surgical incision c/d   - PT/OT  - f/w ortho as directed    Postoperative hypoxia  - resolved  - continue pulmonary toilet    Anemia due to blood loss, acute  *- postop- stable in 8s  - observe for s/s bleeding  - continue Iron supplement but give with food  - recheck periodic HGB  - VS per unit protocol    Physical deconditioning  - 2/2 above  - lives in house with spouse-   - PT/OT  - ongoing discharge planning, SW follow and care conferences per unit protocol               Electronically signed by:  SHERWIN Solitario CNP

## 2018-11-19 NOTE — LETTER
11/19/2018        RE: Delilah Miranda  [address] [facility] [address]        [facility]  PRIMARY CARE PROVIDER AND CLINIC:  Roxana Cuadra [address] [address] / [facility] [address]  Chief Complaint   Patient presents with     Hospital F/U     [facility] Medical Record Number:  [id number]  Place of Service where encounter took place:  [facility]  ([facility]) [[id number]]    HPI:    Delilah Miranda is a 58 year old  (1960),admitted to the above facility from  [facility].  Hospital stay 11/13/2018 through 11/16/2018.  Admitted to this facility for  rehab, medical management and nursing care.  HPI information obtained from: facility chart records.  Current issues are:         Status post total bilateral knee replacement  Acute post-operative pain  Postoperative hypoxia  Anemia due to blood loss, acute  Physical deconditioning         Hospital Course:    Delilah is a 58 year old female with a PMH significant for SHIRA on CPAP, anxiety, depression, HLD, former tobacco abuse, and mild intermittent asthma who is s/p bilateral TKA.      Assessment and Plan     #1.POD#2- doing well   -Patient is doing very well postoperatively and has been working with physical therapy, pain is controlled, DVT prophylaxis pain control and further surgical plan per orthopedic physician.  I do recommend she will be placed on some form of DVT prophylaxis due to increased risk of DVT in this patient with bilateral lower extremity surgery.     #2.Postop pain:controlled      #3.Postop hypoxia: resolved      #4.Postop acute blood loss anemia: asymptomatic     --------------------------------------------------------    Patient is alert, NAD, lying in bed. Is anxious and intermittently tearful. Reports is nauseous, constipated so has stopped taking narcotic pain medication so pain is out of control. Reports fatigue and some intermittent dizziness when up. Reports is not sleepign well 2/2 preceding.      CODE STATUS/ADVANCE DIRECTIVES DISCUSSION:   CPR/Full code   Patient's living condition: lives with spouse    ALLERGIES:No known drug allergies and Metal [staples]  PAST MEDICAL HISTORY:  has a past medical history of Anemia; Anxiety (10/31/2013); Degeneration of lumbar or lumbosacral intervertebral disc; Mild intermittent asthma; Other chronic pain; Personal history of tobacco use, presenting hazards to health; PONV (postoperative nausea and vomiting); and Sleep apnea. She also has no past medical history of Chronic infection; History of blood transfusion; or Malignant hyperthermia.  PAST SURGICAL HISTORY:  has a past surgical history that includes NONSPECIFIC PROCEDURE; Arthroscopy knee bilateral;  section; excsion of lypoma; Arthroplasty hip (Right, 2018); Abdomen surgery; Arthroplasty hip (Left, 2018); and Arthroplasty knee (Bilateral, 2018).  FAMILY HISTORY: family history includes Cancer in her maternal grandmother, paternal grandfather, and paternal grandmother; Diabetes in her father, maternal grandfather, and sister; HEART DISEASE in her father; Hypertension in her father.  SOCIAL HISTORY:  reports that she quit smoking about 25 years ago. Her smoking use included Cigarettes. She has a 2.50 pack-year smoking history. She has never used smokeless tobacco. She reports that she drinks alcohol. She reports that she does not use illicit drugs.    Post Discharge Medication Reconciliation Status: discharge medications reconciled and changed, per note/orders (see AVS).  Current Outpatient Prescriptions   Medication Sig Dispense Refill     albuterol (ALBUTEROL) 108 (90 BASE) MCG/ACT inhaler Inhale 2 puffs into the lungs every 6 hours as needed INHALE 1 TO 2 PUFFS EVERY 4 TO 6 HOURS AS NEEDED 2 Inhaler 3     alpha-lipoic acid 600 MG CAPS Take 1 capsule by mouth daily       amitriptyline (ELAVIL) 25 MG tablet TAKE ONE TABLET BY MOUTH AT BEDTIME 90 tablet 1     aspirin 325 MG EC tablet Take  one Aspirin tab twice daily for 5 weeks, then resume regular dose daily. 70 tablet 3     ASPIRIN PO Take 81 mg by mouth daily       atorvastatin (LIPITOR) 40 MG tablet Take 1 tablet (40 mg) by mouth daily 90 tablet 3     Biotin 27863 MCG TABS Take 1 tablet by mouth daily       bisacodyl (DULCOLAX) 10 MG Suppository Place 10 mg rectally 2 times daily as needed for constipation       ferrous sulfate (IRON) 325 (65 Fe) MG tablet Take 1 tablet (325 mg) by mouth 2 times daily (with meals) 100 tablet 0     fluticasone (FLONASE) 50 MCG/ACT spray Spray 1 spray into both nostrils daily as needed for rhinitis or allergies 3 Bottle 3     HYDROXYZINE HCL PO Take 25 mg by mouth every 4 hours as needed for itching       ibuprofen (ADVIL/MOTRIN) 800 MG tablet 800 mg every 6 hours as needed (Will stop 7 days pre op) prn       Magnesium (CVS TRIPLE MAGNESIUM COMPLEX) 400 MG CAPS Take 1 capsule by mouth daily       methocarbamol (ROBAXIN) 750 MG tablet Take 1 tablet (750 mg) by mouth 4 times daily as needed for muscle spasms 180 tablet 0     order for DME Equipment being ordered: Walker Wheels () and Walker ()  Treatment Diagnosis: Impaired gait s/p MEKA. 1 each 0     oxyCODONE-acetaminophen (PERCOCET) 5-325 MG per tablet Take 1-2 tablets by mouth every 4 hours as needed for severe pain 60 tablet 0     senna-docusate (SENOKOT-S;PERICOLACE) 8.6-50 MG per tablet Take 1 tablet by mouth 2 times daily 30 tablet 0     sertraline (ZOLOFT) 100 MG tablet TAKE ONE AND ONE-HALF TABLETS  BY MOUTH ONCE DAILY 135 tablet 1     VESICARE 5 MG tablet TAKE ONE TABLET BY MOUTH EVERY DAY 90 tablet 2       ROS:  10 point ROS of systems including Constitutional, Eyes, Respiratory, Cardiovascular, Gastroenterology, Genitourinary, Integumentary, Musculoskeletal, Psychiatric were all negative except for pertinent positives noted in my HPI.    Exam:  BP (!) 148/99  Pulse 125  Temp 98.8  F (37.1  C)  Resp 18  Ht 5' (1.524 m)  Wt 187 lb 9.6 oz  (85.1 kg)  [facility] 04/25/2007  SpO2 100%  BMI 36.64 kg/m2    GENERAL APPEARANCE: Alert, in no distress   ENT: Mouth and posterior oropharynx normal, moist mucous membranes   EYES: EOM, conjunctivae, lids, pupils and irises normal   NECK: No adenopathy,masses or thyromegaly   RESP: respiratory effort and palpation of chest normal, Lungs clear to auscultation  CV: Palpation and auscultation of heart done , regular rate and rhythm, no murmur, rub, or gallop, peripheral edema trace bilateral LE edema  ABDOMEN: normal bowel sounds, soft, nontender, no hepatosplenomegaly or other masses   : palpation of bladder WNL   M/S: Gait and station normal   Digits and nails normal - AYALA  SKIN: Inspection of skin and subcutaneous tissue baseline, Palpation of skin and subcutaneous tissue baseline, Aquacel intact to bilateral knees- small shadow drainage, surrounding tissue is clear  NEURO: Cranial nerves 2-12 are normal tested and grossly at patient's baseline, Examination of sensation by touch normal   PSYCH: oriented X 3, affect and mood normal             BP 11/16-11/19: /58-99 mmHg    Lab/Diagnostic data:    CBC RESULTS:   Recent Labs   Lab Test  11/16/18   0605  11/15/18   0611 11/14/18   0725  11/06/18   1025  08/09/18   1620   WBC   --    --    --   6.7  11.7*   RBC   --    --    --   4.79  3.77*   HGB  8.2*  8.0*  8.9*  12.3  10.1*   HCT   --    --    --   39.1  32.0*   MCV   --    --    --   82  85   MCH   --    --    --   25.7*  26.8   MCHC   --    --    --   31.5  31.6   RDW   --    --    --   15.4*  14.7   PLT  216   --   219  291  527*       Last Basic Metabolic Panel:  Recent Labs   Lab Test  11/15/18   0611  11/14/18   0725  11/06/18   1025  08/01/18   0636   NA   --    --   142  139   POTASSIUM   --    --   4.3  4.2   CHLORIDE   --    --   106  106   LEONORA   --    --   9.5  8.4*   CO2   --    --   27  29   BUN   --    --   16  8   CR   --   0.65  0.65  0.57   GLC  133*  144*  119*  124*     Lab Results    Component Value Date    A1C 6.3 07/06/2018    A1C 6.0 01/10/2018       ASSESSMENT/PLAN:    Nausea  Constipation  - fleiets enema MD today  - increase senna s to 2 BID  - enc po fluids  - administer prn Zofran as indicated  - follow clinically    Status post total bilateral knee replacement  Acute post-operative pain-  - pain uncontrolled 2/2 patient not utilizing pain meds  - treat nausea and constipation so patient able to utilize prn narcotics  - continue prn Ibuprofen, Robaxin, oxycodone and atarax- observe for SE  - continue on ASA FS BID for 5 weeks for DVT proph- then return to EC ASA 81 mg  - WBAT, keep surgical incision c/d   - PT/OT  - f/w ortho as directed    Postoperative hypoxia  - resolved  - continue pulmonary toilet    Anemia due to blood loss, acute  *- postop- stable in 8s  - observe for s/s bleeding  - continue Iron supplement but give with food  - recheck periodic HGB  - VS per unit protocol    Physical deconditioning  - 2/2 above  - lives in house with spouse-   - PT/OT  - ongoing discharge planning, SW follow and care conferences per unit protocol               Electronically signed by:  SHERWIN Solitario CNP                    Sincerely,        SHERWIN Solitario CNP

## 2018-11-20 ENCOUNTER — DISCHARGE SUMMARY NURSING HOME (OUTPATIENT)
Dept: GERIATRICS | Facility: CLINIC | Age: 58
End: 2018-11-20
Payer: COMMERCIAL

## 2018-11-20 VITALS
WEIGHT: 180.2 LBS | BODY MASS INDEX: 27.31 KG/M2 | TEMPERATURE: 98.1 F | SYSTOLIC BLOOD PRESSURE: 132 MMHG | DIASTOLIC BLOOD PRESSURE: 80 MMHG | RESPIRATION RATE: 16 BRPM | OXYGEN SATURATION: 99 % | HEIGHT: 68 IN | HEART RATE: 104 BPM

## 2018-11-20 DIAGNOSIS — Z96.653 STATUS POST TOTAL BILATERAL KNEE REPLACEMENT: Primary | ICD-10-CM

## 2018-11-20 DIAGNOSIS — R09.02 POSTOPERATIVE HYPOXIA: ICD-10-CM

## 2018-11-20 DIAGNOSIS — R53.81 PHYSICAL DECONDITIONING: ICD-10-CM

## 2018-11-20 DIAGNOSIS — D62 ANEMIA DUE TO BLOOD LOSS, ACUTE: ICD-10-CM

## 2018-11-20 DIAGNOSIS — Z98.890 POSTOPERATIVE HYPOXIA: ICD-10-CM

## 2018-11-20 DIAGNOSIS — G89.18 ACUTE POST-OPERATIVE PAIN: ICD-10-CM

## 2018-11-20 PROCEDURE — 99316 NF DSCHRG MGMT 30 MIN+: CPT | Performed by: NURSE PRACTITIONER

## 2018-11-20 RX ORDER — ALBUTEROL SULFATE 90 UG/1
2 AEROSOL, METERED RESPIRATORY (INHALATION) EVERY 4 HOURS PRN
COMMUNITY
End: 2019-09-13

## 2018-11-20 RX ORDER — AMOXICILLIN 250 MG
2 CAPSULE ORAL 2 TIMES DAILY
COMMUNITY
End: 2019-10-23

## 2018-11-20 NOTE — LETTER
11/20/2018        RE: Delilah Miranda  [id number] [facility] [address]          [place] [facility] DISCHARGE SUMMARY    PATIENT'S NAME: Delilah Miranda  YOB: 1960  MEDICAL RECORD NUMBER:  [id number]  Place of Service where encounter took place:  [facility]  (S) [[id number]]    PRIMARY CARE PROVIDER AND CLINIC RESPONSIBLE AFTER TRANSFER: Roxana Cuadra [address] [address] [facility] / [facility] [address]     TRANSFERRING PROVIDERS: SHERWIN Solitario CNP, Corey Floyd MD  DATE OF SNF ADMISSION:  November / 16 / 2018  DATE OF SNF (anticipated) DISCHARGE: November / 24 / 2018  DISCHARGE DISPOSITION: [facility] Provider   RECENT HOSPITALIZATION/ED:  [facility] stay 11/13/2018 through 11/16/2018.     CODE STATUS/ADVANCE DIRECTIVES DISCUSSION:   CPR/Full code      Allergies   Allergen Reactions     No Known Drug Allergies      Metal [Staples] Rash     Condition on Discharge:  Improving.  DISCHARGE DIAGNOSIS:   1. Status post total bilateral knee replacement    2. Acute post-operative pain    3. Postoperative hypoxia    4. Anemia due to blood loss, acute    5. Physical deconditioning      TCU Facility Course:  Admitted to [facility] following hospitalization as above for rehabilitation for post total bilateral knee replacement. Please see TCU admit note of 11/19/2018 for further details regarding hospitalization.    Status post total bilateral knee replacement  Acute post-operative pain-  -making gains in rehab and pain now controlled on current regimen  - continue prn Ibuprofen, Robaxin, oxycodone and atarax- observe for SE  - continue on ASA FS BID for 5 weeks for DVT proph- then return to EC ASA 81 mg  - WBAT, keep surgical incision c/d   - plans for outpatient PT through [facility]  - f/w ortho as directed     Postoperative hypoxia  - resolved  - continue pulmonary toilet    Nausea  Constipation  - much improved, now moving bowels  - increased senna s to 2  BID  - prn Zofran as indicated        Anemia due to blood loss, acute  *- postop- stable in 8s  - observe for s/s bleeding  - continue Iron supplement but give with food  - recheck periodic HGB per PCP       Physical deconditioning  - 2/2 above  - lives in house with spouse- will return   - outpatient PT     - PAST MEDICAL HISTORY:  has a past medical history of Anemia; Anxiety (10/31/2013); Degeneration of lumbar or lumbosacral intervertebral disc; Mild intermittent asthma; Other chronic pain; Personal history of tobacco use, presenting hazards to health; PONV (postoperative nausea and vomiting); and Sleep apnea. She also has no past medical history of Chronic infection; History of blood transfusion; or Malignant hyperthermia.    DISCHARGE MEDICATIONS:  Current Outpatient Prescriptions   Medication Sig Dispense Refill     albuterol (PROAIR HFA/PROVENTIL HFA/VENTOLIN HFA) 108 (90 Base) MCG/ACT inhaler Inhale 2 puffs into the lungs every 4 hours as needed for shortness of breath / dyspnea or wheezing       alpha-lipoic acid 600 MG CAPS Take 1 capsule by mouth daily       amitriptyline (ELAVIL) 25 MG tablet TAKE ONE TABLET BY MOUTH AT BEDTIME 90 tablet 1     aspirin 325 MG EC tablet Take one Aspirin tab twice daily for 5 weeks, then resume regular dose daily. 70 tablet 3     ASPIRIN PO Take 81 mg by mouth daily       atorvastatin (LIPITOR) 40 MG tablet Take 1 tablet (40 mg) by mouth daily 90 tablet 3     Biotin 46687 MCG TABS Take 1 tablet by mouth daily       ferrous sulfate (IRON) 325 (65 Fe) MG tablet Take 1 tablet (325 mg) by mouth 2 times daily (with meals) 100 tablet 0     fluticasone (FLONASE) 50 MCG/ACT spray Spray 1 spray into both nostrils daily as needed for rhinitis or allergies 3 Bottle 3     HYDROXYZINE HCL PO Take 25 mg by mouth every 4 hours as needed for itching       ibuprofen (ADVIL/MOTRIN) 800 MG tablet 800 mg every 6 hours as needed (Will stop 7 days pre op) prn       Magnesium (CVS TRIPLE  "MAGNESIUM COMPLEX) 400 MG CAPS Take 1 capsule by mouth daily       methocarbamol (ROBAXIN) 750 MG tablet Take 1 tablet (750 mg) by mouth 4 times daily as needed for muscle spasms 180 tablet 0     OMEPRAZOLE PO Take 20 mg by mouth daily       ONDANSETRON PO Take 4 mg by mouth every 6 hours as needed for nausea       order for DME Equipment being ordered: Walker Wheels () and Walker ()  Treatment Diagnosis: Impaired gait s/p MEKA. 1 each 0     oxyCODONE-acetaminophen (PERCOCET) 5-325 MG per tablet Take 1-2 tablets by mouth every 4 hours as needed for severe pain 60 tablet 0     senna-docusate (SENOKOT-S;PERICOLACE) 8.6-50 MG per tablet Take 2 tablets by mouth 2 times daily       sertraline (ZOLOFT) 100 MG tablet TAKE ONE AND ONE-HALF TABLETS  BY MOUTH ONCE DAILY 135 tablet 1     VESICARE 5 MG tablet TAKE ONE TABLET BY MOUTH EVERY DAY 90 tablet 2     [DISCONTINUED] albuterol (ALBUTEROL) 108 (90 BASE) MCG/ACT inhaler Inhale 2 puffs into the lungs every 6 hours as needed INHALE 1 TO 2 PUFFS EVERY 4 TO 6 HOURS AS NEEDED (Patient not taking: Reported on 11/20/2018) 2 Inhaler 3       MEDICATION CHANGES/RATIONALE:   As above  Controlled medications sent with patient:   Script for oxycodone 5 mg medication for 15 tabs and 0 refills given to patient at dischage to have them fill at their out patient pharmacy     ROS:    4 point ROS including Respiratory, CV, GI and , other than that noted in the HPI,  is negative    Physical Exam:   Vitals: /80  Pulse 104  Temp 98.1  F (36.7  C)  Resp 16  Ht 5' 8\" (1.727 m)  Wt 180 lb 3.2 oz (81.7 kg)  LMP 04/25/2007  SpO2 99%  BMI 27.4 kg/m2  BMI= Body mass index is 27.4 kg/(m^2).    Resp: Effort WNL, LSCTA  CV: S1-2, no S3-4, no murmurs noted- trace edema bilateral LE  Abd- soft, nontender, BS +  Musc- AYALA  Skin- aquacel to bilateral knees with small amount of shadow drainage  Psych- alert, calm, pleasant      DISCHARGE PLAN:  [facility] [phone]  Patient " instructed to follow-up with:  PCP in 7 days      [facility] scheduled appointments:  No future appointments.    MTM referral needed and placed by this provider: No    Pending labs:   [facility] labs   CBC RESULTS:   Recent Labs   Lab Test  11/16/18   0605  11/15/18   0611  11/14/18   0725  11/06/18   1025  08/09/18   1620   WBC   --    --    --   6.7  11.7*   RBC   --    --    --   4.79  3.77*   HGB  8.2*  8.0*  8.9*  12.3  10.1*   HCT   --    --    --   39.1  32.0*   MCV   --    --    --   82  85   MCH   --    --    --   25.7*  26.8   MCHC   --    --    --   31.5  31.6   RDW   --    --    --   15.4*  14.7   PLT  216   --   219  291  527*       Last Basic Metabolic Panel:  Recent Labs   Lab Test  11/15/18   0611  11/14/18   0725  11/06/18   1025  08/01/18   0636   NA   --    --   142  139   POTASSIUM   --    --   4.3  4.2   CHLORIDE   --    --   106  106   LEONORA   --    --   9.5  8.4*   CO2   --    --   27  29   BUN   --    --   16  8   CR   --   0.65  0.65  0.57   GLC  133*  144*  119*  124*       Lab Results   Component Value Date    A1C 6.3 07/06/2018    A1C 6.0 01/10/2018           Discharge Treatments:as above    TOTAL DISCHARGE TIME:   Greater than 30 minutes  Electronically signed by:  SHERWIN Solitario CNP      Sincerely,        SHERWIN Solitario CNP

## 2018-11-20 NOTE — PROGRESS NOTES
[place] [facility] DISCHARGE SUMMARY    PATIENT'S NAME: Delilah Miranda  YOB: 1960  MEDICAL RECORD NUMBER:  [id number]  Place of Service where encounter took place:  [facility]  (S) [[id number]]    PRIMARY CARE PROVIDER AND CLINIC RESPONSIBLE AFTER TRANSFER: Roxana Cuadra [address] [facility] / [facility] [address]     TRANSFERRING PROVIDERS: SHERWIN Solitario CNP, Corey Floyd MD  DATE OF SNF ADMISSION:  November / 16 / 2018  DATE OF SNF (anticipated) DISCHARGE: November / 24 / 2018  DISCHARGE DISPOSITION: [facility] Provider   RECENT HOSPITALIZATION/ED:  [facility] stay 11/13/2018 through 11/16/2018.     CODE STATUS/ADVANCE DIRECTIVES DISCUSSION:   CPR/Full code      Allergies   Allergen Reactions     No Known Drug Allergies      Metal [Staples] Rash     Condition on Discharge:  Improving.  DISCHARGE DIAGNOSIS:   1. Status post total bilateral knee replacement    2. Acute post-operative pain    3. Postoperative hypoxia    4. Anemia due to blood loss, acute    5. Physical deconditioning      TCU Facility Course:  Admitted to [facility] TCU following hospitalization as above for rehabilitation for post total bilateral knee replacement. Please see TCU admit note of 11/19/2018 for further details regarding hospitalization.    Status post total bilateral knee replacement  Acute post-operative pain-  -making gains in rehab and pain now controlled on current regimen  - continue prn Ibuprofen, Robaxin, oxycodone and atarax- observe for SE  - continue on ASA FS BID for 5 weeks for DVT proph- then return to EC ASA 81 mg  - WBAT, keep surgical incision c/d   - plans for outpatient PT through [facility]  - f/w ortho as directed     Postoperative hypoxia  - resolved  - continue pulmonary toilet    Nausea  Constipation  - much improved, now moving bowels  - increased senna s to 2 BID  - prn Zofran as indicated        Anemia due to blood loss, acute  *- postop- stable in  8s  - observe for s/s bleeding  - continue Iron supplement but give with food  - recheck periodic HGB per PCP       Physical deconditioning  - 2/2 above  - lives in house with spouse- will return   - outpatient PT     - PAST MEDICAL HISTORY:  has a past medical history of Anemia; Anxiety (10/31/2013); Degeneration of lumbar or lumbosacral intervertebral disc; Mild intermittent asthma; Other chronic pain; Personal history of tobacco use, presenting hazards to health; PONV (postoperative nausea and vomiting); and Sleep apnea. She also has no past medical history of Chronic infection; History of blood transfusion; or Malignant hyperthermia.    DISCHARGE MEDICATIONS:  Current Outpatient Prescriptions   Medication Sig Dispense Refill     albuterol (PROAIR HFA/PROVENTIL HFA/VENTOLIN HFA) 108 (90 Base) MCG/ACT inhaler Inhale 2 puffs into the lungs every 4 hours as needed for shortness of breath / dyspnea or wheezing       alpha-lipoic acid 600 MG CAPS Take 1 capsule by mouth daily       amitriptyline (ELAVIL) 25 MG tablet TAKE ONE TABLET BY MOUTH AT BEDTIME 90 tablet 1     aspirin 325 MG EC tablet Take one Aspirin tab twice daily for 5 weeks, then resume regular dose daily. 70 tablet 3     ASPIRIN PO Take 81 mg by mouth daily       atorvastatin (LIPITOR) 40 MG tablet Take 1 tablet (40 mg) by mouth daily 90 tablet 3     Biotin 66291 MCG TABS Take 1 tablet by mouth daily       ferrous sulfate (IRON) 325 (65 Fe) MG tablet Take 1 tablet (325 mg) by mouth 2 times daily (with meals) 100 tablet 0     fluticasone (FLONASE) 50 MCG/ACT spray Spray 1 spray into both nostrils daily as needed for rhinitis or allergies 3 Bottle 3     HYDROXYZINE HCL PO Take 25 mg by mouth every 4 hours as needed for itching       ibuprofen (ADVIL/MOTRIN) 800 MG tablet 800 mg every 6 hours as needed (Will stop 7 days pre op) prn       Magnesium (CVS TRIPLE MAGNESIUM COMPLEX) 400 MG CAPS Take 1 capsule by mouth daily       methocarbamol (ROBAXIN) 750 MG  "tablet Take 1 tablet (750 mg) by mouth 4 times daily as needed for muscle spasms 180 tablet 0     OMEPRAZOLE PO Take 20 mg by mouth daily       ONDANSETRON PO Take 4 mg by mouth every 6 hours as needed for nausea       order for DME Equipment being ordered: Walker Wheels () and Walker ()  Treatment Diagnosis: Impaired gait s/p MEKA. 1 each 0     oxyCODONE-acetaminophen (PERCOCET) 5-325 MG per tablet Take 1-2 tablets by mouth every 4 hours as needed for severe pain 60 tablet 0     senna-docusate (SENOKOT-S;PERICOLACE) 8.6-50 MG per tablet Take 2 tablets by mouth 2 times daily       sertraline (ZOLOFT) 100 MG tablet TAKE ONE AND ONE-HALF TABLETS  BY MOUTH ONCE DAILY 135 tablet 1     VESICARE 5 MG tablet TAKE ONE TABLET BY MOUTH EVERY DAY 90 tablet 2     [DISCONTINUED] albuterol (ALBUTEROL) 108 (90 BASE) MCG/ACT inhaler Inhale 2 puffs into the lungs every 6 hours as needed INHALE 1 TO 2 PUFFS EVERY 4 TO 6 HOURS AS NEEDED (Patient not taking: Reported on 11/20/2018) 2 Inhaler 3       MEDICATION CHANGES/RATIONALE:   As above  Controlled medications sent with patient:   Script for oxycodone 5 mg medication for 15 tabs and 0 refills given to patient at dischage to have them fill at their out patient pharmacy     ROS:    4 point ROS including Respiratory, CV, GI and , other than that noted in the HPI,  is negative    Physical Exam:   Vitals: /80  Pulse 104  Temp 98.1  F (36.7  C)  Resp 16  Ht 5' 8\" (1.727 m)  Wt 180 lb 3.2 oz (81.7 kg)  LMP 04/25/2007  SpO2 99%  BMI 27.4 kg/m2  BMI= Body mass index is 27.4 kg/(m^2).    Resp: Effort WNL, LSCTA  CV: S1-2, no S3-4, no murmurs noted- trace edema bilateral LE  Abd- soft, nontender, BS +  Musc- AYALA  Skin- aquacel to bilateral knees with small amount of shadow drainage  Psych- alert, calm, pleasant      DISCHARGE PLAN:  [facility] [phone]  Patient instructed to follow-up with:  PCP in 7 days      Current [place] scheduled appointments:  No future " appointments.    MTM referral needed and placed by this provider: No    Pending labs:   [facility] labs   CBC RESULTS:   Recent Labs   Lab Test  11/16/18   0605  11/15/18   0611 11/14/18   0725  11/06/18   1025  08/09/18   1620   WBC   --    --    --   6.7  11.7*   RBC   --    --    --   4.79  3.77*   HGB  8.2*  8.0*  8.9*  12.3  10.1*   HCT   --    --    --   39.1  32.0*   MCV   --    --    --   82  85   MCH   --    --    --   25.7*  26.8   MCHC   --    --    --   31.5  31.6   RDW   --    --    --   15.4*  14.7   PLT  216   --   219  291  527*       Last Basic Metabolic Panel:  Recent Labs   Lab Test  11/15/18   0611  11/14/18   0725  11/06/18   1025  08/01/18   0636   NA   --    --   142  139   POTASSIUM   --    --   4.3  4.2   CHLORIDE   --    --   106  106   LEONORA   --    --   9.5  8.4*   CO2   --    --   27  29   BUN   --    --   16  8   CR   --   0.65  0.65  0.57   GLC  133*  144*  119*  124*       Lab Results   Component Value Date    A1C 6.3 07/06/2018    A1C 6.0 01/10/2018           Discharge Treatments:as above    TOTAL DISCHARGE TIME:   Greater than 30 minutes  Electronically signed by:  SHERWIN Solitario CNP

## 2018-11-26 ENCOUNTER — TRANSFERRED RECORDS (OUTPATIENT)
Dept: HEALTH INFORMATION MANAGEMENT | Facility: CLINIC | Age: 58
End: 2018-11-26

## 2018-11-26 ENCOUNTER — TELEPHONE (OUTPATIENT)
Dept: FAMILY MEDICINE | Facility: CLINIC | Age: 58
End: 2018-11-26

## 2018-11-27 NOTE — TELEPHONE ENCOUNTER
"Hospital/TCU/ED for chronic condition Discharge Protocol    \"Hi, my name is Angie Welch, a registered nurse, and I am calling from [facility]  I am calling to follow up and see how things are going for you after your recent emergency visit/hospital/TCU stay.\"    Tell me how you are doing now that you are home?\" doing well, saw Ortho and had 1st PT outpatient visit.      Discharge Instructions    \"Let's review your discharge instructions.  What is/are the follow-up recommendations?  Pt. Response: to see primary in one week     \"Has an appointment with your primary care provider been scheduled?\"   No (schedule appointment)- declined    \"When you see the provider, I would recommend that you bring your medications with you.\"    Medications    \"Tell me what changed about your medicines when you discharged?\"    Changes to chronic meds?    0-1    \"What questions do you have about your medications?\"    None     New diagnoses of heart failure, COPD, diabetes, or MI?    No              Medication reconciliation completed? Yes  Was MTM referral placed (*Make sure to put transitions as reason for referral)?   No    Call Summary    \"What questions or concerns do you have about your recent visit and your follow-up care?\"     none    \"If you have questions or things don't continue to improve, we encourage you contact us through the main clinic number (give number).  Even if the clinic is not open, triage nurses are available 24/7 to help you.     We would like you to know that our clinic has extended hours (provide information).  We also have urgent care (provide details on closest location and hours/contact info)\"      \"Thank you for your time and take care!\"    Angie Welch RN           "

## 2018-11-28 NOTE — DISCHARGE SUMMARY
Discharge Summary    Delilah Miranda MRN# [id number]   YOB: 1960 Age: 58 year old     Date of Admission: 11/13/2018    Date of Discharge: 11/16/2018    Reason for Admission: Delialh Miranda is an 58 year old female who was admitted to the hospital following surgery with Dr. Bryon Zaragoza.    Preoperative Diagnosis: djd    Postoperative Diagnosis: djd    Procedure Completed: bilateral total knee arthroplasty     Hospital Course:  Ms. Miranda was admitted and underwent the above procedure. The patient tolerated the procedure well. There were no complications. Following surgery she was admitted to the floor.  During her stay she did not require any blood transfusions.  Her last hemoglobin was noted to be   Lab Results   Component Value Date    HGB 8.2 11/16/2018   .  Her pain was controlled with oral pain medication.  Through her progression in physical therapy, it was determined that she would best be served with a stay at a transitional care unit.  Social work arranged for this.    Discharge Physical Exam:  /60  Pulse 90  Temp 98.2  F (36.8  C)  Resp 16  Ht 1.524 m (5')  Wt 78 kg (172 lb)  LMP 04/25/2007  SpO2 97%  BMI 33.59 kg/m2  Neurovascularly intact, distal pulses present bilaterally.  Calves are negative bilaterally, both soft and nontender.  The wound looks good with minimal erythema of the surrounding skin.    Assessment: Ms. Miranda is stable and doing well status post bilateral total knee arthroplasty on POD #3.    Plan: We will discharge her to a transitional care unit on analgesics and deep venous thrombosis prophylaxis.  She will follow-up with Darlyn Justin PA-C or Dr. Bryon Zaragoza approximately 2 weeks from surgery.  She may call [phone] to schedule an appointment.    Meds:   Delilah Miranda   Home Medication Instructions JARROD:[id number]    Printed on:11/28/18 0948   Medication Information                      alpha-lipoic acid 600 MG CAPS  Take 1 capsule by mouth  daily             amitriptyline (ELAVIL) 25 MG tablet  TAKE ONE TABLET BY MOUTH AT BEDTIME             aspirin 325 MG EC tablet  Take one Aspirin tab twice daily for 5 weeks, then resume regular dose daily.             atorvastatin (LIPITOR) 40 MG tablet  Take 1 tablet (40 mg) by mouth daily             Biotin 05533 MCG TABS  Take 1 tablet by mouth daily             ferrous sulfate (IRON) 325 (65 Fe) MG tablet  Take 1 tablet (325 mg) by mouth 2 times daily (with meals)             fluticasone (FLONASE) 50 MCG/ACT spray  Spray 1 spray into both nostrils daily as needed for rhinitis or allergies             ibuprofen (ADVIL/MOTRIN) 800 MG tablet  800 mg every 6 hours as needed (Will stop 7 days pre op) prn             Magnesium (CVS TRIPLE MAGNESIUM COMPLEX) 400 MG CAPS  Take 1 capsule by mouth daily             methocarbamol (ROBAXIN) 750 MG tablet  Take 1 tablet (750 mg) by mouth 4 times daily as needed for muscle spasms             order for DME  Equipment being ordered: Walker Wheels () and Walker ()  Treatment Diagnosis: Impaired gait s/p MEKA.             oxyCODONE-acetaminophen (PERCOCET) 5-325 MG per tablet  Take 1-2 tablets by mouth every 4 hours as needed for severe pain             sertraline (ZOLOFT) 100 MG tablet  TAKE ONE AND ONE-HALF TABLETS  BY MOUTH ONCE DAILY             VESICARE 5 MG tablet  TAKE ONE TABLET BY MOUTH EVERY DAY                     Darlyn Justin PA-C  O: [phone]

## 2018-11-29 DIAGNOSIS — F41.8 SITUATIONAL ANXIETY: ICD-10-CM

## 2018-11-29 NOTE — TELEPHONE ENCOUNTER
Discontinued      hydrOXYzine (ATARAX) 25 MG tablet (Discontinued)     Last Written Prescription Date: 8/28/18-10/30/18*  Last Fill Quantity: 90 tablet,   # refills: 1  Last Office Visit: 8/9/18 Khalif  Future Office visit:       Routing refill request to provider for review/approval because:  Drug not active on patient's medication list

## 2018-11-30 RX ORDER — HYDROXYZINE HYDROCHLORIDE 25 MG/1
TABLET, FILM COATED ORAL
Qty: 90 TABLET | Refills: 1 | Status: SHIPPED | OUTPATIENT
Start: 2018-11-30 | End: 2019-04-29

## 2018-12-27 ENCOUNTER — TRANSFERRED RECORDS (OUTPATIENT)
Dept: HEALTH INFORMATION MANAGEMENT | Facility: CLINIC | Age: 58
End: 2018-12-27

## 2019-01-24 ENCOUNTER — MYC MEDICAL ADVICE (OUTPATIENT)
Dept: FAMILY MEDICINE | Facility: CLINIC | Age: 59
End: 2019-01-24

## 2019-01-24 DIAGNOSIS — Z29.89 NEED FOR SBE (SUBACUTE BACTERIAL ENDOCARDITIS) PROPHYLAXIS: ICD-10-CM

## 2019-01-24 NOTE — TELEPHONE ENCOUNTER
We think patient is requesting amoxicillin but also did not specify pharmacy so we replied with standard dot phrase for patient to follow the MyChart refill process.   Rafita Barnes RN

## 2019-01-25 ENCOUNTER — MYC MEDICAL ADVICE (OUTPATIENT)
Dept: FAMILY MEDICINE | Facility: CLINIC | Age: 59
End: 2019-01-25

## 2019-01-25 ENCOUNTER — MYC REFILL (OUTPATIENT)
Dept: FAMILY MEDICINE | Facility: CLINIC | Age: 59
End: 2019-01-25

## 2019-01-25 DIAGNOSIS — Z29.8 * * * SBE PROPHYLAXIS * * *: ICD-10-CM

## 2019-01-25 DIAGNOSIS — F41.8 SITUATIONAL ANXIETY: ICD-10-CM

## 2019-01-25 RX ORDER — HYDROXYZINE HYDROCHLORIDE 25 MG/1
TABLET, FILM COATED ORAL
Qty: 90 TABLET | Refills: 1 | Status: CANCELLED | OUTPATIENT
Start: 2019-01-25

## 2019-01-26 RX ORDER — AMOXICILLIN 500 MG/1
2000 CAPSULE ORAL ONCE
Qty: 4 CAPSULE | Refills: 0 | Status: SHIPPED | OUTPATIENT
Start: 2019-01-26 | End: 2019-06-20

## 2019-02-07 ENCOUNTER — TRANSFERRED RECORDS (OUTPATIENT)
Dept: HEALTH INFORMATION MANAGEMENT | Facility: CLINIC | Age: 59
End: 2019-02-07

## 2019-03-11 ENCOUNTER — MYC REFILL (OUTPATIENT)
Dept: FAMILY MEDICINE | Facility: CLINIC | Age: 59
End: 2019-03-11

## 2019-03-11 DIAGNOSIS — F41.9 ANXIETY: ICD-10-CM

## 2019-03-11 DIAGNOSIS — N32.81 OVERACTIVE BLADDER: ICD-10-CM

## 2019-03-11 DIAGNOSIS — E78.5 HYPERLIPIDEMIA LDL GOAL <130: ICD-10-CM

## 2019-03-11 NOTE — TELEPHONE ENCOUNTER
"Requested Prescriptions   Pending Prescriptions Disp Refills     solifenacin (VESICARE) 5 MG tablet  Last Written Prescription Date:  6/11/2018  Last Fill Quantity: 90 tablet,  # refills: 2   Last Office Visit: 11/6/2018 Khalif  Future Office Visit:      90 tablet 2     Sig: Take 1 tablet (5 mg) by mouth daily    Muscarinic Antagonists (Urinary Incontinence Agents) Passed - 3/11/2019 11:20 AM       Passed - Recent (12 mo) or future (30 days) visit within the authorizing provider's specialty    Patient had office visit in the last 12 months or has a visit in the next 30 days with authorizing provider or within the authorizing provider's specialty.  See \"Patient Info\" tab in inbasket, or \"Choose Columns\" in Meds & Orders section of the refill encounter.           Passed - Patient does not have a diagnosis of glaucoma on the problem list    If glaucoma diagnosis is new, refer refill to physician.         Passed - Medication is active on med list       Passed - Patient is 18 years of age or older        atorvastatin (LIPITOR) 40 MG tablet  Last Written Prescription Date:  11/6/2018  Last Fill Quantity: 90 tablet,  # refills: 3   Last Office Visit: 11/6/2018   Future Office Visit:      90 tablet 3     Sig: Take 1 tablet (40 mg) by mouth daily    Statins Protocol Passed - 3/11/2019 11:20 AM       Passed - LDL on file in past 12 months    Recent Labs   Lab Test 11/06/18  1025   LDL 70          Passed - No abnormal creatine kinase in past 12 months    No lab results found.          Passed - Recent (12 mo) or future (30 days) visit within the authorizing provider's specialty    Patient had office visit in the last 12 months or has a visit in the next 30 days with authorizing provider or within the authorizing provider's specialty.  See \"Patient Info\" tab in inbasket, or \"Choose Columns\" in Meds & Orders section of the refill encounter.           Passed - Medication is active on med list       Passed - Patient is age 18 or " "older       Passed - No active pregnancy on record       Passed - No positive pregnancy test in past 12 months        sertraline (ZOLOFT) 100 MG tablet  Last Written Prescription Date:  11/6/2018  Last Fill Quantity: 135,  # refills: 1   Last Office Visit: 11/6/2018 Khalif  Future Office Visit:      135 tablet 1     Sig: TAKE ONE AND ONE-HALF TABLETS  BY MOUTH ONCE DAILY    SSRIs Protocol Passed - 3/11/2019 11:20 AM       Passed - Recent (12 mo) or future (30 days) visit within the authorizing provider's specialty    Patient had office visit in the last 12 months or has a visit in the next 30 days with authorizing provider or within the authorizing provider's specialty.  See \"Patient Info\" tab in inbasket, or \"Choose Columns\" in Meds & Orders section of the refill encounter.             Passed - Medication is active on med list       Passed - Patient is age 18 or older       Passed - No active pregnancy on record       Passed - No positive pregnancy test in last 12 months          "

## 2019-03-13 RX ORDER — ATORVASTATIN CALCIUM 40 MG/1
40 TABLET, FILM COATED ORAL DAILY
Qty: 90 TABLET | Refills: 2 | Status: SHIPPED | OUTPATIENT
Start: 2019-03-13 | End: 2020-10-28

## 2019-03-13 RX ORDER — SERTRALINE HYDROCHLORIDE 100 MG/1
TABLET, FILM COATED ORAL
Qty: 135 TABLET | Refills: 0 | Status: SHIPPED | OUTPATIENT
Start: 2019-03-13 | End: 2019-09-11

## 2019-03-13 RX ORDER — SOLIFENACIN SUCCINATE 5 MG/1
5 TABLET, FILM COATED ORAL DAILY
Qty: 90 TABLET | Refills: 0 | Status: SHIPPED | OUTPATIENT
Start: 2019-03-13 | End: 2019-06-27

## 2019-03-13 NOTE — TELEPHONE ENCOUNTER
Prescription approved per [facility] Refill Protocol.  Stefanie Monteiro RN, BSN

## 2019-03-26 DIAGNOSIS — M62.838 MUSCLE SPASM: ICD-10-CM

## 2019-03-26 NOTE — TELEPHONE ENCOUNTER
methocarbamol (ROBAXIN) 500 MG tablet      Last Written Prescription Date:  11/16/2018  Last Fill Quantity: 180 tablet,   # refills: 0  Last Office Visit: 11/16/2018Khalif  Future Office visit:       Routing refill request to provider for review/approval because:  Drug not on the [facility], [facility] or [facility] refill protocol or controlled substance

## 2019-03-27 NOTE — TELEPHONE ENCOUNTER
Routing refill request to provider for review/approval because:  Drug not on the [facility] refill protocol   Francisca Lynch RN, BSN

## 2019-03-28 RX ORDER — METHOCARBAMOL 500 MG/1
TABLET, FILM COATED ORAL
Qty: 90 TABLET | Refills: 1 | Status: SHIPPED | OUTPATIENT
Start: 2019-03-28 | End: 2019-09-24

## 2019-04-29 DIAGNOSIS — F41.8 SITUATIONAL ANXIETY: ICD-10-CM

## 2019-04-29 NOTE — TELEPHONE ENCOUNTER
"Requested Prescriptions   Pending Prescriptions Disp Refills     hydrOXYzine (ATARAX) 25 MG tablet [Pharmacy Med Name: hydrOXYzine HCL 25MG TAB]  Last Written Prescription Date:  11/30/2018  Last Fill Quantity: 90 tablet,  # refills: 1   Last office visit: 11/6/2018 with prescribing provider:  Khalif   Future Office Visit:     90 tablet 1     Sig: TAKE ONE TO TWO TABLETS BY MOUTH EVERY 6 HOURS AS NEEDED FOR ANXIETY       Antihistamines Protocol Passed - 4/29/2019  8:59 AM        Passed - Recent (12 mo) or future (30 days) visit within the authorizing provider's specialty     Patient had office visit in the last 12 months or has a visit in the next 30 days with authorizing provider or within the authorizing provider's specialty.  See \"Patient Info\" tab in inbasket, or \"Choose Columns\" in Meds & Orders section of the refill encounter.              Passed - Patient is age 3 or older     Apply age and/or weight-based dosing for peds patients age 3 and older.    Forward request to provider for patients under the age of 3.          Passed - Medication is active on med list          "

## 2019-05-01 RX ORDER — HYDROXYZINE HYDROCHLORIDE 25 MG/1
TABLET, FILM COATED ORAL
Qty: 90 TABLET | Refills: 1 | Status: SHIPPED | OUTPATIENT
Start: 2019-05-01 | End: 2019-09-13

## 2019-05-01 NOTE — TELEPHONE ENCOUNTER
Prescription approved per [facility] Refill Protocol.    Tangela Meade RN -- [facility]

## 2019-06-20 DIAGNOSIS — Z29.8 * * * SBE PROPHYLAXIS * * *: ICD-10-CM

## 2019-06-20 NOTE — TELEPHONE ENCOUNTER
"Requested Prescriptions   Pending Prescriptions Disp Refills     amoxicillin (AMOXIL) 500 MG capsule [Pharmacy Med Name: AMOXICILLIN 500MG CAPS] 4 capsule 0     Sig: TAKE FOUR CAPSULES BY MOUTH ONCE FOR 1 DOSE    Last Written Prescription Date: 1/26//19   Last Fill Quantity: 4 capsule,  # refills: 0   Last office visit: 11/6/2018 with prescribing provider:  Khalif Bucio Office Visit:           Oral Acne/Rosacea Medications Protocol Failed - 6/20/2019  8:03 AM        Failed - Confirmation of diagnosis is required     Please confirm diagnosis is acne or rosacea.     If NOT acne or rosacea; refer request to provider for further evaluation.    If diagnosis IS acne or rosacea, OK to refill BASED ON PREVIOUS REFILL CLINICAL NOTE RECOMMENDATION.          Failed - Medication is active on med list        Passed - Patient is 12 years of age or older        Passed - Recent (12 mo) or future (30 days) visit within the authorizing provider's specialty     Patient had office visit in the last 12 months or has a visit in the next 30 days with authorizing provider or within the authorizing provider's specialty.  See \"Patient Info\" tab in inbasket, or \"Choose Columns\" in Meds & Orders section of the refill encounter.              Passed - No active pregnancy on record        Passed - No positive prenancy test is past 12 months          "

## 2019-06-21 RX ORDER — AMOXICILLIN 500 MG/1
CAPSULE ORAL
Qty: 4 CAPSULE | Refills: 0 | Status: SHIPPED | OUTPATIENT
Start: 2019-06-21 | End: 2019-09-13

## 2019-06-27 DIAGNOSIS — N32.81 OVERACTIVE BLADDER: ICD-10-CM

## 2019-06-27 RX ORDER — SOLIFENACIN SUCCINATE 5 MG/1
TABLET, FILM COATED ORAL
Qty: 90 TABLET | Refills: 0 | Status: SHIPPED | OUTPATIENT
Start: 2019-06-27 | End: 2019-09-11

## 2019-06-27 NOTE — TELEPHONE ENCOUNTER
"Requested Prescriptions   Pending Prescriptions Disp Refills     VESICARE 5 MG tablet [Pharmacy Med Name: VESICARE 5MG TABS] 90 tablet 0     Sig: TAKE ONE TABLET BY MOUTH ONCE DAILY       Muscarinic Antagonists (Urinary Incontinence Agents) Passed - 6/27/2019  6:54 AM        Passed - Recent (12 mo) or future (30 days) visit within the authorizing provider's specialty     Patient had office visit in the last 12 months or has a visit in the next 30 days with authorizing provider or within the authorizing provider's specialty.  See \"Patient Info\" tab in inbasket, or \"Choose Columns\" in Meds & Orders section of the refill encounter.              Passed - Patient does not have a diagnosis of glaucoma on the problem list     If glaucoma diagnosis is new, refer refill to physician.          Passed - Medication is active on med list        Passed - Patient is 18 years of age or older        Prescription approved per [facility] Refill Protocol.  Melinda Urbina RN    "

## 2019-07-26 DIAGNOSIS — F41.9 ANXIETY: ICD-10-CM

## 2019-07-26 RX ORDER — SERTRALINE HYDROCHLORIDE 100 MG/1
TABLET, FILM COATED ORAL
Qty: 135 TABLET | Refills: 0 | Status: SHIPPED | OUTPATIENT
Start: 2019-07-26 | End: 2019-09-11

## 2019-07-27 ENCOUNTER — MYC MEDICAL ADVICE (OUTPATIENT)
Dept: FAMILY MEDICINE | Facility: CLINIC | Age: 59
End: 2019-07-27

## 2019-08-13 DIAGNOSIS — J30.9 CHRONIC ALLERGIC RHINITIS: ICD-10-CM

## 2019-08-13 NOTE — TELEPHONE ENCOUNTER
"Requested Prescriptions   Pending Prescriptions Disp Refills     fluticasone (FLONASE) 50 MCG/ACT nasal spray [Pharmacy Med Name: FLUTICASONE NASAL SPRAY]  Last Written Prescription Date:  8/9/2018  Last Fill Quantity: 3 bottle,  # refills: 3   Last office visit: 11/6/2018 with prescribing provider:  Khalif   Future Office Visit:     48 g 3     Sig: INSTILL ONE SPRAY IN EACH NOSTRIL EVERY DAY AS NEEDED FOR RHINITIS OR ALLERGIES       Inhaled Steroids Protocol Failed - 8/13/2019  1:01 PM        Failed - Asthma control assessment score within normal limits in last 6 months     ACT Total Scores 11/11/2016 12/5/2017 7/6/2018   ACT TOTAL SCORE - - -   ASTHMA ER VISITS - - -   ASTHMA HOSPITALIZATIONS - - -   ACT TOTAL SCORE (Goal Greater than or Equal to 20) 21 25 25   In the past 12 months, how many times did you visit the emergency room for your asthma without being admitted to the hospital? 0 0 0   In the past 12 months, how many times were you hospitalized overnight because of your asthma? 0 0 0               Failed - Recent (6 mo) or future (30 days) visit within the authorizing provider's specialty     Patient had office visit in the last 6 months or has a visit in the next 30 days with authorizing provider or within the authorizing provider's specialty.  See \"Patient Info\" tab in inbasket, or \"Choose Columns\" in Meds & Orders section of the refill encounter.            Passed - Patient is age 12 or older        Passed - Medication is active on med list          "

## 2019-08-14 RX ORDER — FLUTICASONE PROPIONATE 50 MCG
SPRAY, SUSPENSION (ML) NASAL
Qty: 48 G | Refills: 0 | Status: SHIPPED | OUTPATIENT
Start: 2019-08-14 | End: 2019-09-13

## 2019-09-13 ENCOUNTER — OFFICE VISIT (OUTPATIENT)
Dept: FAMILY MEDICINE | Facility: CLINIC | Age: 59
End: 2019-09-13
Payer: COMMERCIAL

## 2019-09-13 VITALS
SYSTOLIC BLOOD PRESSURE: 112 MMHG | OXYGEN SATURATION: 97 % | HEART RATE: 85 BPM | WEIGHT: 173 LBS | TEMPERATURE: 98.3 F | DIASTOLIC BLOOD PRESSURE: 72 MMHG | BODY MASS INDEX: 26.3 KG/M2

## 2019-09-13 DIAGNOSIS — Z12.31 ENCOUNTER FOR SCREENING MAMMOGRAM FOR BREAST CANCER: ICD-10-CM

## 2019-09-13 DIAGNOSIS — F41.9 ANXIETY: Primary | ICD-10-CM

## 2019-09-13 DIAGNOSIS — Z23 NEED FOR PROPHYLACTIC VACCINATION AND INOCULATION AGAINST INFLUENZA: ICD-10-CM

## 2019-09-13 DIAGNOSIS — R73.01 ELEVATED FASTING GLUCOSE: ICD-10-CM

## 2019-09-13 DIAGNOSIS — J30.2 SEASONAL ALLERGIES: ICD-10-CM

## 2019-09-13 DIAGNOSIS — J30.9 CHRONIC ALLERGIC RHINITIS: ICD-10-CM

## 2019-09-13 DIAGNOSIS — F51.04 PSYCHOPHYSIOLOGICAL INSOMNIA: ICD-10-CM

## 2019-09-13 DIAGNOSIS — N32.81 OVERACTIVE BLADDER: ICD-10-CM

## 2019-09-13 DIAGNOSIS — D64.9 ANEMIA, UNSPECIFIED TYPE: ICD-10-CM

## 2019-09-13 LAB
ALBUMIN SERPL-MCNC: 4.8 G/DL (ref 3.4–5)
ALP SERPL-CCNC: 122 U/L (ref 40–150)
ALT SERPL W P-5'-P-CCNC: 31 U/L (ref 0–50)
ANION GAP SERPL CALCULATED.3IONS-SCNC: 6 MMOL/L (ref 3–14)
AST SERPL W P-5'-P-CCNC: 17 U/L (ref 0–45)
BILIRUB SERPL-MCNC: 0.8 MG/DL (ref 0.2–1.3)
BUN SERPL-MCNC: 13 MG/DL (ref 7–30)
CALCIUM SERPL-MCNC: 10 MG/DL (ref 8.5–10.1)
CHLORIDE SERPL-SCNC: 106 MMOL/L (ref 94–109)
CO2 SERPL-SCNC: 28 MMOL/L (ref 20–32)
CREAT SERPL-MCNC: 0.61 MG/DL (ref 0.52–1.04)
ERYTHROCYTE [DISTWIDTH] IN BLOOD BY AUTOMATED COUNT: 13.1 % (ref 10–15)
FERRITIN SERPL-MCNC: 43 NG/ML (ref 8–252)
GFR SERPL CREATININE-BSD FRML MDRD: >90 ML/MIN/{1.73_M2}
GLUCOSE SERPL-MCNC: 92 MG/DL (ref 70–99)
HBA1C MFR BLD: 5.8 % (ref 0–5.6)
HCT VFR BLD AUTO: 40.2 % (ref 35–47)
HGB BLD-MCNC: 13.3 G/DL (ref 11.7–15.7)
IRON SATN MFR SERPL: 13 % (ref 15–46)
IRON SERPL-MCNC: 55 UG/DL (ref 35–180)
MCH RBC QN AUTO: 29.2 PG (ref 26.5–33)
MCHC RBC AUTO-ENTMCNC: 33.1 G/DL (ref 31.5–36.5)
MCV RBC AUTO: 88 FL (ref 78–100)
PLATELET # BLD AUTO: 262 10E9/L (ref 150–450)
POTASSIUM SERPL-SCNC: 4.1 MMOL/L (ref 3.4–5.3)
PROT SERPL-MCNC: 8.4 G/DL (ref 6.8–8.8)
RBC # BLD AUTO: 4.56 10E12/L (ref 3.8–5.2)
SODIUM SERPL-SCNC: 140 MMOL/L (ref 133–144)
TIBC SERPL-MCNC: 427 UG/DL (ref 240–430)
WBC # BLD AUTO: 8.2 10E9/L (ref 4–11)

## 2019-09-13 PROCEDURE — 83036 HEMOGLOBIN GLYCOSYLATED A1C: CPT | Performed by: PHYSICIAN ASSISTANT

## 2019-09-13 PROCEDURE — 83540 ASSAY OF IRON: CPT | Performed by: PHYSICIAN ASSISTANT

## 2019-09-13 PROCEDURE — 85027 COMPLETE CBC AUTOMATED: CPT | Performed by: PHYSICIAN ASSISTANT

## 2019-09-13 PROCEDURE — 83550 IRON BINDING TEST: CPT | Performed by: PHYSICIAN ASSISTANT

## 2019-09-13 PROCEDURE — 90471 IMMUNIZATION ADMIN: CPT | Performed by: PHYSICIAN ASSISTANT

## 2019-09-13 PROCEDURE — 90686 IIV4 VACC NO PRSV 0.5 ML IM: CPT | Performed by: PHYSICIAN ASSISTANT

## 2019-09-13 PROCEDURE — 36415 COLL VENOUS BLD VENIPUNCTURE: CPT | Performed by: PHYSICIAN ASSISTANT

## 2019-09-13 PROCEDURE — 99214 OFFICE O/P EST MOD 30 MIN: CPT | Mod: 25 | Performed by: PHYSICIAN ASSISTANT

## 2019-09-13 PROCEDURE — 82728 ASSAY OF FERRITIN: CPT | Performed by: PHYSICIAN ASSISTANT

## 2019-09-13 PROCEDURE — 80053 COMPREHEN METABOLIC PANEL: CPT | Performed by: PHYSICIAN ASSISTANT

## 2019-09-13 RX ORDER — SERTRALINE HYDROCHLORIDE 100 MG/1
TABLET, FILM COATED ORAL
Qty: 135 TABLET | Refills: 1 | Status: SHIPPED | OUTPATIENT
Start: 2019-09-13 | End: 2020-05-04

## 2019-09-13 RX ORDER — SOLIFENACIN SUCCINATE 5 MG/1
5 TABLET, FILM COATED ORAL DAILY
Qty: 90 TABLET | Refills: 0 | Status: SHIPPED | OUTPATIENT
Start: 2019-09-13 | End: 2020-10-28

## 2019-09-13 RX ORDER — ALBUTEROL SULFATE 90 UG/1
2 AEROSOL, METERED RESPIRATORY (INHALATION) EVERY 4 HOURS PRN
Qty: 1 INHALER | Refills: 1 | Status: SHIPPED | OUTPATIENT
Start: 2019-09-13 | End: 2020-10-28

## 2019-09-13 RX ORDER — HYDROXYZINE HYDROCHLORIDE 25 MG/1
TABLET, FILM COATED ORAL
Qty: 90 TABLET | Refills: 1 | Status: SHIPPED | OUTPATIENT
Start: 2019-09-13 | End: 2020-04-20

## 2019-09-13 RX ORDER — FLUTICASONE PROPIONATE 50 MCG
SPRAY, SUSPENSION (ML) NASAL
Qty: 48 G | Refills: 0 | Status: SHIPPED | OUTPATIENT
Start: 2019-09-13 | End: 2020-09-29

## 2019-09-13 ASSESSMENT — ANXIETY QUESTIONNAIRES
2. NOT BEING ABLE TO STOP OR CONTROL WORRYING: NOT AT ALL
7. FEELING AFRAID AS IF SOMETHING AWFUL MIGHT HAPPEN: NOT AT ALL
GAD7 TOTAL SCORE: 0
6. BECOMING EASILY ANNOYED OR IRRITABLE: NOT AT ALL
IF YOU CHECKED OFF ANY PROBLEMS ON THIS QUESTIONNAIRE, HOW DIFFICULT HAVE THESE PROBLEMS MADE IT FOR YOU TO DO YOUR WORK, TAKE CARE OF THINGS AT HOME, OR GET ALONG WITH OTHER PEOPLE: NOT DIFFICULT AT ALL
3. WORRYING TOO MUCH ABOUT DIFFERENT THINGS: NOT AT ALL
1. FEELING NERVOUS, ANXIOUS, OR ON EDGE: NOT AT ALL
5. BEING SO RESTLESS THAT IT IS HARD TO SIT STILL: NOT AT ALL

## 2019-09-13 ASSESSMENT — PATIENT HEALTH QUESTIONNAIRE - PHQ9
SUM OF ALL RESPONSES TO PHQ QUESTIONS 1-9: 2
5. POOR APPETITE OR OVEREATING: NOT AT ALL

## 2019-09-13 NOTE — LETTER
My Asthma Action Plan    Name: Delilah Miranda   YOB: 1960  Date: 9/13/2019   My doctor: Roxana Cuadra PA-C   My clinic: [facility]        My Rescue Medicine:   Albuterol inhaler (Proair/Ventolin/Proventil HFA)  2-4 puffs EVERY 4 HOURS as needed. Use a spacer if recommended by your provider.   My Asthma Severity:   Intermittent / Exercise Induced  Know your asthma triggers: Patient is unaware of triggers             GREEN ZONE   Good Control    I feel good    No cough or wheeze    Can work, sleep and play without asthma symptoms       Take your asthma control medicine every day.     1. If exercise triggers your asthma, take your rescue medication    15 minutes before exercise or sports, and    During exercise if you have asthma symptoms  2. Spacer to use with inhaler: If you have a spacer, make sure to use it with your inhaler             YELLOW ZONE Getting Worse  I have ANY of these:    I do not feel good    Cough or wheeze    Chest feels tight    Wake up at night   1. Keep taking your Green Zone medications  2. Start taking your rescue medicine:    every 20 minutes for up to 1 hour. Then every 4 hours for 24-48 hours.  3. If you stay in the Yellow Zone for more than 12-24 hours, contact your doctor.  4. If you do not return to the Green Zone in 12-24 hours or you get worse, start taking your oral steroid medicine if prescribed by your provider.           RED ZONE Medical Alert - Get Help  I have ANY of these:    I feel awful    Medicine is not helping    Breathing getting harder    Trouble walking or talking    Nose opens wide to breathe       1. Take your rescue medicine NOW  2. If your provider has prescribed an oral steroid medicine, start taking it NOW  3. Call your doctor NOW  4. If you are still in the Red Zone after 20 minutes and you have not reached your doctor:    Take your rescue medicine again and    Call 911 or go to the emergency room right away    See your  regular doctor within 2 weeks of an Emergency Room or Urgent Care visit for follow-up treatment.          Annual Reminders:  Meet with Asthma Educator,  Flu Shot in the Fall, consider Pneumonia Vaccination for patients with asthma (aged 19 and older).    Pharmacy: [facility] [place], [place] - [phone]                        Asthma Triggers  How To Control Things That Make Your Asthma Worse    Triggers are things that make your asthma worse.  Look at the list below to help you find your triggers and   what you can do about them. You can help prevent asthma flare-ups by staying away from your triggers.      Trigger                                                          What you can do   Cigarette Smoke  Tobacco smoke can make asthma worse. Do not allow smoking in your home, car or around you.  Be sure no one smokes at a child s day care or school.  If you smoke, ask your health care provider for ways to help you quit.  Ask family members to quit too.  Ask your health care provider for a referral to Quit Plan to help you quit smoking, or call 9-206-999-PLAN.     Colds, Flu, Bronchitis  These are common triggers of asthma. Wash your hands often.  Don t touch your eyes, nose or mouth.  Get a flu shot every year.     Dust Mites  These are tiny bugs that live in cloth or carpet. They are too small to see. Wash sheets and blankets in hot water every week.   Encase pillows and mattress in dust mite proof covers.  Avoid having carpet if you can. If you have carpet, vacuum weekly.   Use a dust mask and HEPA vacuum.   Pollen and Outdoor Mold  Some people are allergic to trees, grass, or weed pollen, or molds. Try to keep your windows closed.  Limit time out doors when pollen count is high.   Ask you health care provider about taking medicine during allergy season.     Animal Dander  Some people are allergic to skin flakes, urine or saliva from pets with fur or feathers. Keep pets with fur or  feathers out of your home.    If you can t keep the pet outdoors, then keep the pet out of your bedroom.  Keep the bedroom door closed.  Keep pets off cloth furniture and away from stuffed toys.     Mice, Rats, and Cockroaches  Some people are allergic to the waste from these pests.   Cover food and garbage.  Clean up spills and food crumbs.  Store grease in the refrigerator.   Keep food out of the bedroom.   Indoor Mold  This can be a trigger if your home has high moisture. Fix leaking faucets, pipes, or other sources of water.   Clean moldy surfaces.  Dehumidify basement if it is damp and smelly.   Smoke, Strong Odors, and Sprays  These can reduce air quality. Stay away from strong odors and sprays, such as perfume, powder, hair spray, paints, smoke incense, paint, cleaning products, candles and new carpet.   Exercise or Sports  Some people with asthma have this trigger. Be active!  Ask your doctor about taking medicine before sports or exercise to prevent symptoms.    Warm up for 5-10 minutes before and after sports or exercise.     Other Triggers of Asthma  Cold air:  Cover your nose and mouth with a scarf.  Sometimes laughing or crying can be a trigger.  Some medicines and food can trigger asthma.

## 2019-09-13 NOTE — PROGRESS NOTES
Subjective     Delilah Miranda is a 59 year old female who presents to clinic today for the following health issues:    HPI   Depression and Anxiety Follow-Up - PTSD    How are you doing with your depression since your last visit? Improved     How are you doing with your anxiety since your last visit?  Improved     Are you having other symptoms that might be associated with depression or anxiety? No    Have you had a significant life event? No     Do you have any concerns with your use of alcohol or other drugs? No    Social History     Tobacco Use     Smoking status: Former Smoker     Packs/day: 0.50     Years: 5.00     Pack years: 2.50     Types: Cigarettes     Last attempt to quit: 3/29/1993     Years since quittin.4     Smokeless tobacco: Never Used     Tobacco comment: smoked 1 pk qd in her twenties quit at age 26   Substance Use Topics     Alcohol use: Yes     Alcohol/week: 0.0 oz     Comment: 3 beers monthly     Drug use: No     PHQ 3/21/2017 1/10/2018 2018   PHQ-9 Total Score 15 5 3   Q9: Thoughts of better off dead/self-harm past 2 weeks Not at all Not at all Not at all     ELENITA-7 SCORE 2016 3/21/2017 2018   Total Score - - -   Total Score 2 18 0       Medication Followup of elavil, hydroxyzine, vesicare    Taking Medication as prescribed: yes    Side Effects:  None    Medication Helping Symptoms:  Yes    vesicare works, but still having some urge incontinence.           Patient Active Problem List   Diagnosis     Allergic rhinitis     Thoracic or lumbosacral neuritis or radiculitis, unspecified     Mild intermittent asthma     Mild major depression (H)     OA (osteoarthritis) of knee     Hyperlipidemia LDL goal <160     Elevated fasting glucose     Vitamin D deficiency     Heartburn     Snoring     Urinary incontinence, nocturnal enuresis     Anxiety     Overactive bladder     Situational anxiety     S/P total hip arthroplasty     S/P hip replacement, right     SHIRA (obstructive sleep  apnea)     S/P hip replacement, left     S/P total knee arthroplasty     Past Surgical History:   Procedure Laterality Date     ARTHROPLASTY HIP Right 2018    Procedure: ARTHROPLASTY HIP;  Right total hip arthroplasty using a Biomet G7 acetabulum and Taperloc femoral stem. ;  Surgeon: Bryon Zaragoza MD;  Location: [facility] OR     ARTHROPLASTY HIP Left 2018    Procedure: ARTHROPLASTY HIP;  Left total hip arthroplasty using a Biomet G7 acetabulum and Taperloc femoral stem. ;  Surgeon: Bryon Zaragoza MD;  Location: [facility] OR     ARTHROPLASTY KNEE Bilateral 2018    Procedure: Bilateral total knee arthroplasty using Arthrex iBalance knee system;  Surgeon: Bryon Zaragoza MD;  Location: [facility] OR     ARTHROSCOPY KNEE BILATERAL       C NONSPECIFIC PROCEDURE      meniscus      SECTION       excsion of lypoma      times 3     GENITOURINARY SURGERY      bladder sling       Social History     Tobacco Use     Smoking status: Former Smoker     Packs/day: 0.50     Years: 5.00     Pack years: 2.50     Types: Cigarettes     Last attempt to quit: 3/29/1993     Years since quittin.4     Smokeless tobacco: Never Used     Tobacco comment: smoked 1 pk qd in her twenties quit at age 26   Substance Use Topics     Alcohol use: Yes     Alcohol/week: 0.0 oz     Comment: 3 beers monthly     Family History   Problem Relation Age of Onset     Cancer Maternal Grandmother      Cancer Paternal Grandmother      Cancer Paternal Grandfather      Heart Disease Father      Hypertension Father      Diabetes Father      Diabetes Sister      Diabetes Maternal Grandfather            Reviewed and updated as needed this visit by Provider         Review of Systems   ROS COMP: Constitutional, HEENT, cardiovascular, pulmonary, gi and gu systems are negative, except as otherwise noted.      Objective    LMP 2007   There is no height or weight on file to calculate BMI.  Physical Exam   GENERAL APPEARANCE:  healthy, alert and no distress  RESP: lungs clear to auscultation - no rales, rhonchi or wheezes  CV: regular rates and rhythm, normal S1 S2, no S3 or S4 and no murmur, click or rub  NEURO: Normal strength and tone, mentation intact and speech normal  PSYCH: mentation appears normal and affect normal/bright            Assessment & Plan     1. Anxiety  Stable. OV, phone or EV 6 months.   - sertraline (ZOLOFT) 100 MG tablet; TAKE ONE AND ONE-HALF TABLETS BY MOUTH ONCE DAILY  Dispense: 135 tablet; Refill: 1  - hydrOXYzine (ATARAX) 25 MG tablet; TAKE ONE TO TWO TABLETS BY MOUTH EVERY 6 HOURS AS NEEDED FOR ANXIETY  Dispense: 90 tablet; Refill: 1    2. Overactive bladder  Recommend pt follow-up w/ urology  - solifenacin (VESICARE) 5 MG tablet; Take 1 tablet (5 mg) by mouth daily  Dispense: 90 tablet; Refill: 0  - Comprehensive metabolic panel    3. Psychophysiological insomnia  Stable.   - amitriptyline (ELAVIL) 25 MG tablet; TAKE ONE TABLET BY MOUTH AT BEDTIME  Dispense: 90 tablet; Refill: 1    4. Chronic allergic rhinitis  Stable.   - fluticasone (FLONASE) 50 MCG/ACT nasal spray; INSTILL ONE SPRAY IN EACH NOSTRIL EVERY DAY AS NEEDED FOR RHINITIS OR ALLERGIES  Dispense: 48 g; Refill: 0    5. Seasonal allergies    - Asthma Control Test - HIM Scan  - Asthma Action Plan (AAP)  - albuterol (PROAIR HFA/PROVENTIL HFA/VENTOLIN HFA) 108 (90 Base) MCG/ACT inhaler; Inhale 2 puffs into the lungs every 4 hours as needed for shortness of breath / dyspnea or wheezing  Dispense: 1 Inhaler; Refill: 1    6. Elevated fasting glucose    - HEMOGLOBIN A1C    7. Anemia, unspecified type    - Asthma Control Test - HIM Scan  - Comprehensive metabolic panel  - CBC with platelets  - Ferritin  - Iron and iron binding capacity    8. Encounter for screening mammogram for breast cancer    - MA SCREENING DIGITAL BILAT - Future  (s+30); Future    9. Need for prophylactic vaccination and inoculation against influenza    - INFLUENZA QUAD, RECOMBINANT,  P-FREE (RIV4) (FLUBLOCK) [89919]  - Vaccine Administration, Initial [88915]           Return in about 6 months (around 3/13/2020) for Medication Check.    Roxana Cuadra PA-C  [facility]

## 2019-09-14 ASSESSMENT — ASTHMA QUESTIONNAIRES: ACT_TOTALSCORE: 25

## 2019-09-14 ASSESSMENT — ANXIETY QUESTIONNAIRES: GAD7 TOTAL SCORE: 0

## 2019-09-25 ENCOUNTER — ANCILLARY PROCEDURE (OUTPATIENT)
Dept: MAMMOGRAPHY | Facility: CLINIC | Age: 59
End: 2019-09-25
Payer: COMMERCIAL

## 2019-09-25 DIAGNOSIS — Z12.31 ENCOUNTER FOR SCREENING MAMMOGRAM FOR BREAST CANCER: ICD-10-CM

## 2019-09-25 PROCEDURE — 77067 SCR MAMMO BI INCL CAD: CPT | Mod: TC

## 2019-09-25 PROCEDURE — 77063 BREAST TOMOSYNTHESIS BI: CPT | Mod: TC

## 2019-10-02 ENCOUNTER — HEALTH MAINTENANCE LETTER (OUTPATIENT)
Age: 59
End: 2019-10-02

## 2019-10-23 ENCOUNTER — OFFICE VISIT (OUTPATIENT)
Dept: UROLOGY | Facility: CLINIC | Age: 59
End: 2019-10-23
Payer: COMMERCIAL

## 2019-10-23 VITALS — HEART RATE: 81 BPM | DIASTOLIC BLOOD PRESSURE: 81 MMHG | OXYGEN SATURATION: 99 % | SYSTOLIC BLOOD PRESSURE: 139 MMHG

## 2019-10-23 DIAGNOSIS — N32.81 OVERACTIVE BLADDER: ICD-10-CM

## 2019-10-23 DIAGNOSIS — N39.44 URINARY INCONTINENCE, NOCTURNAL ENURESIS: Primary | ICD-10-CM

## 2019-10-23 LAB
ALBUMIN UR-MCNC: NEGATIVE MG/DL
APPEARANCE UR: CLEAR
BILIRUB UR QL STRIP: NEGATIVE
COLOR UR AUTO: YELLOW
GLUCOSE UR STRIP-MCNC: NEGATIVE MG/DL
HGB UR QL STRIP: ABNORMAL
KETONES UR STRIP-MCNC: NEGATIVE MG/DL
LEUKOCYTE ESTERASE UR QL STRIP: NEGATIVE
NITRATE UR QL: NEGATIVE
PH UR STRIP: 7 PH (ref 5–7)
RBC #/AREA URNS AUTO: ABNORMAL /HPF
SOURCE: ABNORMAL
SP GR UR STRIP: 1.01 (ref 1–1.03)
UROBILINOGEN UR STRIP-ACNC: 0.2 EU/DL (ref 0.2–1)
WBC #/AREA URNS AUTO: ABNORMAL /HPF

## 2019-10-23 PROCEDURE — 99203 OFFICE O/P NEW LOW 30 MIN: CPT | Performed by: UROLOGY

## 2019-10-23 PROCEDURE — 81001 URINALYSIS AUTO W/SCOPE: CPT | Performed by: UROLOGY

## 2019-10-23 NOTE — PROGRESS NOTES
"Delilah Miranda is a 59 year old female seen in consultation for OAB. Consult from Roxana Cuadra  (Pt 30 minutes late for visit)    S/p Obtryx sling by Dr Garcia in 2006    Now with frequency (q 90 minutes), urge and urge incontinence (sometimes small amounts, sometimes very large amounts). Does not wear pad at this point but has had several significant accidents requiring her to completely change clothes. Very bothersome. Noc x 2-3, no pad at nite.    Denies dysuria, gross hematuria, significant UTI's.     No RUBEN since sling in 2006; no other  procedures.    Has been on Vesicare 5 for > 5 years; may have helped a bit initially but not helping now. Has not tried other meds. Kegel's have not helped.    Hx 2 vag deliveries, one c/s. No hyster. Not on HRT. Denies constipation.    Drinks 3 caf coffee and 6 Creek per day \"to stay healthy.\" Reviewed voiding diary; reflects above.    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      Past Medical History:   Diagnosis Date     Anemia     after previous hip replacement     Anxiety 10/31/2013     Degeneration of lumbar or lumbosacral intervertebral disc     knees and hips     Mild intermittent asthma      Other chronic pain     lt hip     Personal history of tobacco use, presenting hazards to health      PONV (postoperative nausea and vomiting)      Sleep apnea     Will bring CPAP       Past Surgical History:   Procedure Laterality Date     ARTHROPLASTY HIP Right 4/9/2018    Procedure: ARTHROPLASTY HIP;  Right total hip arthroplasty using a Biomet G7 acetabulum and Taperloc femoral stem. ;  Surgeon: Bryon Zaragoza MD;  Location: [facility] OR     ARTHROPLASTY HIP Left 7/30/2018    Procedure: ARTHROPLASTY HIP;  Left total hip arthroplasty using a Biomet G7 acetabulum and Taperloc femoral stem. ;  Surgeon: Bryon Zaragoza MD;  Location: [facility] OR     ARTHROPLASTY KNEE Bilateral 11/13/2018    Procedure: Bilateral total knee arthroplasty using Arthrex iBalance knee " system;  Surgeon: Bryon Zaragoza MD;  Location: [facility] OR     ARTHROSCOPY KNEE BILATERAL       C NONSPECIFIC PROCEDURE      meniscus      SECTION       excsion of lypoma      times 3     GENITOURINARY SURGERY      bladder sling       Social History     Socioeconomic History     Marital status:      Spouse name: Not on file     Number of children: Not on file     Years of education: Not on file     Highest education level: Not on file   Occupational History     Not on file   Social Needs     Financial resource strain: Not on file     Food insecurity:     Worry: Not on file     Inability: Not on file     Transportation needs:     Medical: Not on file     Non-medical: Not on file   Tobacco Use     Smoking status: Former Smoker     Packs/day: 0.50     Years: 5.00     Pack years: 2.50     Types: Cigarettes     Last attempt to quit: 3/29/1993     Years since quittin.5     Smokeless tobacco: Never Used     Tobacco comment: smoked 1 pk qd in her twenties quit at age 26   Substance and Sexual Activity     Alcohol use: Yes     Alcohol/week: 0.0 standard drinks     Comment: 3 beers monthly     Drug use: No     Sexual activity: Yes     Partners: Male     Birth control/protection: Surgical     Comment:  had vasectomy   Lifestyle     Physical activity:     Days per week: Not on file     Minutes per session: Not on file     Stress: Not on file   Relationships     Social connections:     Talks on phone: Not on file     Gets together: Not on file     Attends Anabaptism service: Not on file     Active member of club or organization: Not on file     Attends meetings of clubs or organizations: Not on file     Relationship status: Not on file     Intimate partner violence:     Fear of current or ex partner: Not on file     Emotionally abused: Not on file     Physically abused: Not on file     Forced sexual activity: Not on file   Other Topics Concern     Parent/sibling w/ CABG, MI or angioplasty before  65F 55M? Not Asked   Social History Narrative     Not on file       Current Outpatient Medications   Medication Sig Dispense Refill     albuterol (PROAIR HFA/PROVENTIL HFA/VENTOLIN HFA) 108 (90 Base) MCG/ACT inhaler Inhale 2 puffs into the lungs every 4 hours as needed for shortness of breath / dyspnea or wheezing 1 Inhaler 1     alpha-lipoic acid 600 MG CAPS Take 1 capsule by mouth daily       amitriptyline (ELAVIL) 25 MG tablet TAKE ONE TABLET BY MOUTH AT BEDTIME 90 tablet 1     aspirin 325 MG EC tablet Take one Aspirin tab twice daily for 5 weeks, then resume regular dose daily. 70 tablet 3     ASPIRIN PO Take 81 mg by mouth daily       atorvastatin (LIPITOR) 40 MG tablet Take 1 tablet (40 mg) by mouth daily 90 tablet 2     Biotin 85257 MCG TABS Take 1 tablet by mouth daily       ferrous sulfate (IRON) 325 (65 Fe) MG tablet Take 1 tablet (325 mg) by mouth 2 times daily (with meals) 100 tablet 0     fluticasone (FLONASE) 50 MCG/ACT nasal spray INSTILL ONE SPRAY IN EACH NOSTRIL EVERY DAY AS NEEDED FOR RHINITIS OR ALLERGIES 48 g 0     hydrOXYzine (ATARAX) 25 MG tablet TAKE ONE TO TWO TABLETS BY MOUTH EVERY 6 HOURS AS NEEDED FOR ANXIETY 90 tablet 1     HYDROXYZINE HCL PO Take 25 mg by mouth every 4 hours as needed for itching       ibuprofen (ADVIL/MOTRIN) 800 MG tablet 800 mg every 6 hours as needed (Will stop 7 days pre op) prn       Magnesium ([employer] TRIPLE MAGNESIUM COMPLEX) 400 MG CAPS Take 1 capsule by mouth daily       methocarbamol (ROBAXIN) 500 MG tablet TAKE TWO TABLETS BY MOUTH THREE TIMES A DAY AS NEEDED FOR MUSCLE SPASMS 90 tablet 1     methocarbamol (ROBAXIN) 750 MG tablet Take 1 tablet (750 mg) by mouth 4 times daily as needed for muscle spasms 180 tablet 0     OMEPRAZOLE PO Take 20 mg by mouth daily       ONDANSETRON PO Take 4 mg by mouth every 6 hours as needed for nausea       order for DME Equipment being ordered: Walker Wheels () and Walker ()  Treatment Diagnosis: Impaired gait s/p MEKA. 1  each 0     oxyCODONE-acetaminophen (PERCOCET) 5-325 MG per tablet Take 1-2 tablets by mouth every 4 hours as needed for severe pain 60 tablet 0     senna-docusate (SENOKOT-S;PERICOLACE) 8.6-50 MG per tablet Take 2 tablets by mouth 2 times daily       sertraline (ZOLOFT) 100 MG tablet TAKE ONE AND ONE-HALF TABLETS BY MOUTH ONCE DAILY 135 tablet 1     solifenacin (VESICARE) 5 MG tablet Take 1 tablet (5 mg) by mouth daily 90 tablet 0       Physical Exam:    GENL: NAD. (Remainder of exam deferred as pt was 30 min late)       Results for orders placed or performed in visit on 10/23/19   UA reflex to Microscopic   Result Value Ref Range    Color Urine Yellow     Appearance Urine Clear     Glucose Urine Negative NEG^Negative mg/dL    Bilirubin Urine Negative NEG^Negative    Ketones Urine Negative NEG^Negative mg/dL    Specific Gravity Urine 1.010 1.003 - 1.035    Blood Urine Trace (A) NEG^Negative    pH Urine 7.0 5.0 - 7.0 pH    Protein Albumin Urine Negative NEG^Negative mg/dL    Urobilinogen Urine 0.2 0.2 - 1.0 EU/dL    Nitrite Urine Negative NEG^Negative    Leukocyte Esterase Urine Negative NEG^Negative    Source Midstream Urine    Urine Microscopic   Result Value Ref Range    WBC Urine 0 - 5 OTO5^0 - 5 /HPF    RBC Urine 2-5 (A) OTO2^O - 2 /HPF         IMP:  1. Urge/freq, large fluids  2. Hx RUBEN, resolved with Obtryx in 2006      PLAN:  1. Discussed situation with patient in detail.  2. Consider moderation of fluids/caffeine; pt expresses understanding  3. RTC for pelvic/cysto  4. 30  minutes spent with patient, 100% spent in counseling and coordination of care for urge/freq.

## 2019-12-04 ENCOUNTER — OFFICE VISIT (OUTPATIENT)
Dept: UROLOGY | Facility: CLINIC | Age: 59
End: 2019-12-04
Payer: COMMERCIAL

## 2019-12-04 VITALS — DIASTOLIC BLOOD PRESSURE: 81 MMHG | HEART RATE: 80 BPM | SYSTOLIC BLOOD PRESSURE: 119 MMHG | OXYGEN SATURATION: 98 %

## 2019-12-04 DIAGNOSIS — N32.81 OVERACTIVE BLADDER: ICD-10-CM

## 2019-12-04 DIAGNOSIS — N39.44 URINARY INCONTINENCE, NOCTURNAL ENURESIS: Primary | ICD-10-CM

## 2019-12-04 LAB
ALBUMIN UR-MCNC: NEGATIVE MG/DL
APPEARANCE UR: CLEAR
BILIRUB UR QL STRIP: NEGATIVE
COLOR UR AUTO: YELLOW
GLUCOSE UR STRIP-MCNC: NEGATIVE MG/DL
HGB UR QL STRIP: NEGATIVE
KETONES UR STRIP-MCNC: NEGATIVE MG/DL
LEUKOCYTE ESTERASE UR QL STRIP: NEGATIVE
NITRATE UR QL: NEGATIVE
PH UR STRIP: 7 PH (ref 5–7)
SOURCE: NORMAL
SP GR UR STRIP: 1.01 (ref 1–1.03)
UROBILINOGEN UR STRIP-ACNC: 0.2 EU/DL (ref 0.2–1)

## 2019-12-04 PROCEDURE — 52000 CYSTOURETHROSCOPY: CPT | Performed by: UROLOGY

## 2019-12-04 PROCEDURE — 99214 OFFICE O/P EST MOD 30 MIN: CPT | Mod: 25 | Performed by: UROLOGY

## 2019-12-04 PROCEDURE — 81003 URINALYSIS AUTO W/O SCOPE: CPT | Performed by: UROLOGY

## 2019-12-04 NOTE — PROGRESS NOTES
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 Also believes she has some RUBEN limited to her 'aqua aerobics in the pool' as she notices 'warmth' near her vagina; otherwise no RUBEN, does not wear a pad for incont day or nite.     Denies dysuria, gross hematuria, frequency; nocturia x 3 (moderate amounts).     Hx infrequent voiding as a child, now voids about q 2-3 hrs.     Nocturia x 2-3; moderate amounts.       Current Outpatient Medications   Medication     albuterol (PROAIR HFA/PROVENTIL HFA/VENTOLIN HFA) 108 (90 Base) MCG/ACT inhaler     alpha-lipoic acid 600 MG CAPS     amitriptyline (ELAVIL) 25 MG tablet     atorvastatin (LIPITOR) 40 MG tablet     Biotin 13341 MCG TABS     ferrous sulfate (IRON) 325 (65 Fe) MG tablet     fluticasone (FLONASE) 50 MCG/ACT nasal spray     hydrOXYzine (ATARAX) 25 MG tablet     HYDROXYZINE HCL PO     ibuprofen (ADVIL/MOTRIN) 800 MG tablet     Magnesium (CVS TRIPLE MAGNESIUM COMPLEX) 400 MG CAPS     methocarbamol (ROBAXIN) 500 MG tablet     methocarbamol (ROBAXIN) 750 MG tablet     order for DME     sertraline (ZOLOFT) 100 MG tablet     solifenacin (VESICARE) 5 MG tablet     No current facility-administered medications for this visit.        Physical Exam:    GENL: NAD.    ABD: Soft, non-tender, no masses.    EG: Well-estrogenized, no masses.    VAGINA: Well-estrogenized, no masses.    BN HYPERMOBILITY: None.    CYSTOCELE: None.    APICAL PROLAPSE: None.    RECTOCELE: None.    BIMANUAL: No mass or tenderness.    Cysto:    (Informed consent obtained. Pause for cause performed)   Sterile prep.    17 Fr scope inserted through urethra. Systematic examination w 70 degree lens.   PVR: 250 cc   MUCOSA: Normal without lesion   ORIFICES: Normal location and morphology   CAPACITY: 750 cc; no pain with filling   Scope withdrawn without untoward effect.    Valsalva:   Mild hypermobility, moderate leakage noted.    (Pt  tolerated procedure without difficulty).          Results for orders placed or performed in visit on 12/04/19   UA reflex to Microscopic     Status: None   Result Value Ref Range    Color Urine Yellow     Appearance Urine Clear     Glucose Urine Negative NEG^Negative mg/dL    Bilirubin Urine Negative NEG^Negative    Ketones Urine Negative NEG^Negative mg/dL    Specific Gravity Urine 1.010 1.003 - 1.035    Blood Urine Negative NEG^Negative    pH Urine 7.0 5.0 - 7.0 pH    Protein Albumin Urine Negative NEG^Negative mg/dL    Urobilinogen Urine 0.2 0.2 - 1.0 EU/dL    Nitrite Urine Negative NEG^Negative    Leukocyte Esterase Urine Negative NEG^Negative    Source Midstream Urine        IMP:  1. Mixed UI, mild  2. Demonstrable RUBEN after sling done eslewhere  3. Generous bladder capacity with suboptimal emptying  3. Hx large fluids and infrequent emptying      PLAN:  1. Discussed situation with patient in detail.  2. Moderate fluids by half, eliminate caffeine, stop Vesicare  3. Timed voiding q 2 hrs  4. RTC 3 mos to review progress, check PVR and re-eval RUBEN  5. 25 minutes spent with patient, more than 50% spent in counseling and coordination of care for incont

## 2019-12-13 DIAGNOSIS — E78.5 HYPERLIPIDEMIA LDL GOAL <130: ICD-10-CM

## 2019-12-13 RX ORDER — ATORVASTATIN CALCIUM 40 MG/1
TABLET, FILM COATED ORAL
Qty: 90 TABLET | Refills: 0 | Status: SHIPPED | OUTPATIENT
Start: 2019-12-13 | End: 2020-03-20

## 2019-12-13 NOTE — TELEPHONE ENCOUNTER
"Requested Prescriptions   Signed Prescriptions Disp Refills    atorvastatin (LIPITOR) 40 MG tablet 90 tablet 0     Sig: TAKE ONE TABLET BY MOUTH ONCE DAILY       Statins Protocol Failed - 12/13/2019  6:46 AM        Failed - LDL on file in past 12 months     Recent Labs   Lab Test 11/06/18  1025   LDL 70             Passed - No abnormal creatine kinase in past 12 months     No lab results found.             Passed - Recent (12 mo) or future (30 days) visit within the authorizing provider's specialty     Patient has had an office visit with the authorizing provider or a provider within the authorizing providers department within the previous 12 mos or has a future within next 30 days. See \"Patient Info\" tab in inbasket, or \"Choose Columns\" in Meds & Orders section of the refill encounter.              Passed - Medication is active on med list        Passed - Patient is age 18 or older        Passed - No active pregnancy on record        Passed - No positive pregnancy test in past 12 months          "

## 2020-02-11 ENCOUNTER — OFFICE VISIT (OUTPATIENT)
Dept: FAMILY MEDICINE | Facility: CLINIC | Age: 60
End: 2020-02-11
Payer: COMMERCIAL

## 2020-02-11 VITALS
HEART RATE: 96 BPM | OXYGEN SATURATION: 94 % | SYSTOLIC BLOOD PRESSURE: 124 MMHG | WEIGHT: 178.5 LBS | DIASTOLIC BLOOD PRESSURE: 80 MMHG | TEMPERATURE: 100.2 F | BODY MASS INDEX: 27.14 KG/M2

## 2020-02-11 DIAGNOSIS — R07.0 THROAT PAIN: ICD-10-CM

## 2020-02-11 DIAGNOSIS — J10.1 INFLUENZA A: Primary | ICD-10-CM

## 2020-02-11 LAB
DEPRECATED S PYO AG THROAT QL EIA: NORMAL
FLUAV+FLUBV AG SPEC QL: NEGATIVE
FLUAV+FLUBV AG SPEC QL: POSITIVE
SPECIMEN SOURCE: ABNORMAL
SPECIMEN SOURCE: NORMAL

## 2020-02-11 PROCEDURE — 99213 OFFICE O/P EST LOW 20 MIN: CPT | Performed by: PHYSICIAN ASSISTANT

## 2020-02-11 PROCEDURE — 87804 INFLUENZA ASSAY W/OPTIC: CPT | Performed by: PHYSICIAN ASSISTANT

## 2020-02-11 PROCEDURE — 87081 CULTURE SCREEN ONLY: CPT | Performed by: PHYSICIAN ASSISTANT

## 2020-02-11 PROCEDURE — 87880 STREP A ASSAY W/OPTIC: CPT | Performed by: PHYSICIAN ASSISTANT

## 2020-02-11 RX ORDER — OSELTAMIVIR PHOSPHATE 75 MG/1
75 CAPSULE ORAL 2 TIMES DAILY
Qty: 10 CAPSULE | Refills: 0 | Status: SHIPPED | OUTPATIENT
Start: 2020-02-11 | End: 2020-02-16

## 2020-02-11 NOTE — PATIENT INSTRUCTIONS
Patient Education     Influenza (Adult)    Influenza is also called the flu. It is a viral illness that affects the air passages of your lungs. It is different from the common cold. The flu can easily be passed from one to person to another. It may be spread through the air by coughing and sneezing. Or it can be spread by touching the sick person and then touching your own eyes, nose, or mouth.  The flu starts 1 to 3 days after you are exposed to the flu virus. It may last for 1 to 2 weeks but many people feel tired or fatigued for many weeks afterward. You usually don t need to take antibiotics unless you have a complication. This might be an ear or sinus infection or pneumonia.  Symptoms of the flu may be mild or severe. They can include extreme tiredness (wanting to stay in bed all day), chills, fevers, muscle aches, soreness with eye movement, headache, and a dry, hacking cough.  Home care  Follow these guidelines when caring for yourself at home:    Avoid being around cigarette smoke, whether yours or other people s.    Acetaminophen or ibuprofen will help ease your fever, muscle aches, and headache. Don t give aspirin to anyone younger than 18 who has the flu. Aspirin can harm the liver.    Nausea and loss of appetite are common with the flu. Eat light meals. Drink 6 to 8 glasses of liquids every day. Good choices are water, sport drinks, soft drinks without caffeine, juices, tea, and soup. Extra fluids will also help loosen secretions in your nose and lungs.    Over-the-counter cold medicines will not make the flu go away faster. But the medicines may help with coughing, sore throat, and congestion in your nose and sinuses. Don t use a decongestant if you have high blood pressure.    Stay home until your fever has been gone for at least 24 hours without using medicine to reduce fever.  Follow-up care  Follow up with your healthcare provider, or as advised, if you are not getting better over the next  week.  If you are age 65 or older, talk with your provider about getting a pneumococcal vaccine every 5 years. You should also get this vaccine if you have chronic asthma or COPD. All adults should get a flu vaccine every fall. Ask your provider about this.  When to seek medical advice  Call your healthcare provider right away if any of these occur:    Cough with lots of colored mucus (sputum) or blood in your mucus    Chest pain, shortness of breath, wheezing, or trouble breathing    Severe headache, or face, neck, or ear pain    New rash with fever    Fever of 100.4 F (38 C) or higher, or as directed by your healthcare provider    Confusion, behavior change, or seizure    Severe weakness or dizziness    You get a new fever or cough after getting better for a few days  Date Last Reviewed: 1/1/2017 2000-2019 [employer]. [address] [place] [address], [place] [address]. All rights reserved. This information is not intended as a substitute for professional medical care. Always follow your healthcare professional's instructions.

## 2020-02-11 NOTE — PROGRESS NOTES
Subjective     Delilah Miranda is a 59 year old female who presents to clinic today for the following health issues:    HPI   Acute Illness   Acute illness concerns: cough   Onset: 02/09/2020    Fever: YES, 100.2    Chills/Sweats: YES    Headache (location?): YES    Sinus Pressure:YES    Conjunctivitis:  no    Ear Pain: no    Rhinorrhea: YES    Congestion: YES    Sore Throat: YES     Cough: YES-productive of clear sputum    Wheeze: YES    Decreased Appetite: YES    Nausea: YES    Vomiting: no     Diarrhea:  no     Dysuria/Freq.: no     Fatigue/Achiness: YES    Sick/Strep Exposure: no      Therapies Tried and outcome: mucinex, albuterol inhaler, tylenol with some relief       She did get a flu shot. Just returned from a cruise in [facility].       Patient Active Problem List   Diagnosis     Allergic rhinitis     Thoracic or lumbosacral neuritis or radiculitis, unspecified     Mild intermittent asthma     Mild major depression (H)     OA (osteoarthritis) of knee     Hyperlipidemia LDL goal <160     Elevated fasting glucose     Vitamin D deficiency     Heartburn     Snoring     Urinary incontinence, nocturnal enuresis     Anxiety     Overactive bladder     Situational anxiety     S/P total hip arthroplasty     S/P hip replacement, right     SHIRA (obstructive sleep apnea)     S/P hip replacement, left     S/P total knee arthroplasty     Past Surgical History:   Procedure Laterality Date     ARTHROPLASTY HIP Right 4/9/2018    Procedure: ARTHROPLASTY HIP;  Right total hip arthroplasty using a Biomet G7 acetabulum and Taperloc femoral stem. ;  Surgeon: Bryon Zaragoza MD;  Location: [facility] OR     ARTHROPLASTY HIP Left 7/30/2018    Procedure: ARTHROPLASTY HIP;  Left total hip arthroplasty using a Biomet G7 acetabulum and Taperloc femoral stem. ;  Surgeon: Bryon Zaragoza MD;  Location: [facility] OR     ARTHROPLASTY KNEE Bilateral 11/13/2018    Procedure: Bilateral total knee arthroplasty using Arthrex iBalance knee  system;  Surgeon: Bryon Zaragoza MD;  Location: [facility] OR     ARTHROSCOPY KNEE BILATERAL       C NONSPECIFIC PROCEDURE      meniscus      SECTION       excsion of lypoma      times 3     GENITOURINARY SURGERY      bladder sling       Social History     Tobacco Use     Smoking status: Former Smoker     Packs/day: 0.50     Years: 5.00     Pack years: 2.50     Types: Cigarettes     Last attempt to quit: 3/29/1993     Years since quittin.8     Smokeless tobacco: Never Used     Tobacco comment: smoked 1 pk qd in her twenties quit at age 26   Substance Use Topics     Alcohol use: Yes     Alcohol/week: 0.0 standard drinks     Comment: 3 beers monthly     Family History   Problem Relation Age of Onset     Cancer Maternal Grandmother      Cancer Paternal Grandmother      Cancer Paternal Grandfather      Heart Disease Father      Hypertension Father      Diabetes Father      Diabetes Sister      Diabetes Maternal Grandfather          Current Outpatient Medications   Medication Sig Dispense Refill     albuterol (PROAIR HFA/PROVENTIL HFA/VENTOLIN HFA) 108 (90 Base) MCG/ACT inhaler Inhale 2 puffs into the lungs every 4 hours as needed for shortness of breath / dyspnea or wheezing 1 Inhaler 1     alpha-lipoic acid 600 MG CAPS Take 1 capsule by mouth daily       amitriptyline (ELAVIL) 25 MG tablet TAKE ONE TABLET BY MOUTH AT BEDTIME 90 tablet 1     atorvastatin (LIPITOR) 40 MG tablet TAKE ONE TABLET BY MOUTH ONCE DAILY 90 tablet 0     atorvastatin (LIPITOR) 40 MG tablet Take 1 tablet (40 mg) by mouth daily 90 tablet 2     Biotin 29020 MCG TABS Take 1 tablet by mouth daily       ferrous sulfate (IRON) 325 (65 Fe) MG tablet Take 1 tablet (325 mg) by mouth 2 times daily (with meals) 100 tablet 0     fluticasone (FLONASE) 50 MCG/ACT nasal spray INSTILL ONE SPRAY IN EACH NOSTRIL EVERY DAY AS NEEDED FOR RHINITIS OR ALLERGIES 48 g 0     hydrOXYzine (ATARAX) 25 MG tablet TAKE ONE TO TWO TABLETS BY MOUTH EVERY 6 HOURS  AS NEEDED FOR ANXIETY 90 tablet 1     HYDROXYZINE HCL PO Take 25 mg by mouth every 4 hours as needed for itching       ibuprofen (ADVIL/MOTRIN) 800 MG tablet 800 mg every 6 hours as needed (Will stop 7 days pre op) prn       Magnesium (CVS TRIPLE MAGNESIUM COMPLEX) 400 MG CAPS Take 1 capsule by mouth daily       methocarbamol (ROBAXIN) 500 MG tablet TAKE TWO TABLETS BY MOUTH THREE TIMES A DAY AS NEEDED FOR MUSCLE SPASMS 90 tablet 1     methocarbamol (ROBAXIN) 750 MG tablet Take 1 tablet (750 mg) by mouth 4 times daily as needed for muscle spasms 180 tablet 0     order for DME Equipment being ordered: Walker Wheels () and Walker ()  Treatment Diagnosis: Impaired gait s/p MEKA. 1 each 0     sertraline (ZOLOFT) 100 MG tablet TAKE ONE AND ONE-HALF TABLETS BY MOUTH ONCE DAILY 135 tablet 1     solifenacin (VESICARE) 5 MG tablet Take 1 tablet (5 mg) by mouth daily 90 tablet 0     Allergies   Allergen Reactions     Nickel      No Known Drug Allergies      Metal [Staples] Rash         Reviewed and updated as needed this visit by Provider         Review of Systems   ROS COMP: Constitutional, HEENT, cardiovascular, pulmonary, gi and gu systems are negative, except as otherwise noted.        Objective    /80 (BP Location: Right arm, Patient Position: Chair, Cuff Size: Adult Regular)   Pulse 96   Temp 100.2  F (37.9  C) (Oral)   Wt 81 kg (178 lb 8 oz)   LMP 04/25/2007   SpO2 94%   BMI 27.14 kg/m    Body mass index is 27.14 kg/m .         Physical Exam   GENERAL: healthy, alert and no distress  EYES: Eyes grossly normal to inspection, PERRL and conjunctivae and sclerae normal  HENT: ear canals and TM's normal, nose and mouth without ulcers or lesions  RESP: lungs clear to auscultation - no rales, rhonchi or wheezes  CV: regular rate and rhythm, normal S1 S2, no S3 or S4, no murmur, click or rub, no peripheral edema and peripheral pulses strong  MS: no gross musculoskeletal defects noted, no edema  SKIN: no  suspicious lesions or rashes  NEURO: Normal strength and tone, mentation intact and speech normal  PSYCH: mentation appears normal, affect normal/bright  LYMPH: anterior cervical: enlarged lymph nodes in the anterior cervical area.    Diagnostic Test Results:  Results for orders placed or performed in visit on 02/11/20 (from the past 24 hour(s))   Influenza A/B antigen   Result Value Ref Range    Influenza A/B Agn Specimen Nasal     Influenza A Positive (A) NEG^Negative    Influenza B Negative NEG^Negative   Strep, Rapid Screen   Result Value Ref Range    Specimen Description Throat     Rapid Strep A Screen       NEGATIVE: No Group A streptococcal antigen detected by immunoassay, await culture report.           Assessment & Plan     (J10.1) Influenza A  (primary encounter diagnosis)    Comment: Treat with Tamiflu since within 48 hours and patient has asthma. Careful monitoring instructions given to watch for signs of pneumonia.    Plan: Influenza A/B antigen, oseltamivir (TAMIFLU) 75        MG capsule              (R07.0) Throat pain    Comment: Rapid strep is negative. Await culture.    Plan: Strep, Rapid Screen, Beta strep group A culture                 Patient Instructions     Patient Education     Influenza (Adult)    Influenza is also called the flu. It is a viral illness that affects the air passages of your lungs. It is different from the common cold. The flu can easily be passed from one to person to another. It may be spread through the air by coughing and sneezing. Or it can be spread by touching the sick person and then touching your own eyes, nose, or mouth.  The flu starts 1 to 3 days after you are exposed to the flu virus. It may last for 1 to 2 weeks but many people feel tired or fatigued for many weeks afterward. You usually don t need to take antibiotics unless you have a complication. This might be an ear or sinus infection or pneumonia.  Symptoms of the flu may be mild or severe. They can include  extreme tiredness (wanting to stay in bed all day), chills, fevers, muscle aches, soreness with eye movement, headache, and a dry, hacking cough.  Home care  Follow these guidelines when caring for yourself at home:    Avoid being around cigarette smoke, whether yours or other people s.    Acetaminophen or ibuprofen will help ease your fever, muscle aches, and headache. Don t give aspirin to anyone younger than 18 who has the flu. Aspirin can harm the liver.    Nausea and loss of appetite are common with the flu. Eat light meals. Drink 6 to 8 glasses of liquids every day. Good choices are water, sport drinks, soft drinks without caffeine, juices, tea, and soup. Extra fluids will also help loosen secretions in your nose and lungs.    Over-the-counter cold medicines will not make the flu go away faster. But the medicines may help with coughing, sore throat, and congestion in your nose and sinuses. Don t use a decongestant if you have high blood pressure.    Stay home until your fever has been gone for at least 24 hours without using medicine to reduce fever.  Follow-up care  Follow up with your healthcare provider, or as advised, if you are not getting better over the next week.  If you are age 65 or older, talk with your provider about getting a pneumococcal vaccine every 5 years. You should also get this vaccine if you have chronic asthma or COPD. All adults should get a flu vaccine every fall. Ask your provider about this.  When to seek medical advice  Call your healthcare provider right away if any of these occur:    Cough with lots of colored mucus (sputum) or blood in your mucus    Chest pain, shortness of breath, wheezing, or trouble breathing    Severe headache, or face, neck, or ear pain    New rash with fever    Fever of 100.4 F (38 C) or higher, or as directed by your healthcare provider    Confusion, behavior change, or seizure    Severe weakness or dizziness    You get a new fever or cough after getting  better for a few days  Date Last Reviewed: 1/1/2017 2000-2019 [employer]. [address], [place] [address]. All rights reserved. This information is not intended as a substitute for professional medical care. Always follow your healthcare professional's instructions.               No follow-ups on file.    Luke De La Paz PA-C  [facility]

## 2020-02-11 NOTE — LETTER
February 11, 2020      Delilah Miranda  [address] [address] [facility] [id number]        To Whom It May Concern:    Delilah Miranda  was seen on 2/11/2020.  Please excuse her  until 2/17/2020 due to illness.        Sincerely,        Luke De La Paz PA-C

## 2020-02-12 LAB
BACTERIA SPEC CULT: NORMAL
SPECIMEN SOURCE: NORMAL

## 2020-02-24 ENCOUNTER — MYC MEDICAL ADVICE (OUTPATIENT)
Dept: FAMILY MEDICINE | Facility: CLINIC | Age: 60
End: 2020-02-24

## 2020-03-11 ENCOUNTER — OFFICE VISIT (OUTPATIENT)
Dept: UROLOGY | Facility: CLINIC | Age: 60
End: 2020-03-11
Payer: COMMERCIAL

## 2020-03-11 VITALS — OXYGEN SATURATION: 100 % | DIASTOLIC BLOOD PRESSURE: 77 MMHG | SYSTOLIC BLOOD PRESSURE: 122 MMHG | HEART RATE: 78 BPM

## 2020-03-11 DIAGNOSIS — N32.81 OVERACTIVE BLADDER: Primary | ICD-10-CM

## 2020-03-11 DIAGNOSIS — N39.44 URINARY INCONTINENCE, NOCTURNAL ENURESIS: ICD-10-CM

## 2020-03-11 LAB
ALBUMIN UR-MCNC: NEGATIVE MG/DL
APPEARANCE UR: CLEAR
BILIRUB UR QL STRIP: NEGATIVE
COLOR UR AUTO: YELLOW
GLUCOSE UR STRIP-MCNC: NEGATIVE MG/DL
HGB UR QL STRIP: ABNORMAL
KETONES UR STRIP-MCNC: NEGATIVE MG/DL
LEUKOCYTE ESTERASE UR QL STRIP: NEGATIVE
NITRATE UR QL: NEGATIVE
PH UR STRIP: 6 PH (ref 5–7)
RBC #/AREA URNS AUTO: ABNORMAL /HPF
SOURCE: ABNORMAL
SP GR UR STRIP: 1.01 (ref 1–1.03)
UROBILINOGEN UR STRIP-ACNC: 0.2 EU/DL (ref 0.2–1)
WBC #/AREA URNS AUTO: ABNORMAL /HPF

## 2020-03-11 PROCEDURE — 99214 OFFICE O/P EST MOD 30 MIN: CPT | Performed by: UROLOGY

## 2020-03-11 PROCEDURE — 81001 URINALYSIS AUTO W/SCOPE: CPT | Performed by: UROLOGY

## 2020-03-11 NOTE — PROGRESS NOTES
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    Has also instituted timed voiding about q 2 hrs during the day.    Also discontinued Vesicare at the last visit.    Now feels much better with resolution of urge/freq.    Denies dysuria, gross hematuria, incontinence. BM's are satisfactory. Nocturia x 2.    Feels much better.      Current Outpatient Medications   Medication     albuterol (PROAIR HFA/PROVENTIL HFA/VENTOLIN HFA) 108 (90 Base) MCG/ACT inhaler     alpha-lipoic acid 600 MG CAPS     amitriptyline (ELAVIL) 25 MG tablet     atorvastatin (LIPITOR) 40 MG tablet     Biotin 53080 MCG TABS     ferrous sulfate (IRON) 325 (65 Fe) MG tablet     fluticasone (FLONASE) 50 MCG/ACT nasal spray     hydrOXYzine (ATARAX) 25 MG tablet     HYDROXYZINE HCL PO     ibuprofen (ADVIL/MOTRIN) 800 MG tablet     Magnesium (CVS TRIPLE MAGNESIUM COMPLEX) 400 MG CAPS     methocarbamol (ROBAXIN) 500 MG tablet     methocarbamol (ROBAXIN) 750 MG tablet     order for DME     sertraline (ZOLOFT) 100 MG tablet     atorvastatin (LIPITOR) 40 MG tablet     solifenacin (VESICARE) 5 MG tablet     No current facility-administered medications for this visit.          Results for orders placed or performed in visit on 03/11/20   UA reflex to Microscopic     Status: Abnormal   Result Value Ref Range    Color Urine Yellow     Appearance Urine Clear     Glucose Urine Negative NEG^Negative mg/dL    Bilirubin Urine Negative NEG^Negative    Ketones Urine Negative NEG^Negative mg/dL    Specific Gravity Urine 1.010 1.003 - 1.035    Blood Urine Trace (A) NEG^Negative    pH Urine 6.0 5.0 - 7.0 pH    Protein Albumin Urine Negative NEG^Negative mg/dL    Urobilinogen Urine 0.2 0.2 - 1.0 EU/dL    Nitrite Urine Negative NEG^Negative    Leukocyte Esterase Urine Negative NEG^Negative    Source Midstream Urine    Urine  Microscopic     Status: Abnormal   Result Value Ref Range    WBC Urine 0 - 5 OTO5^0 - 5 /HPF    RBC Urine 2-5 (A) OTO2^O - 2 /HPF       IMP:  1. Urge/freq and infrequent voiding, resolved with behavioral modification      PLAN:  1. Discussed situation with patient in detail.  2. Continue with moderate fluids/caffeine, timed voiding  3. RTC only PRN  4. 25 minutes spent with patient, more than 50% spent in counseling and coordination of care for urgency

## 2020-04-28 ENCOUNTER — NURSE TRIAGE (OUTPATIENT)
Dept: NURSING | Facility: CLINIC | Age: 60
End: 2020-04-28

## 2020-04-28 NOTE — TELEPHONE ENCOUNTER
Pt was exposed to some one with COVID, Pt works at [employer].  Pt stated that just within the last few hours she got really sick feeling.      Writer advised not to go to work, that Pt needs to call [employer] to discuss options as an employee and their recommendations.      Pt verbalized understanding    Milan Burnham, RN  Care Coordinator   [facility]

## 2020-05-01 ENCOUNTER — APPOINTMENT (OUTPATIENT)
Dept: URGENT CARE | Facility: URGENT CARE | Age: 60
End: 2020-05-01
Payer: COMMERCIAL

## 2020-05-01 ENCOUNTER — RESULTS ONLY (OUTPATIENT)
Dept: LAB | Age: 60
End: 2020-05-01

## 2020-05-01 DIAGNOSIS — F41.9 ANXIETY: ICD-10-CM

## 2020-05-01 LAB
SARS-COV-2 RNA SPEC QL NAA+PROBE: NOT DETECTED
SPECIMEN SOURCE: NORMAL

## 2020-05-04 RX ORDER — SERTRALINE HYDROCHLORIDE 100 MG/1
TABLET, FILM COATED ORAL
Qty: 135 TABLET | Refills: 1 | Status: SHIPPED | OUTPATIENT
Start: 2020-05-04 | End: 2020-10-28

## 2020-05-04 NOTE — TELEPHONE ENCOUNTER
Routing refill request to provider for review/approval because:  Drug interaction warning    Sylvia Beltran RN on 5/4/2020 at 11:22 AM

## 2020-06-23 DIAGNOSIS — E78.5 HYPERLIPIDEMIA LDL GOAL <130: ICD-10-CM

## 2020-06-23 LAB
ALT SERPL W P-5'-P-CCNC: 35 U/L (ref 0–50)
CHOLEST SERPL-MCNC: 178 MG/DL
HDLC SERPL-MCNC: 68 MG/DL
LDLC SERPL CALC-MCNC: 84 MG/DL
NONHDLC SERPL-MCNC: 110 MG/DL
TRIGL SERPL-MCNC: 131 MG/DL

## 2020-06-23 PROCEDURE — 36415 COLL VENOUS BLD VENIPUNCTURE: CPT | Performed by: PHYSICIAN ASSISTANT

## 2020-06-23 PROCEDURE — 80061 LIPID PANEL: CPT | Performed by: PHYSICIAN ASSISTANT

## 2020-06-23 PROCEDURE — 84460 ALANINE AMINO (ALT) (SGPT): CPT | Performed by: PHYSICIAN ASSISTANT

## 2020-09-29 DIAGNOSIS — M62.838 MUSCLE SPASM: ICD-10-CM

## 2020-09-29 DIAGNOSIS — J30.9 CHRONIC ALLERGIC RHINITIS: ICD-10-CM

## 2020-09-29 DIAGNOSIS — E78.5 HYPERLIPIDEMIA LDL GOAL <130: ICD-10-CM

## 2020-09-29 RX ORDER — FLUTICASONE PROPIONATE 50 MCG
SPRAY, SUSPENSION (ML) NASAL
Qty: 48 G | Refills: 0 | Status: SHIPPED | OUTPATIENT
Start: 2020-09-29 | End: 2020-10-28

## 2020-09-29 RX ORDER — ATORVASTATIN CALCIUM 40 MG/1
TABLET, FILM COATED ORAL
Qty: 90 TABLET | Refills: 0 | Status: SHIPPED | OUTPATIENT
Start: 2020-09-29 | End: 2020-10-28

## 2020-09-29 NOTE — TELEPHONE ENCOUNTER
Yearly fasting visit wasdue 9/13/20.  Please call to schedule. Route to [facility] after scheduling to address Methocarbamol refill.    [facility]ria refill Atorvastatin and Flonase.  Angie Welch RN

## 2020-09-30 RX ORDER — METHOCARBAMOL 500 MG/1
TABLET, FILM COATED ORAL
Qty: 90 TABLET | Refills: 0 | Status: SHIPPED | OUTPATIENT
Start: 2020-09-30 | End: 2020-10-28

## 2020-10-27 ASSESSMENT — ENCOUNTER SYMPTOMS
DIZZINESS: 1
NERVOUS/ANXIOUS: 0
SHORTNESS OF BREATH: 0
DIARRHEA: 0
SORE THROAT: 0
COUGH: 0
NAUSEA: 0
EYE PAIN: 0
FEVER: 0
ABDOMINAL PAIN: 0
HEADACHES: 0
CONSTIPATION: 0
ARTHRALGIAS: 1
HEMATURIA: 0
HEARTBURN: 0
BREAST MASS: 0
FREQUENCY: 0
DYSURIA: 0
JOINT SWELLING: 0
WEAKNESS: 0
MYALGIAS: 1
CHILLS: 0
PALPITATIONS: 0
PARESTHESIAS: 0
HEMATOCHEZIA: 0

## 2020-10-28 ENCOUNTER — OFFICE VISIT (OUTPATIENT)
Dept: FAMILY MEDICINE | Facility: CLINIC | Age: 60
End: 2020-10-28
Payer: COMMERCIAL

## 2020-10-28 VITALS
WEIGHT: 179 LBS | TEMPERATURE: 98.2 F | BODY MASS INDEX: 27.22 KG/M2 | SYSTOLIC BLOOD PRESSURE: 112 MMHG | OXYGEN SATURATION: 97 % | DIASTOLIC BLOOD PRESSURE: 74 MMHG | HEART RATE: 78 BPM

## 2020-10-28 DIAGNOSIS — Z96.653 STATUS POST TOTAL BILATERAL KNEE REPLACEMENT: ICD-10-CM

## 2020-10-28 DIAGNOSIS — F41.9 ANXIETY: ICD-10-CM

## 2020-10-28 DIAGNOSIS — F32.0 MAJOR DEPRESSIVE DISORDER, SINGLE EPISODE, MILD (H): ICD-10-CM

## 2020-10-28 DIAGNOSIS — Z11.4 SCREENING FOR HIV (HUMAN IMMUNODEFICIENCY VIRUS): ICD-10-CM

## 2020-10-28 DIAGNOSIS — H81.10 BENIGN PAROXYSMAL POSITIONAL VERTIGO, UNSPECIFIED LATERALITY: ICD-10-CM

## 2020-10-28 DIAGNOSIS — F51.04 PSYCHOPHYSIOLOGICAL INSOMNIA: ICD-10-CM

## 2020-10-28 DIAGNOSIS — D50.9 IRON DEFICIENCY ANEMIA, UNSPECIFIED IRON DEFICIENCY ANEMIA TYPE: ICD-10-CM

## 2020-10-28 DIAGNOSIS — E78.5 HYPERLIPIDEMIA LDL GOAL <130: ICD-10-CM

## 2020-10-28 DIAGNOSIS — Z00.00 ROUTINE GENERAL MEDICAL EXAMINATION AT A HEALTH CARE FACILITY: Primary | ICD-10-CM

## 2020-10-28 DIAGNOSIS — Z12.11 SCREEN FOR COLON CANCER: ICD-10-CM

## 2020-10-28 DIAGNOSIS — J30.9 CHRONIC ALLERGIC RHINITIS: ICD-10-CM

## 2020-10-28 DIAGNOSIS — J30.2 SEASONAL ALLERGIES: ICD-10-CM

## 2020-10-28 LAB
ERYTHROCYTE [DISTWIDTH] IN BLOOD BY AUTOMATED COUNT: 12.8 % (ref 10–15)
HBA1C MFR BLD: 6.4 % (ref 0–5.6)
HCT VFR BLD AUTO: 41.4 % (ref 35–47)
HGB BLD-MCNC: 13.9 G/DL (ref 11.7–15.7)
MCH RBC QN AUTO: 29.8 PG (ref 26.5–33)
MCHC RBC AUTO-ENTMCNC: 33.6 G/DL (ref 31.5–36.5)
MCV RBC AUTO: 89 FL (ref 78–100)
PLATELET # BLD AUTO: 270 10E9/L (ref 150–450)
RBC # BLD AUTO: 4.67 10E12/L (ref 3.8–5.2)
WBC # BLD AUTO: 6.1 10E9/L (ref 4–11)

## 2020-10-28 PROCEDURE — 83550 IRON BINDING TEST: CPT | Performed by: PHYSICIAN ASSISTANT

## 2020-10-28 PROCEDURE — 83036 HEMOGLOBIN GLYCOSYLATED A1C: CPT | Performed by: PHYSICIAN ASSISTANT

## 2020-10-28 PROCEDURE — 87389 HIV-1 AG W/HIV-1&-2 AB AG IA: CPT | Performed by: PHYSICIAN ASSISTANT

## 2020-10-28 PROCEDURE — 85027 COMPLETE CBC AUTOMATED: CPT | Performed by: PHYSICIAN ASSISTANT

## 2020-10-28 PROCEDURE — 99396 PREV VISIT EST AGE 40-64: CPT | Performed by: PHYSICIAN ASSISTANT

## 2020-10-28 PROCEDURE — 36415 COLL VENOUS BLD VENIPUNCTURE: CPT | Performed by: PHYSICIAN ASSISTANT

## 2020-10-28 PROCEDURE — 80053 COMPREHEN METABOLIC PANEL: CPT | Performed by: PHYSICIAN ASSISTANT

## 2020-10-28 PROCEDURE — 83540 ASSAY OF IRON: CPT | Performed by: PHYSICIAN ASSISTANT

## 2020-10-28 PROCEDURE — 80061 LIPID PANEL: CPT | Performed by: PHYSICIAN ASSISTANT

## 2020-10-28 PROCEDURE — 82728 ASSAY OF FERRITIN: CPT | Performed by: PHYSICIAN ASSISTANT

## 2020-10-28 RX ORDER — SERTRALINE HYDROCHLORIDE 100 MG/1
TABLET, FILM COATED ORAL
Qty: 135 TABLET | Refills: 1 | Status: SHIPPED | OUTPATIENT
Start: 2020-10-28 | End: 2021-05-14

## 2020-10-28 RX ORDER — ATORVASTATIN CALCIUM 40 MG/1
TABLET, FILM COATED ORAL
Qty: 90 TABLET | Refills: 0 | Status: SHIPPED | OUTPATIENT
Start: 2020-10-28 | End: 2021-03-30

## 2020-10-28 RX ORDER — FERROUS SULFATE 325(65) MG
325 TABLET ORAL 2 TIMES DAILY WITH MEALS
Qty: 100 TABLET | Refills: 0 | Status: SHIPPED | OUTPATIENT
Start: 2020-10-28

## 2020-10-28 RX ORDER — ALBUTEROL SULFATE 90 UG/1
2 AEROSOL, METERED RESPIRATORY (INHALATION) EVERY 4 HOURS PRN
Qty: 1 INHALER | Refills: 1 | Status: CANCELLED | OUTPATIENT
Start: 2020-10-28

## 2020-10-28 RX ORDER — ALBUTEROL SULFATE 90 UG/1
2 AEROSOL, METERED RESPIRATORY (INHALATION) EVERY 4 HOURS PRN
Qty: 1 INHALER | Refills: 1 | Status: SHIPPED | OUTPATIENT
Start: 2020-10-28

## 2020-10-28 RX ORDER — FLUTICASONE PROPIONATE 50 MCG
SPRAY, SUSPENSION (ML) NASAL
Qty: 48 G | Refills: 0 | Status: CANCELLED | OUTPATIENT
Start: 2020-10-28

## 2020-10-28 RX ORDER — METHOCARBAMOL 750 MG/1
750 TABLET, FILM COATED ORAL 4 TIMES DAILY PRN
Qty: 180 TABLET | Refills: 0 | Status: CANCELLED | OUTPATIENT
Start: 2020-10-28

## 2020-10-28 RX ORDER — FLUTICASONE PROPIONATE 50 MCG
SPRAY, SUSPENSION (ML) NASAL
Qty: 48 G | Refills: 0 | Status: SHIPPED | OUTPATIENT
Start: 2020-10-28 | End: 2021-05-20

## 2020-10-28 RX ORDER — HYDROXYZINE HYDROCHLORIDE 25 MG/1
TABLET, FILM COATED ORAL
Qty: 90 TABLET | Refills: 1 | Status: SHIPPED | OUTPATIENT
Start: 2020-10-28 | End: 2021-03-30

## 2020-10-28 RX ORDER — METHOCARBAMOL 750 MG/1
750 TABLET, FILM COATED ORAL 4 TIMES DAILY PRN
Qty: 180 TABLET | Refills: 0 | Status: SHIPPED | OUTPATIENT
Start: 2020-10-28

## 2020-10-28 RX ORDER — ATORVASTATIN CALCIUM 40 MG/1
TABLET, FILM COATED ORAL
Qty: 90 TABLET | Refills: 0 | Status: CANCELLED | OUTPATIENT
Start: 2020-10-28

## 2020-10-28 RX ORDER — HYDROXYZINE HYDROCHLORIDE 25 MG/1
TABLET, FILM COATED ORAL
Qty: 90 TABLET | Refills: 1 | Status: CANCELLED | OUTPATIENT
Start: 2020-10-28

## 2020-10-28 ASSESSMENT — ENCOUNTER SYMPTOMS
HEMATOCHEZIA: 0
CHILLS: 0
ARTHRALGIAS: 1
DYSURIA: 0
WEAKNESS: 0
CONSTIPATION: 0
BREAST MASS: 0
PALPITATIONS: 0
PARESTHESIAS: 0
COUGH: 0
NERVOUS/ANXIOUS: 0
SHORTNESS OF BREATH: 0
FREQUENCY: 0
JOINT SWELLING: 0
DIARRHEA: 0
ABDOMINAL PAIN: 0
MYALGIAS: 1
HEADACHES: 0
HEARTBURN: 0
DIZZINESS: 1
NAUSEA: 0
SORE THROAT: 0
EYE PAIN: 0
FEVER: 0
HEMATURIA: 0

## 2020-10-28 ASSESSMENT — ANXIETY QUESTIONNAIRES
4. TROUBLE RELAXING: SEVERAL DAYS
3. WORRYING TOO MUCH ABOUT DIFFERENT THINGS: SEVERAL DAYS
6. BECOMING EASILY ANNOYED OR IRRITABLE: SEVERAL DAYS
1. FEELING NERVOUS, ANXIOUS, OR ON EDGE: SEVERAL DAYS
5. BEING SO RESTLESS THAT IT IS HARD TO SIT STILL: SEVERAL DAYS
2. NOT BEING ABLE TO STOP OR CONTROL WORRYING: SEVERAL DAYS
7. FEELING AFRAID AS IF SOMETHING AWFUL MIGHT HAPPEN: SEVERAL DAYS
GAD7 TOTAL SCORE: 7
GAD7 TOTAL SCORE: 7
7. FEELING AFRAID AS IF SOMETHING AWFUL MIGHT HAPPEN: SEVERAL DAYS
GAD7 TOTAL SCORE: 7

## 2020-10-28 ASSESSMENT — PATIENT HEALTH QUESTIONNAIRE - PHQ9
10. IF YOU CHECKED OFF ANY PROBLEMS, HOW DIFFICULT HAVE THESE PROBLEMS MADE IT FOR YOU TO DO YOUR WORK, TAKE CARE OF THINGS AT HOME, OR GET ALONG WITH OTHER PEOPLE: SOMEWHAT DIFFICULT
SUM OF ALL RESPONSES TO PHQ QUESTIONS 1-9: 5
SUM OF ALL RESPONSES TO PHQ QUESTIONS 1-9: 5
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
## 2020-10-28 NOTE — LETTER
"My Asthma Action Plan    Name: Delilah Miranda   YOB: 1960  Date: 10/28/2020   My doctor: Roxana Cuadra PA-C   My clinic: [facility]        My Rescue Medicine:   Albuterol inhaler (Proair/Ventolin/Proventil HFA)  2-4 puffs EVERY 4 HOURS as needed. Use a spacer if recommended by your provider.   My Asthma Severity:   { :770740::\"Intermittent / Exercise Induced\"}  Know your asthma triggers: { :115876}             GREEN ZONE   Good Control    I feel good    No cough or wheeze    Can work, sleep and play without asthma symptoms       Take your asthma control medicine every day.     1. If exercise triggers your asthma, take your rescue medication    15 minutes before exercise or sports, and    During exercise if you have asthma symptoms  2. Spacer to use with inhaler: If you have a spacer, make sure to use it with your inhaler             YELLOW ZONE Getting Worse  I have ANY of these:    I do not feel good    Cough or wheeze    Chest feels tight    Wake up at night   1. Keep taking your Green Zone medications  2. Start taking your rescue medicine:    every 20 minutes for up to 1 hour. Then every 4 hours for 24-48 hours.  3. If you stay in the Yellow Zone for more than 12-24 hours, contact your doctor.  4. If you do not return to the Green Zone in 12-24 hours or you get worse, start taking your oral steroid medicine if prescribed by your provider.           RED ZONE Medical Alert - Get Help  I have ANY of these:    I feel awful    Medicine is not helping    Breathing getting harder    Trouble walking or talking    Nose opens wide to breathe       1. Take your rescue medicine NOW  2. If your provider has prescribed an oral steroid medicine, start taking it NOW  3. Call your doctor NOW  4. If you are still in the Red Zone after 20 minutes and you have not reached your doctor:    Take your rescue medicine again and    Call 911 or go to the emergency room right away    See your " regular doctor within 2 weeks of an Emergency Room or Urgent Care visit for follow-up treatment.          Annual Reminders:  Meet with Asthma Educator,  Flu Shot in the Fall, consider Pneumonia Vaccination for patients with asthma (aged 19 and older).    Pharmacy: [place], [place] - [phone]    Electronically signed by Roxana Cuadra PA-C   Date: 10/28/20                    Asthma Triggers  How To Control Things That Make Your Asthma Worse    Triggers are things that make your asthma worse.  Look at the list below to help you find your triggers and   what you can do about them. You can help prevent asthma flare-ups by staying away from your triggers.      Trigger                                                          What you can do   Cigarette Smoke  Tobacco smoke can make asthma worse. Do not allow smoking in your home, car or around you.  Be sure no one smokes at a child s day care or school.  If you smoke, ask your health care provider for ways to help you quit.  Ask family members to quit too.  Ask your health care provider for a referral to Quit Plan to help you quit smoking, or call 4-465-404PLAN.     Colds, Flu, Bronchitis  These are common triggers of asthma. Wash your hands often.  Don t touch your eyes, nose or mouth.  Get a flu shot every year.     Dust Mites  These are tiny bugs that live in cloth or carpet. They are too small to see. Wash sheets and blankets in hot water every week.   Encase pillows and mattress in dust mite proof covers.  Avoid having carpet if you can. If you have carpet, vacuum weekly.   Use a dust mask and HEPA vacuum.   Pollen and Outdoor Mold  Some people are allergic to trees, grass, or weed pollen, or molds. Try to keep your windows closed.  Limit time out doors when pollen count is high.   Ask you health care provider about taking medicine during allergy season.     Animal Dander  Some people are allergic to skin flakes, urine or  saliva from pets with fur or feathers. Keep pets with fur or feathers out of your home.    If you can t keep the pet outdoors, then keep the pet out of your bedroom.  Keep the bedroom door closed.  Keep pets off cloth furniture and away from stuffed toys.     Mice, Rats, and Cockroaches  Some people are allergic to the waste from these pests.   Cover food and garbage.  Clean up spills and food crumbs.  Store grease in the refrigerator.   Keep food out of the bedroom.   Indoor Mold  This can be a trigger if your home has high moisture. Fix leaking faucets, pipes, or other sources of water.   Clean moldy surfaces.  Dehumidify basement if it is damp and smelly.   Smoke, Strong Odors, and Sprays  These can reduce air quality. Stay away from strong odors and sprays, such as perfume, powder, hair spray, paints, smoke incense, paint, cleaning products, candles and new carpet.   Exercise or Sports  Some people with asthma have this trigger. Be active!  Ask your doctor about taking medicine before sports or exercise to prevent symptoms.    Warm up for 5-10 minutes before and after sports or exercise.     Other Triggers of Asthma  Cold air:  Cover your nose and mouth with a scarf.  Sometimes laughing or crying can be a trigger.  Some medicines and food can trigger asthma.

## 2020-10-29 LAB
ALBUMIN SERPL-MCNC: 4.3 G/DL (ref 3.4–5)
ALP SERPL-CCNC: 121 U/L (ref 40–150)
ALT SERPL W P-5'-P-CCNC: 28 U/L (ref 0–50)
ANION GAP SERPL CALCULATED.3IONS-SCNC: 4 MMOL/L (ref 3–14)
AST SERPL W P-5'-P-CCNC: 20 U/L (ref 0–45)
BILIRUB SERPL-MCNC: 0.9 MG/DL (ref 0.2–1.3)
BUN SERPL-MCNC: 12 MG/DL (ref 7–30)
CALCIUM SERPL-MCNC: 9.3 MG/DL (ref 8.5–10.1)
CHLORIDE SERPL-SCNC: 109 MMOL/L (ref 94–109)
CHOLEST SERPL-MCNC: 174 MG/DL
CO2 SERPL-SCNC: 27 MMOL/L (ref 20–32)
CREAT SERPL-MCNC: 0.64 MG/DL (ref 0.52–1.04)
FERRITIN SERPL-MCNC: 93 NG/ML (ref 8–252)
GFR SERPL CREATININE-BSD FRML MDRD: >90 ML/MIN/{1.73_M2}
GLUCOSE SERPL-MCNC: 102 MG/DL (ref 70–99)
HDLC SERPL-MCNC: 60 MG/DL
HIV 1+2 AB+HIV1 P24 AG SERPL QL IA: NONREACTIVE
IRON SATN MFR SERPL: 16 % (ref 15–46)
IRON SERPL-MCNC: 62 UG/DL (ref 35–180)
LDLC SERPL CALC-MCNC: 86 MG/DL
NONHDLC SERPL-MCNC: 114 MG/DL
POTASSIUM SERPL-SCNC: 4 MMOL/L (ref 3.4–5.3)
PROT SERPL-MCNC: 7.6 G/DL (ref 6.8–8.8)
SODIUM SERPL-SCNC: 140 MMOL/L (ref 133–144)
TIBC SERPL-MCNC: 394 UG/DL (ref 240–430)
TRIGL SERPL-MCNC: 141 MG/DL

## 2020-10-29 ASSESSMENT — ASTHMA QUESTIONNAIRES: ACT_TOTALSCORE: 25

## 2020-10-29 ASSESSMENT — PATIENT HEALTH QUESTIONNAIRE - PHQ9: SUM OF ALL RESPONSES TO PHQ QUESTIONS 1-9: 5

## 2020-10-29 ASSESSMENT — ANXIETY QUESTIONNAIRES: GAD7 TOTAL SCORE: 7

## 2020-10-30 ENCOUNTER — HOSPITAL ENCOUNTER (OUTPATIENT)
Dept: PHYSICAL THERAPY | Facility: CLINIC | Age: 60
End: 2020-10-30
Attending: PHYSICIAN ASSISTANT
Payer: COMMERCIAL

## 2020-10-30 DIAGNOSIS — H81.10 BENIGN PAROXYSMAL POSITIONAL VERTIGO, UNSPECIFIED LATERALITY: ICD-10-CM

## 2020-10-30 DIAGNOSIS — R42 DIZZINESS: Primary | ICD-10-CM

## 2020-10-30 PROCEDURE — 97162 PT EVAL MOD COMPLEX 30 MIN: CPT | Mod: GP | Performed by: PHYSICAL THERAPIST

## 2020-10-30 PROCEDURE — 95992 CANALITH REPOSITIONING PROC: CPT | Mod: GP | Performed by: PHYSICAL THERAPIST
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
## 2020-11-13 DIAGNOSIS — Z11.59 ENCOUNTER FOR SCREENING FOR OTHER VIRAL DISEASES: Primary | ICD-10-CM

## 2020-11-27 DIAGNOSIS — Z12.31 VISIT FOR SCREENING MAMMOGRAM: ICD-10-CM

## 2020-11-30 ENCOUNTER — ALLIED HEALTH/NURSE VISIT (OUTPATIENT)
Dept: FAMILY MEDICINE | Facility: CLINIC | Age: 60
End: 2020-11-30
Payer: COMMERCIAL

## 2020-11-30 DIAGNOSIS — Z23 NEED FOR SHINGLES VACCINE: Primary | ICD-10-CM

## 2020-11-30 PROCEDURE — 90750 HZV VACC RECOMBINANT IM: CPT

## 2020-11-30 PROCEDURE — 99207 PR NO CHARGE NURSE ONLY: CPT

## 2020-11-30 PROCEDURE — 90471 IMMUNIZATION ADMIN: CPT

## 2020-11-30 NOTE — PROGRESS NOTES
Prior to immunization administration, verified patients identity using patient s name and date of birth. Please see Immunization Activity for additional information.     Screening Questionnaire for Adult Immunization    Are you sick today?   No   Do you have allergies to medications, food, a vaccine component or latex?   No   Have you ever had a serious reaction after receiving a vaccination?   No   Do you have a long-term health problem with heart, lung, kidney, or metabolic disease (e.g., diabetes), asthma, a blood disorder, no spleen, complement component deficiency, a cochlear implant, or a spinal fluid leak?  Are you on long-term aspirin therapy?   No   Do you have cancer, leukemia, HIV/AIDS, or any other immune system problem?   No   Do you have a parent, brother, or sister with an immune system problem?   No   In the past 3 months, have you taken medications that affect  your immune system, such as prednisone, other steroids, or anticancer drugs; drugs for the treatment of rheumatoid arthritis, Crohn s disease, or psoriasis; or have you had radiation treatments?   No   Have you had a seizure, or a brain or other nervous system problem?   No   During the past year, have you received a transfusion of blood or blood    products, or been given immune (gamma) globulin or antiviral drug?   No   For women: Are you pregnant or is there a chance you could become       pregnant during the next month?   No   Have you received any vaccinations in the past 4 weeks?   No     Immunization questionnaire answers were all negative.        Per orders of Roxana Cuadra, injection of 1st Shingrix given by Danielle Villareal CMA. Patient instructed to remain in clinic for 15 minutes afterwards, and to report any adverse reaction to me immediately.       Screening performed by Danielle Villareal CMA on 11/30/2020 at 10:36 AM.

## 2021-01-07 DIAGNOSIS — Z11.59 ENCOUNTER FOR SCREENING FOR OTHER VIRAL DISEASES: ICD-10-CM

## 2021-01-07 LAB
SARS-COV-2 RNA RESP QL NAA+PROBE: NORMAL
SPECIMEN SOURCE: NORMAL

## 2021-01-08 LAB
LABORATORY COMMENT REPORT: NORMAL
SARS-COV-2 RNA RESP QL NAA+PROBE: NEGATIVE
SPECIMEN SOURCE: NORMAL

## 2021-01-11 ENCOUNTER — HOSPITAL ENCOUNTER (OUTPATIENT)
Facility: CLINIC | Age: 61
Discharge: HOME | End: 2021-01-11
Attending: INTERNAL MEDICINE | Admitting: INTERNAL MEDICINE
Payer: COMMERCIAL

## 2021-01-11 VITALS
OXYGEN SATURATION: 96 % | HEIGHT: 62 IN | TEMPERATURE: 97.8 F | WEIGHT: 172 LBS | SYSTOLIC BLOOD PRESSURE: 100 MMHG | DIASTOLIC BLOOD PRESSURE: 63 MMHG | RESPIRATION RATE: 16 BRPM | HEART RATE: 61 BPM | BODY MASS INDEX: 31.65 KG/M2

## 2021-01-11 LAB — COLONOSCOPY: NORMAL

## 2021-01-11 PROCEDURE — 88305 TISSUE EXAM BY PATHOLOGIST: CPT | Mod: TC | Performed by: INTERNAL MEDICINE

## 2021-01-11 PROCEDURE — 250N000013 HC RX MED GY IP 250 OP 250 PS 637: Performed by: INTERNAL MEDICINE

## 2021-01-11 PROCEDURE — G0500 MOD SEDAT ENDO SERVICE >5YRS: HCPCS | Performed by: INTERNAL MEDICINE

## 2021-01-11 PROCEDURE — 45385 COLONOSCOPY W/LESION REMOVAL: CPT | Performed by: INTERNAL MEDICINE

## 2021-01-11 PROCEDURE — 99153 MOD SED SAME PHYS/QHP EA: CPT | Performed by: INTERNAL MEDICINE

## 2021-01-11 PROCEDURE — 88305 TISSUE EXAM BY PATHOLOGIST: CPT | Mod: 26

## 2021-01-11 PROCEDURE — 250N000011 HC RX IP 250 OP 636: Performed by: INTERNAL MEDICINE

## 2021-01-11 RX ORDER — ONDANSETRON 2 MG/ML
4 INJECTION INTRAMUSCULAR; INTRAVENOUS EVERY 6 HOURS PRN
Status: DISCONTINUED | OUTPATIENT
Start: 2021-01-11 | End: 2021-01-11 | Stop reason: HOSPADM

## 2021-01-11 RX ORDER — SIMETHICONE 40MG/0.6ML
SUSPENSION, DROPS(FINAL DOSAGE FORM)(ML) ORAL PRN
Status: DISCONTINUED | OUTPATIENT
Start: 2021-01-11 | End: 2021-01-11 | Stop reason: HOSPADM

## 2021-01-11 RX ORDER — NALOXONE HYDROCHLORIDE 0.4 MG/ML
0.4 INJECTION, SOLUTION INTRAMUSCULAR; INTRAVENOUS; SUBCUTANEOUS
Status: DISCONTINUED | OUTPATIENT
Start: 2021-01-11 | End: 2021-01-11 | Stop reason: HOSPADM

## 2021-01-11 RX ORDER — ONDANSETRON 2 MG/ML
4 INJECTION INTRAMUSCULAR; INTRAVENOUS
Status: DISCONTINUED | OUTPATIENT
Start: 2021-01-11 | End: 2021-01-11 | Stop reason: HOSPADM

## 2021-01-11 RX ORDER — NALOXONE HYDROCHLORIDE 0.4 MG/ML
0.2 INJECTION, SOLUTION INTRAMUSCULAR; INTRAVENOUS; SUBCUTANEOUS
Status: DISCONTINUED | OUTPATIENT
Start: 2021-01-11 | End: 2021-01-11 | Stop reason: HOSPADM

## 2021-01-11 RX ORDER — PROCHLORPERAZINE MALEATE 10 MG
10 TABLET ORAL EVERY 6 HOURS PRN
Status: DISCONTINUED | OUTPATIENT
Start: 2021-01-11 | End: 2021-01-11 | Stop reason: HOSPADM

## 2021-01-11 RX ORDER — FLUMAZENIL 0.1 MG/ML
0.2 INJECTION, SOLUTION INTRAVENOUS
Status: DISCONTINUED | OUTPATIENT
Start: 2021-01-11 | End: 2021-01-11 | Stop reason: HOSPADM

## 2021-01-11 RX ORDER — LIDOCAINE 40 MG/G
CREAM TOPICAL
Status: DISCONTINUED | OUTPATIENT
Start: 2021-01-11 | End: 2021-01-11 | Stop reason: HOSPADM

## 2021-01-11 RX ORDER — ONDANSETRON 4 MG/1
4 TABLET, ORALLY DISINTEGRATING ORAL EVERY 6 HOURS PRN
Status: DISCONTINUED | OUTPATIENT
Start: 2021-01-11 | End: 2021-01-11 | Stop reason: HOSPADM

## 2021-01-11 RX ORDER — FENTANYL CITRATE 50 UG/ML
INJECTION, SOLUTION INTRAMUSCULAR; INTRAVENOUS PRN
Status: DISCONTINUED | OUTPATIENT
Start: 2021-01-11 | End: 2021-01-11 | Stop reason: HOSPADM

## 2021-01-11 ASSESSMENT — MIFFLIN-ST. JEOR: SCORE: 1293.44

## 2021-01-11 NOTE — PROCEDURES
PRE-PROCEDURE H&P    CHIEF COMPLAINT / REASON FOR PROCEDURE:  screening    PERTINENT HISTORY :    Past Medical History:   Diagnosis Date     Anemia     after previous hip replacement     Anxiety 10/31/2013     Degeneration of lumbar or lumbosacral intervertebral disc     knees and hips     Mild intermittent asthma      Other chronic pain     lt hip     Personal history of tobacco use, presenting hazards to health      PONV (postoperative nausea and vomiting)      Sleep apnea     Will bring CPAP      Past Surgical History:   Procedure Laterality Date     ARTHROPLASTY HIP Right 2018    Procedure: ARTHROPLASTY HIP;  Right total hip arthroplasty using a Biomet G7 acetabulum and Taperloc femoral stem. ;  Surgeon: Bryon Zaragoza MD;  Location: [facility] OR     ARTHROPLASTY HIP Left 2018    Procedure: ARTHROPLASTY HIP;  Left total hip arthroplasty using a Biomet G7 acetabulum and Taperloc femoral stem. ;  Surgeon: Byron Zaragoza MD;  Location: [facility] OR     ARTHROPLASTY KNEE Bilateral 2018    Procedure: Bilateral total knee arthroplasty using Arthrex iBalance knee system;  Surgeon: Bryon Zaragoza MD;  Location: [facility] OR     ARTHROSCOPY KNEE BILATERAL        SECTION       excsion of lypoma      times 3     GENITOURINARY SURGERY      bladder sling     ZZC NONSPECIFIC PROCEDURE      meniscus         Bleeding tendencies:  No    Relevant Family History:  NONE     Relevant Social History:  NONE      A relevant review of systems was performed and was negative    ALLERGIES/SENSITIVITIES:   Allergies   Allergen Reactions     Nickel      No Known Drug Allergies      Metal [Staples] Rash       CURRENT MEDICATIONS:   No current outpatient medications on file.        PRE-SEDATION ASSESSMENT:    Lung Exam:  normal  Heart Exam:  normal  Airway Exam: normal  Previous reaction to anesthesia/sedation:   No  Sedation plan based on assessment: Moderate (conscious) sedation  ASA Classification:  2 -  Mild systemic disease        IMPRESSION:  screening    PLAN:  colonoscopy     Rachel Gagnon MD  [facility]  Office: [phone]

## 2021-01-11 NOTE — LETTER
December 16, 2020      Delilah Miranda  [address] [address] [facility] [address]        Dear Delilah,     Please be aware that coverage of these services is subject to the terms and limitations of your health insurance plan.  Call member services at your health plan with any benefit or coverage questions.    Thank you for choosing [facility]. You are scheduled for the following service(s):    Date:  1/11/21             Procedure:  COLONOSCOPY  Doctor:        Dr. Gagnon   Arrival Time:  7:30  *Enter and check in at the Main Hospital Entrance*  Procedure Time:  8:00      Location:   [facility]        Endoscopy Department, First Floor         [address] [place], [place] [address]      [phone] or [phone] (Formerly [facility], later [facility]) to reschedule      MIRALAX -GATORADE  PREP  Colonoscopy is the most accurate test to detect colon polyps and colon cancer; and the only test where polyps can be removed. During this procedure, a doctor examines the lining of your large intestine and rectum through a flexible tube.   COVID-19 Testing  All patients must get tested for COVID-19. Your test needs to happen 2 to 4 days before your procedure. A clinic scheduler will call you about a week in advance to set up a testing time. After the test, please stay home and stay out of contact with other people.   Transportation  You must arrange for a ride for the day of your procedure with a responsible adult. A taxi , Uber, etc, is not an option unless you are accompanied by a responsible adult. If you fail to arrange transportation with a responsible adult, your procedure will be cancelled and rescheduled.    Purchase the  following supplies at your local pharmacy:  - 2 (two) bisacodyl tablets: each tablet contains 5 mg.  (Dulcolax  laxative NOT Dulcolax  stool softener)   - 1 (one) 8.3 oz bottle of Polyethylene Glycol (PEG) 3350 Powder   (MiraLAX , Smooth LAX , ClearLAX  or equivalent)  - 64 oz  Gatorade    Regular Gatorade, Gatorade G2 , Powerade , Powerade Zero  or Pedialyte  is acceptable. Red colored flavors are not allowed; all other colors (yellow, green, orange, purple and blue) are okay. It is also okay to buy two 2.12 oz packets of powdered Gatorade that can be mixed with water to a total volume of 64 oz of liquid.  - 1 (one) 10 oz bottle of Magnesium Citrate (Red colored flavors are not allowed)  It is also okay for you to use a 0.5 oz package of powdered magnesium citrate (17 g) mixed with 10 oz of water.      PREPARATION FOR COLONOSCOPY    7 days before:    Discontinue fiber supplements and medications containing iron. This includes Metamucil  and Fibercon ; and multivitamins with iron.    3 days before:    Begin a low-fiber diet. A low-fiber diet helps making the cleanout more effective.     Examples of a low-fiber diet include (but are not limited to): white bread, white rice, pasta, crackers, fish, chicken, eggs, ground beef, creamy peanut butter, cooked/steamed/boiled vegetables, canned fruit, bananas, melons, milk, plain yogurt cheese, salad dressing and other condiments.     The following are not allowed on a low-fiber diet: seeds, nuts, popcorn, bran, whole wheat, corn, quinoa, raw fruits and vegetables, berries and dried fruit, beans and lentils.    For additional details on low-fiber diet, please refer to the table on the last page.    2 days before:    Continue the low-fiber diet.     Drink at least 8 glasses of water throughout the day.     Stop eating solid foods at 11:45 pm.    1 day before:    In the morning: begin a clear liquid diet (liquids you can see through).     Examples of a clear liquid diet include: water, clear broth or bouillon, Gatorade, Pedialyte or Powerade, carbonated and non-carbonated soft drinks (Sprite , 7-Up , ginger ale), strained fruit juices without pulp (apple, white grape, white cranberry), Jell-O  and popsicles.     The following are not allowed on a  clear liquid diet: red liquids, alcoholic beverages, dairy products (milk, creamer, and yogurt), protein shakes, creamy broths, juice with pulp and chewing tobacco.    At noon: take 2 (two) bisacodyl tablets     At 4 (and no later than 6pm): start drinking the Miralax-Gatorade preparation (8.3 oz of Miralax mixed with 64 oz of Gatorade in a large pitcher). Drink 1(one) 8 oz glass every 15 minutes thereafter, until the mixture is gone.    COLON CLEANSING TIPS: drink adequate amounts of fluids before and after your colon cleansing to prevent dehydration. Stay near a toilet because you will have diarrhea. Even if you are sitting on the toilet, continue to drink the cleansing solution every 15 minutes. If you feel nauseous or vomit, rinse your mouth with water, take a 15 to 30-minute-break and then continue drinking the solution. You will be uncomfortable until the stool has flushed from your colon (in about 2 to 4 hours). You may feel chilled.    Day of your procedure  You may take all of your morning medications including blood pressure medications, blood thinners (if you have not been instructed to stop these by our office), methadone, anti-seizure medications with sips of water 3 hours prior to your procedure or earlier. Do not take insulin or vitamins prior to your procedure. Continue the clear liquid diet.       4 hours prior: drink 10 oz of magnesium citrate. It may be easier to drink it with a straw.    STOP consuming all liquids after that.     Do not take anything by mouth during this time.     Allow extra time to travel to your procedure as you may need to stop and use a restroom along the way.    You are ready for the procedure, if you followed all instructions and your stool is no longer formed, but clear or yellow liquid. If you are unsure whether your colon is clean, please call our office at [phone] before you leave for your appointment.    Bring the following to your procedure:  - Insurance  Card/Photo ID.   - List of current medications including over-the-counter medications and supplements.   - Your rescue inhaler if you currently use one to control asthma.    Canceling or rescheduling your appointment:   If you must cancel or reschedule your appointment, please call [phone] as soon as possible.      COLONOSCOPY PRE-PROCEDURE CHECKLIST    If you have diabetes, ask your regular doctor for diet and medication restrictions.  If you take an anticoagulant or anti-platelet medication (such as Coumadin , Lovenox , Pradaxa , Xarelto , Eliquis , etc.), please call your primary doctor for advice on holding this medication.  If you take aspirin you may continue to do so.  If you are or may be pregnant, please discuss the risks and benefits of this procedure with your doctor.        What happens during a colonoscopy?    Plan to spend up to two hours, starting at registration time, at the endoscopy center the day of your procedure. The colonoscopy takes an average of 15 to 30 minutes. Recovery time is about 30 minutes.      Before the exam:    You will change into a gown.    Your medical history and medication list will be reviewed with you, unless that has been done over the phone prior to the procedure.     A nurse will insert an intravenous (IV) line into your hand or arm.    The doctor will meet with you and will give you a consent form to sign.  During the exam:     Medicine will be given through the IV line to help you relax.     Your heart rate and oxygen levels will be monitored. If your blood pressure is low, you may be given fluids through the IV line.     The doctor will insert a flexible hollow tube, called a colonoscope, into your rectum. The scope will be advanced slowly through the large intestine (colon).    You may have a feeling of fullness or pressure.     If an abnormal tissue or a polyp is found, the doctor may remove it through the endoscope for closer examination, or biopsy. Tissue  removal is painless    After the exam:           Any tissue samples removed during the exam will be sent to a lab for evaluation. It may take 5-7 working days for you to be notified of the results.     A nurse will provide you with complete discharge instructions before you leave the endoscopy center. Be sure to ask the nurse for specific instructions if you take blood thinners such as Aspirin, Coumadin or Plavix.     The doctor will prepare a full report for you and for the physician who referred you for the procedure.     Your doctor will talk with you about the initial results of your exam.      Medication given during the exam will prohibit you from driving for the rest of the day.     Following the exam, you may resume your normal diet. Your first meal should be light, no greasy foods. Avoid alcohol until the next day.     You may resume your regular activities the day after the procedure.         LOW-FIBER DIET    Foods RECOMMENDED Foods to AVOID   Breads, Cereal, Rice and Pasta:   White bread, rolls, biscuits, croissant and nestor toast.   Waffles, Macedonian toast and pancakes.   White rice, noodles, pasta, macaroni and peeled cooked potatoes.   Plain crackers and saltines.   Cooked cereals: farina, cream of rice.   Cold cereals: Puffed Rice , Rice Krispies , Corn Flakes  and Special K    Breads, Cereal, Rice and Pasta:   Breads or rolls with nuts, seeds or fruit.   Whole wheat, pumpernickel, rye breads and cornbread.   Potatoes with skin, brown or wild rice, and kasha (buckwheat).     Vegetables:   Tender cooked and canned vegetables without seeds: carrots, asparagus tips, green or wax beans, pumpkin, spinach, lima beans. Vegetables:   Raw or steamed vegetables.   Vegetables with seeds.   Sauerkraut.   Winter squash, peas, broccoli, Brussel sprouts, cabbage, onions, cauliflower, baked beans, peas and corn.   Fruits:   Strained fruit juice.   Canned fruit, except pineapple.   Ripe bananas and melon.  Fruits:   Prunes and prune juice.   Raw fruits.   Dried fruits: figs, dates and raisins.   Milk/Dairy:   Milk: plain or flavored.   Yogurt, custard and ice cream.   Cheese and cottage cheese Milk/Dairy:     Meat and other proteins:   ground, well-cooked tender beef, lamb, ham, veal, pork, fish, poultry and organ meats.   Eggs.   Peanut butter without nuts. Meat and other proteins:   Tough, fibrous meats with gristle.   Dry beans, peas and lentils.   Peanut butter with nuts.   Tofu.   Fats, Snack, Sweets, Condiments and Beverages:   Margarine, butter, oils, mayonnaise, sour cream and salad dressing, plain gravy.   Sugar, hard candy, clear jelly, honey and syrup.   Spices, cooked herbs, bouillon, broth and soups made with allowed vegetable, ketchup and mustard.   Coffee, tea and carbonated drinks.   Plain cakes, cookies and pretzels.   Gelatin, plain puddings, custard, ice cream, sherbet and popsicles. Fats, Snack, Sweets, Condiments and Beverages:   Nuts, seeds and coconut.   Jam, marmalade and preserves.   Pickles, olives, relish and horseradish.   All desserts containing nuts, seeds, dried fruit and coconut; or made from whole grains or bran.   Candy made with nuts or seeds.   Popcorn.         DIRECTIONS TO THE ENDOSCOPY DEPARTMENT    From the north ([facility])  Take [address] South, exit on [address] [address]. Get into the left hand ole, turn left (east), go one-half mile to [address] and turn left. Go north to the second stoplight, take a right on [address] and follow it to the Main Hospital entrance.    From the south ([facility])  Take 35N to the 35E split and exit on [address] [address]. On [address] [address], turn left (west) to [address]. Turn right (north) on [address]. Go north to the second stoplight, take a right on [address] and follow it to the Main Hospital entrance.    From the east via 35E ([facility])  Take [address] south to [address] [address]  exit. Turn right on [address] Go west to [address]. Turn right (north) on [address]. Go to the second stoplight, take a right on [address] to the Main Hospital entrance.    From the east via [address] ([facility])  Take [address] to [address]. Turn left (south) on [address] to [address], turn left (east) on [address] and follow it to the Main Hospital entrance.    From the west via [address] ([place])  Take [address] east to [address]. Turn right (south) on [address] to [address], turn left (east) on [address] and follow it to the Main Hospital entrance.

## 2021-01-12 LAB — COPATH REPORT: NORMAL

## 2021-02-04 ENCOUNTER — ALLIED HEALTH/NURSE VISIT (OUTPATIENT)
Dept: FAMILY MEDICINE | Facility: CLINIC | Age: 61
End: 2021-02-04
Payer: COMMERCIAL

## 2021-02-04 DIAGNOSIS — Z23 ENCOUNTER FOR IMMUNIZATION: Primary | ICD-10-CM

## 2021-02-04 PROCEDURE — 99207 PR NO CHARGE NURSE ONLY: CPT

## 2021-02-04 PROCEDURE — 90471 IMMUNIZATION ADMIN: CPT

## 2021-02-04 PROCEDURE — 90750 HZV VACC RECOMBINANT IM: CPT

## 2021-02-05 ASSESSMENT — ASTHMA QUESTIONNAIRES: ACT_TOTALSCORE: 25

## 2021-05-14 DIAGNOSIS — F41.9 ANXIETY: ICD-10-CM

## 2021-05-14 RX ORDER — SERTRALINE HYDROCHLORIDE 100 MG/1
TABLET, FILM COATED ORAL
Qty: 135 TABLET | Refills: 1 | Status: SHIPPED | OUTPATIENT
Start: 2021-05-14

## 2021-05-14 RX ORDER — HYDROXYZINE HYDROCHLORIDE 25 MG/1
TABLET, FILM COATED ORAL
Qty: 90 TABLET | Refills: 1 | Status: SHIPPED | OUTPATIENT
Start: 2021-05-14

## 2021-05-14 NOTE — TELEPHONE ENCOUNTER
Prescription approved per [facility] Refill Protocol.  Stefanie Monteiro RN, BSN  Message handled by CLINIC NURSE.

## 2021-05-20 ENCOUNTER — MYC MEDICAL ADVICE (OUTPATIENT)
Dept: FAMILY MEDICINE | Facility: CLINIC | Age: 61
End: 2021-05-20

## 2021-05-20 ENCOUNTER — VIRTUAL VISIT (OUTPATIENT)
Dept: FAMILY MEDICINE | Facility: CLINIC | Age: 61
End: 2021-05-20
Payer: COMMERCIAL

## 2021-05-20 DIAGNOSIS — H81.13 BENIGN PAROXYSMAL POSITIONAL VERTIGO DUE TO BILATERAL VESTIBULAR DISORDER: Primary | ICD-10-CM

## 2021-05-20 DIAGNOSIS — J30.9 CHRONIC ALLERGIC RHINITIS: ICD-10-CM

## 2021-05-20 PROCEDURE — 99214 OFFICE O/P EST MOD 30 MIN: CPT | Mod: 95 | Performed by: NURSE PRACTITIONER

## 2021-05-20 RX ORDER — ONDANSETRON 4 MG/1
4 TABLET, ORALLY DISINTEGRATING ORAL EVERY 8 HOURS PRN
Qty: 20 TABLET | Refills: 0 | Status: SHIPPED | OUTPATIENT
Start: 2021-05-20

## 2021-05-20 RX ORDER — SERTRALINE HYDROCHLORIDE 100 MG/1
100 TABLET, FILM COATED ORAL DAILY
COMMUNITY
Start: 2021-05-20 | End: 2021-05-20

## 2021-05-20 RX ORDER — MECLIZINE HYDROCHLORIDE 25 MG/1
25 TABLET ORAL 3 TIMES DAILY PRN
Qty: 30 TABLET | Refills: 0 | Status: SHIPPED | OUTPATIENT
Start: 2021-05-20

## 2021-05-20 RX ORDER — SOLIFENACIN SUCCINATE 5 MG/1
5 TABLET, FILM COATED ORAL DAILY
COMMUNITY
Start: 2021-05-20 | End: 2021-05-22

## 2021-05-20 RX ORDER — ATORVASTATIN CALCIUM 10 MG/1
10 TABLET, FILM COATED ORAL DAILY
COMMUNITY
Start: 2021-05-20 | End: 2021-05-20

## 2021-05-20 RX ORDER — FLUTICASONE PROPIONATE 50 MCG
SPRAY, SUSPENSION (ML) NASAL
Qty: 48 G | Refills: 1 | Status: SHIPPED | OUTPATIENT
Start: 2021-05-20

## 2021-05-20 RX ORDER — ATORVASTATIN CALCIUM 10 MG/1
10 TABLET, FILM COATED ORAL DAILY
COMMUNITY
Start: 2021-05-20
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
## 2021-05-20 NOTE — LETTER
[facility]  [address] [address]  Phone: [phone]    May 20, 2021        Delilah Miranda  [address] [address]          To whom it may concern:    RE: Delilah Miranda    Patient was seen and treated today at our clinic. Due to her illness she will not be able to work on 5/21, 5/22, 5/23 and 5/24.  Delilah can return to work on 6/1/2021 without restrictions.      Please contact me for questions or concerns.      Sincerely,        Susan Haase, APRN CNP

## 2021-05-21 ENCOUNTER — MYC MEDICAL ADVICE (OUTPATIENT)
Dept: FAMILY MEDICINE | Facility: CLINIC | Age: 61
End: 2021-05-21

## 2021-05-21 DIAGNOSIS — H81.13 BENIGN PAROXYSMAL POSITIONAL VERTIGO DUE TO BILATERAL VESTIBULAR DISORDER: Primary | ICD-10-CM

## 2021-05-28 ENCOUNTER — MYC MEDICAL ADVICE (OUTPATIENT)
Dept: FAMILY MEDICINE | Facility: CLINIC | Age: 61
End: 2021-05-28

## 2021-05-28 NOTE — TELEPHONE ENCOUNTER
Forms are in my outbox.     Please:  1) Fax  [phone]  2) Copy at [facility] (accordian file)  3) Abstract  4) Mail copy to patient    Thanks,    Roxana Cuadra PA-C

## 2021-05-28 NOTE — TELEPHONE ENCOUNTER
Signed form faxed to number listed, copy put in accordion file, copy sent to abstracting and copy mailed to pt's home address.  Roxanne Nash, CMA

## 2021-07-20 DIAGNOSIS — F51.04 PSYCHOPHYSIOLOGICAL INSOMNIA: ICD-10-CM

## 2021-07-20 NOTE — TELEPHONE ENCOUNTER
Routing refill request to provider for review/approval because:  Drug interaction warning: Zoloft

## 2021-09-04 ENCOUNTER — HEALTH MAINTENANCE LETTER (OUTPATIENT)
Age: 61
End: 2021-09-04

## 2021-10-30 ENCOUNTER — HEALTH MAINTENANCE LETTER (OUTPATIENT)
Age: 61
End: 2021-10-30

## 2021-12-25 ENCOUNTER — HEALTH MAINTENANCE LETTER (OUTPATIENT)
Age: 61
End: 2021-12-25

## 2022-03-13 NOTE — ADDENDUM NOTE
Encounter addended by: Joy Villagomez, PT on: 3/13/2022 5:58 PM   Actions taken: Clinical Note Signed, Episode resolved

## 2022-10-22 ENCOUNTER — HEALTH MAINTENANCE LETTER (OUTPATIENT)
Age: 62
End: 2022-10-22

## 2022-12-04 ENCOUNTER — HEALTH MAINTENANCE LETTER (OUTPATIENT)
Age: 62
End: 2022-12-04

## 2023-04-01 ENCOUNTER — HEALTH MAINTENANCE LETTER (OUTPATIENT)
Age: 63
End: 2023-04-01

## 2023-08-27 ENCOUNTER — HEALTH MAINTENANCE LETTER (OUTPATIENT)
Age: 63
End: 2023-08-27

## 2024-01-14 ENCOUNTER — HEALTH MAINTENANCE LETTER (OUTPATIENT)
Age: 64
End: 2024-01-14

## 2025-06-14 ENCOUNTER — HEALTH MAINTENANCE LETTER (OUTPATIENT)
Age: 65
End: 2025-06-14

## 2025-07-14 NOTE — BRIEF OP NOTE
[facility] Brief Operative Note    Pre-operative diagnosis: right degenerative joint disease   Post-operative diagnosis same   Procedure: Procedure(s):  right total hip arthroplasty    surgeon request choice anesthesia - Wound Class: I-Clean   Surgeon(s): Surgeon(s) and Role:     * Bryon Zaragoza MD - Primary     * Darlyn Justin PA-C - Assisting   Estimated blood loss: * No values recorded between 4/9/2018  7:53 AM and 4/9/2018  9:20 AM *    Specimens: * No specimens in log *   Findings: See dictation      no

## (undated) DEVICE — SU ETHIBOND 5 V-37 4X30" MB66G

## (undated) DEVICE — PREP CHLORAPREP 26ML TINTED ORANGE  260815

## (undated) DEVICE — GLOVE PROTEXIS BLUE W/NEU-THERA 8.0  2D73EB80

## (undated) DEVICE — DRSG AQUACEL AG 3.5X9.75" HYDROFIBER 412011

## (undated) DEVICE — SUTURE VICRYL+ 0 CT-1 18" DYED VIO VCP740D

## (undated) DEVICE — SOL NACL 0.9% IRRIG 3000ML BAG 2B7477

## (undated) DEVICE — KIT ENDO TURNOVER/PROCEDURE W/CLEAN A SCOPE LINERS 103888

## (undated) DEVICE — SUCTION MANIFOLD NEPTUNE 2 SYS 4 PORT 0702-020-000

## (undated) DEVICE — GLOVE PROTEXIS POWDER FREE 7.0 ORTHOPEDIC 2D73ET70

## (undated) DEVICE — BONE CEMENT MIXEVAC III HI VAC KIT  0206-015-000

## (undated) DEVICE — SET HANDPIECE INTERPULSE W/COAXIAL FAN SPRAY TIP 0210118000

## (undated) DEVICE — CLEANSER WOUND IRRISEPT 0.05% CHG IRRISEPT-403

## (undated) DEVICE — Device

## (undated) DEVICE — LINEN ORTHO ACL PACK 5447

## (undated) DEVICE — CATH TRAY FOLEY COUDE SURESTEP 16FR W/DRN BAG LATEX A304416A

## (undated) DEVICE — ENDO SNARE EXACTO COLD 9MM LOOP 2.4MMX230CM 00711115

## (undated) DEVICE — SUTURE MONOCRYL+ 2-0 CT-1 36" UNDYED MCP945H

## (undated) DEVICE — GLOVE PROTEXIS BLUE W/NEU-THERA 7.0  2D73EB70

## (undated) DEVICE — ENDO TRAP POLYP E-TRAP 00711099

## (undated) DEVICE — DRSG GAUZE 4X4" 8044

## (undated) DEVICE — GLOVE PROTEXIS POWDER FREE SMT 8.0  2D72PT80X

## (undated) DEVICE — SU VICRYL+ 0 CT 36" DYED VCP358H

## (undated) DEVICE — LINEN HALF SHEET 5512

## (undated) DEVICE — GLOVE PROTEXIS POWDER FREE 7.5 ORTHOPEDIC 2D73ET75

## (undated) DEVICE — SU VICRYL+ 1 MO-4 18" DYED VCP702D

## (undated) DEVICE — SU ETHILON 2-0 PS 18" 585H

## (undated) DEVICE — BAG CLEAR TRASH 1.3M 39X33" P4040C

## (undated) DEVICE — SOL WATER IRRIG 1000ML BOTTLE 2F7114

## (undated) DEVICE — LINEN FULL SHEET 5511

## (undated) DEVICE — HOOD FLYTE W/PEELAWAY 408-800-100

## (undated) DEVICE — LINEN DRAPE 54X72" 5467

## (undated) DEVICE — CAST PADDING 6" STERILE 9046S

## (undated) DEVICE — PACK TOTAL HIP RIDGES LATEX PO15HIFSG

## (undated) DEVICE — SPONGE RAY-TEC 4X8" 7318

## (undated) DEVICE — BLADE SAW SAGITTAL STRK 25X90X1.27MM HD SYS 6 6125-127-090

## (undated) DEVICE — PACK TOTAL KNEE BOXED LATEX FREE PO15TKFCT

## (undated) DEVICE — DRAPE STERI TOWEL LG 1010

## (undated) DEVICE — BLADE SAW SAGITTAL STRK 19.5X90X1.27MM 2108-109-000S11

## (undated) DEVICE — TOURNIQUET CUFF 30" REPRO BLUE 60-7070-105

## (undated) DEVICE — BLADE CLIPPER SGL USE 9680

## (undated) DEVICE — DRAPE STERI U 1015

## (undated) DEVICE — CATH TRAY FOLEY SURESTEP 16FR DRAIN BAG STATOCK A899916

## (undated) DEVICE — DRAIN HEMOVAC RESERVOIR KIT 10FR 1/8" MED 00-2550-002-10

## (undated) DEVICE — IMM PILLOW ABDUCT HIP MED M60-025-M

## (undated) DEVICE — DRAPE IOBAN INCISE 23X17" 6650EZ

## (undated) DEVICE — BLADE SAW SAGITTAL STRK 25X75X0.89MM SYS 6 6125-089-075

## (undated) RX ORDER — HYDROMORPHONE HYDROCHLORIDE 1 MG/ML
INJECTION, SOLUTION INTRAMUSCULAR; INTRAVENOUS; SUBCUTANEOUS
Status: DISPENSED
Start: 2018-07-30

## (undated) RX ORDER — DEXAMETHASONE SODIUM PHOSPHATE 4 MG/ML
INJECTION, SOLUTION INTRA-ARTICULAR; INTRALESIONAL; INTRAMUSCULAR; INTRAVENOUS; SOFT TISSUE
Status: DISPENSED
Start: 2018-07-30

## (undated) RX ORDER — PROPOFOL 10 MG/ML
INJECTION, EMULSION INTRAVENOUS
Status: DISPENSED
Start: 2018-07-30

## (undated) RX ORDER — NEOSTIGMINE METHYLSULFATE 1 MG/ML
VIAL (ML) INJECTION
Status: DISPENSED
Start: 2018-11-13

## (undated) RX ORDER — PROPOFOL 10 MG/ML
INJECTION, EMULSION INTRAVENOUS
Status: DISPENSED
Start: 2018-04-09

## (undated) RX ORDER — ONDANSETRON 2 MG/ML
INJECTION INTRAMUSCULAR; INTRAVENOUS
Status: DISPENSED
Start: 2018-04-09

## (undated) RX ORDER — FENTANYL CITRATE 50 UG/ML
INJECTION, SOLUTION INTRAMUSCULAR; INTRAVENOUS
Status: DISPENSED
Start: 2018-07-30

## (undated) RX ORDER — FENTANYL CITRATE 50 UG/ML
INJECTION, SOLUTION INTRAMUSCULAR; INTRAVENOUS
Status: DISPENSED
Start: 2018-11-13

## (undated) RX ORDER — FENTANYL CITRATE 50 UG/ML
INJECTION, SOLUTION INTRAMUSCULAR; INTRAVENOUS
Status: DISPENSED
Start: 2018-04-09

## (undated) RX ORDER — GLYCOPYRROLATE 0.2 MG/ML
INJECTION INTRAMUSCULAR; INTRAVENOUS
Status: DISPENSED
Start: 2018-07-30

## (undated) RX ORDER — ONDANSETRON 2 MG/ML
INJECTION INTRAMUSCULAR; INTRAVENOUS
Status: DISPENSED
Start: 2018-11-13

## (undated) RX ORDER — CEFAZOLIN SODIUM 1 G/3ML
INJECTION, POWDER, FOR SOLUTION INTRAMUSCULAR; INTRAVENOUS
Status: DISPENSED
Start: 2018-11-13

## (undated) RX ORDER — CELECOXIB 200 MG/1
CAPSULE ORAL
Status: DISPENSED
Start: 2018-07-30

## (undated) RX ORDER — DEXAMETHASONE SODIUM PHOSPHATE 4 MG/ML
INJECTION, SOLUTION INTRA-ARTICULAR; INTRALESIONAL; INTRAMUSCULAR; INTRAVENOUS; SOFT TISSUE
Status: DISPENSED
Start: 2018-11-13

## (undated) RX ORDER — GLYCOPYRROLATE 0.2 MG/ML
INJECTION INTRAMUSCULAR; INTRAVENOUS
Status: DISPENSED
Start: 2018-11-13

## (undated) RX ORDER — CEFAZOLIN SODIUM 2 G/100ML
INJECTION, SOLUTION INTRAVENOUS
Status: DISPENSED
Start: 2018-11-13

## (undated) RX ORDER — PROPOFOL 10 MG/ML
INJECTION, EMULSION INTRAVENOUS
Status: DISPENSED
Start: 2018-11-13

## (undated) RX ORDER — PHENYLEPHRINE HCL IN 0.9% NACL 1 MG/10 ML
SYRINGE (ML) INTRAVENOUS
Status: DISPENSED
Start: 2018-07-30

## (undated) RX ORDER — LABETALOL HYDROCHLORIDE 5 MG/ML
INJECTION, SOLUTION INTRAVENOUS
Status: DISPENSED
Start: 2018-07-30

## (undated) RX ORDER — PANTOPRAZOLE SODIUM 40 MG/1
TABLET, DELAYED RELEASE ORAL
Status: DISPENSED
Start: 2018-07-30

## (undated) RX ORDER — LIDOCAINE HYDROCHLORIDE 10 MG/ML
INJECTION, SOLUTION EPIDURAL; INFILTRATION; INTRACAUDAL; PERINEURAL
Status: DISPENSED
Start: 2018-04-09

## (undated) RX ORDER — CELECOXIB 200 MG/1
CAPSULE ORAL
Status: DISPENSED
Start: 2018-11-13

## (undated) RX ORDER — PANTOPRAZOLE SODIUM 40 MG/1
TABLET, DELAYED RELEASE ORAL
Status: DISPENSED
Start: 2018-11-13

## (undated) RX ORDER — DEXAMETHASONE SODIUM PHOSPHATE 4 MG/ML
INJECTION, SOLUTION INTRA-ARTICULAR; INTRALESIONAL; INTRAMUSCULAR; INTRAVENOUS; SOFT TISSUE
Status: DISPENSED
Start: 2018-04-09

## (undated) RX ORDER — SIMETHICONE 40MG/0.6ML
SUSPENSION, DROPS(FINAL DOSAGE FORM)(ML) ORAL
Status: DISPENSED
Start: 2021-01-11

## (undated) RX ORDER — FENTANYL CITRATE 50 UG/ML
INJECTION, SOLUTION INTRAMUSCULAR; INTRAVENOUS
Status: DISPENSED
Start: 2021-01-11

## (undated) RX ORDER — LIDOCAINE HYDROCHLORIDE 10 MG/ML
INJECTION, SOLUTION EPIDURAL; INFILTRATION; INTRACAUDAL; PERINEURAL
Status: DISPENSED
Start: 2018-07-30

## (undated) RX ORDER — CEFAZOLIN SODIUM 2 G/100ML
INJECTION, SOLUTION INTRAVENOUS
Status: DISPENSED
Start: 2018-07-30

## (undated) RX ORDER — CEFAZOLIN SODIUM 2 G/100ML
INJECTION, SOLUTION INTRAVENOUS
Status: DISPENSED
Start: 2018-04-09

## (undated) RX ORDER — ONDANSETRON 2 MG/ML
INJECTION INTRAMUSCULAR; INTRAVENOUS
Status: DISPENSED
Start: 2018-07-30

## (undated) RX ORDER — KETOROLAC TROMETHAMINE 30 MG/ML
INJECTION, SOLUTION INTRAMUSCULAR; INTRAVENOUS
Status: DISPENSED
Start: 2018-04-09

## (undated) RX ORDER — HYDROMORPHONE HYDROCHLORIDE 1 MG/ML
INJECTION, SOLUTION INTRAMUSCULAR; INTRAVENOUS; SUBCUTANEOUS
Status: DISPENSED
Start: 2018-04-09

## (undated) RX ORDER — NEOSTIGMINE METHYLSULFATE 1 MG/ML
VIAL (ML) INJECTION
Status: DISPENSED
Start: 2018-07-30

## (undated) RX ORDER — GLYCOPYRROLATE 0.2 MG/ML
INJECTION INTRAMUSCULAR; INTRAVENOUS
Status: DISPENSED
Start: 2018-04-09

## (undated) RX ORDER — CELECOXIB 200 MG/1
CAPSULE ORAL
Status: DISPENSED
Start: 2018-04-09

## (undated) RX ORDER — LIDOCAINE HYDROCHLORIDE 10 MG/ML
INJECTION, SOLUTION EPIDURAL; INFILTRATION; INTRACAUDAL; PERINEURAL
Status: DISPENSED
Start: 2018-11-13